# Patient Record
Sex: FEMALE | Race: WHITE | NOT HISPANIC OR LATINO | Employment: UNEMPLOYED | URBAN - NONMETROPOLITAN AREA
[De-identification: names, ages, dates, MRNs, and addresses within clinical notes are randomized per-mention and may not be internally consistent; named-entity substitution may affect disease eponyms.]

---

## 2018-05-17 ENCOUNTER — OFFICE VISIT (OUTPATIENT)
Dept: BARIATRICS/WEIGHT MGMT | Facility: CLINIC | Age: 63
End: 2018-05-17

## 2018-05-17 ENCOUNTER — OFFICE VISIT (OUTPATIENT)
Dept: BARIATRICS/WEIGHT MGMT | Facility: HOSPITAL | Age: 63
End: 2018-05-17

## 2018-05-17 ENCOUNTER — LAB (OUTPATIENT)
Dept: LAB | Facility: HOSPITAL | Age: 63
End: 2018-05-17
Attending: NURSE PRACTITIONER

## 2018-05-17 ENCOUNTER — TELEPHONE (OUTPATIENT)
Dept: BARIATRICS/WEIGHT MGMT | Facility: CLINIC | Age: 63
End: 2018-05-17

## 2018-05-17 VITALS
HEIGHT: 63 IN | DIASTOLIC BLOOD PRESSURE: 71 MMHG | SYSTOLIC BLOOD PRESSURE: 166 MMHG | OXYGEN SATURATION: 99 % | HEART RATE: 66 BPM | TEMPERATURE: 97.5 F | WEIGHT: 293 LBS | BODY MASS INDEX: 51.91 KG/M2

## 2018-05-17 DIAGNOSIS — E11.69 DIABETES MELLITUS TYPE 2 IN OBESE (HCC): ICD-10-CM

## 2018-05-17 DIAGNOSIS — I50.9 CONGESTIVE HEART FAILURE, UNSPECIFIED CONGESTIVE HEART FAILURE CHRONICITY, UNSPECIFIED CONGESTIVE HEART FAILURE TYPE: ICD-10-CM

## 2018-05-17 DIAGNOSIS — K21.9 GASTROESOPHAGEAL REFLUX DISEASE, ESOPHAGITIS PRESENCE NOT SPECIFIED: ICD-10-CM

## 2018-05-17 DIAGNOSIS — R53.83 FATIGUE, UNSPECIFIED TYPE: ICD-10-CM

## 2018-05-17 DIAGNOSIS — E78.5 HYPERLIPIDEMIA, UNSPECIFIED HYPERLIPIDEMIA TYPE: ICD-10-CM

## 2018-05-17 DIAGNOSIS — R29.818 SUSPECTED SLEEP APNEA: ICD-10-CM

## 2018-05-17 DIAGNOSIS — K76.0 FATTY LIVER: ICD-10-CM

## 2018-05-17 DIAGNOSIS — E66.9 DIABETES MELLITUS TYPE 2 IN OBESE (HCC): ICD-10-CM

## 2018-05-17 DIAGNOSIS — G47.19 EXCESSIVE DAYTIME SLEEPINESS: ICD-10-CM

## 2018-05-17 DIAGNOSIS — I10 ESSENTIAL HYPERTENSION: ICD-10-CM

## 2018-05-17 DIAGNOSIS — E66.01 MORBID OBESITY WITH BMI OF 50.0-59.9, ADULT (HCC): Primary | ICD-10-CM

## 2018-05-17 DIAGNOSIS — E66.01 MORBID OBESITY WITH BMI OF 50.0-59.9, ADULT (HCC): ICD-10-CM

## 2018-05-17 LAB
HBA1C MFR BLD: 8.2 %
TSH SERPL DL<=0.05 MIU/L-ACNC: 3.23 MIU/ML (ref 0.47–4.68)

## 2018-05-17 PROCEDURE — 84443 ASSAY THYROID STIM HORMONE: CPT | Performed by: NURSE PRACTITIONER

## 2018-05-17 PROCEDURE — 83036 HEMOGLOBIN GLYCOSYLATED A1C: CPT | Performed by: NURSE PRACTITIONER

## 2018-05-17 PROCEDURE — 97802 MEDICAL NUTRITION INDIV IN: CPT

## 2018-05-17 PROCEDURE — 36415 COLL VENOUS BLD VENIPUNCTURE: CPT

## 2018-05-17 PROCEDURE — 99204 OFFICE O/P NEW MOD 45 MIN: CPT | Performed by: NURSE PRACTITIONER

## 2018-05-17 RX ORDER — FUROSEMIDE 20 MG/1
20 TABLET ORAL DAILY
COMMUNITY

## 2018-05-17 RX ORDER — FLUOXETINE HYDROCHLORIDE 20 MG/1
40 CAPSULE ORAL DAILY
COMMUNITY
Start: 2018-03-01

## 2018-05-17 RX ORDER — MONTELUKAST SODIUM 4 MG/1
1 TABLET, CHEWABLE ORAL 2 TIMES DAILY
COMMUNITY
Start: 2018-05-12 | End: 2021-03-25

## 2018-05-17 RX ORDER — AMLODIPINE BESYLATE 10 MG/1
10 TABLET ORAL DAILY
COMMUNITY
Start: 2018-04-30

## 2018-05-17 RX ORDER — GENTAMICIN SULFATE 3 MG/ML
SOLUTION/ DROPS OPHTHALMIC
COMMUNITY
Start: 2018-02-26 | End: 2018-05-17

## 2018-05-17 RX ORDER — AZITHROMYCIN 250 MG/1
TABLET, FILM COATED ORAL
COMMUNITY
Start: 2018-02-26 | End: 2018-05-17

## 2018-05-17 RX ORDER — ATORVASTATIN CALCIUM 40 MG/1
40 TABLET, FILM COATED ORAL DAILY
COMMUNITY
Start: 2018-04-30

## 2018-05-17 RX ORDER — PANTOPRAZOLE SODIUM 40 MG/1
40 TABLET, DELAYED RELEASE ORAL DAILY
COMMUNITY
Start: 2018-04-30

## 2018-05-17 RX ORDER — RIVAROXABAN 20 MG/1
20 TABLET, FILM COATED ORAL
COMMUNITY
Start: 2018-05-07

## 2018-05-17 RX ORDER — METHYLPREDNISOLONE 4 MG/1
TABLET ORAL
COMMUNITY
Start: 2018-02-12 | End: 2018-05-17

## 2018-05-17 RX ORDER — CEFDINIR 300 MG/1
CAPSULE ORAL
COMMUNITY
Start: 2018-02-12 | End: 2018-07-25

## 2018-05-17 RX ORDER — CARVEDILOL 25 MG/1
25 TABLET ORAL 2 TIMES DAILY WITH MEALS
COMMUNITY
Start: 2018-05-15

## 2018-05-17 RX ORDER — GABAPENTIN 300 MG/1
900 CAPSULE ORAL 2 TIMES DAILY
COMMUNITY
Start: 2018-03-15

## 2018-05-17 NOTE — PROGRESS NOTES
"Subjective   Mariia Parker is a 63 y.o. female.     History of Present Illness   Mariia Parker is a 63 y.o. year-old who has morbid obesity and is interested in having medically supervised weight loss. Patient has been overweight for the past 40 years. The most weight ever lost was 50 pounds from walking and eating better. Unsupervised diet attempts include: calorie counting. Supervised diet attempts include:none.  Patient has tried the following OTC or prescription medications for weight loss: none. Patient states she tends to eat due to boredom. Patient is limited in activities due to: chronic pain \"all over\".   Vitals:    05/17/18 0903   BP: 166/71   BP Location: Right arm   Patient Position: Sitting   Cuff Size: Adult   Pulse: 66   Temp: 97.5 °F (36.4 °C)   SpO2: 99%   Weight: (!) 140 kg (308 lb 9.6 oz)   Height: 158.8 cm (62.5\")     She  has a past medical history of Anemia; Anxiety; Carpal tunnel syndrome; Congestive heart failure; Epistaxis; Fatty liver; Heart murmur; Heartburn; High cholesterol; Hypertension; Kidney disease; Migraines; Pain; Pulmonary embolus; Sciatica; Seasonal allergies (.); and Urinary incontinence.  She  does not have a problem list on file.  She  has a past surgical history that includes Cholecystectomy; Colonoscopy; Esophagogastroduodenoscopy; Hernia repair; Carpal tunnel release (Right); Hysterectomy; Umbilical hernia repair; and Foreign Body Removal (Right).  Her family history includes Breast cancer in her mother; Cancer in her mother; Heart disease in her mother; Hypertension in her brother and mother; No Known Problems in her father.  She  reports that she has never smoked. She has never used smokeless tobacco. She reports that she does not drink alcohol or use drugs.  Current Outpatient Prescriptions   Medication Sig Dispense Refill   • amLODIPine (NORVASC) 10 MG tablet      • atorvastatin (LIPITOR) 40 MG tablet      • carvedilol (COREG) 25 MG tablet      • colestipol " "(COLESTID) 1 g tablet      • FLUoxetine (PROzac) 20 MG capsule      • furosemide (LASIX) 20 MG tablet Take 20 mg by mouth 2 (Two) Times a Day.     • gabapentin (NEURONTIN) 300 MG capsule      • Multiple Vitamins-Minerals (MULTIVITAMIN ADULT PO) Take  by mouth.     • pantoprazole (PROTONIX) 40 MG EC tablet      • VITAMIN D, ERGOCALCIFEROL, PO Take  by mouth.     • Wheat Dextrin (BENEFIBER PO) Take  by mouth.     • XARELTO 20 MG tablet      • cefdinir (OMNICEF) 300 MG capsule        No current facility-administered medications for this visit.      She has No Known Allergies.      The following portions of the patient's history were reviewed and updated as appropriate: allergies, current medications, past family history, past medical history, past social history, past surgical history and problem list.    Review of Systems   Constitutional: Positive for fatigue and unexpected weight change. Negative for activity change and appetite change.   HENT: Positive for hearing loss and postnasal drip.    Eyes: Negative.         Wears glasses    Respiratory: Positive for shortness of breath. Negative for apnea, cough and chest tightness.         Snoring; scored \"18\" on [place]    Cardiovascular: Negative.  Negative for chest pain, palpitations and leg swelling.        CHF, HTN, heart murmur   Gastrointestinal: Negative.  Negative for abdominal pain, anal bleeding, constipation, nausea and vomiting.        IBS and umbilical hernia   Endocrine: Negative.         Hair loss   Genitourinary: Negative.         Kidney stones and kidney disease; sees nephrologist; stress incontinence   Musculoskeletal: Positive for arthralgias, back pain and myalgias.   Skin: Positive for rash.        Under skin folds   Allergic/Immunologic: Negative.    Neurological: Positive for weakness, numbness and headaches. Negative for seizures and syncope.   Hematological: Bruises/bleeds easily.        Hx of PE and on xarelto   Psychiatric/Behavioral: Positive " for sleep disturbance. Negative for dysphoric mood, self-injury and suicidal ideas. The patient is nervous/anxious.        Objective   Physical Exam   Constitutional: She is oriented to person, place, and time. Vital signs are normal. She appears well-developed and well-nourished. She is cooperative. No distress.   HENT:   Head: Normocephalic and atraumatic.   Nose: Nose normal.   Mouth/Throat: Oropharynx is clear and moist. No oropharyngeal exudate or tonsillar abscesses.   Eyes: Conjunctivae, EOM and lids are normal. Pupils are equal, round, and reactive to light. Right eye exhibits no discharge. Left eye exhibits no discharge.   Glasses noted   Neck: Trachea normal. Neck supple. No JVD present. Carotid bruit is not present. No neck rigidity. No tracheal deviation present. No thyromegaly present.   Cardiovascular: Normal rate, regular rhythm, S1 normal and S2 normal.    Murmur heard.   Systolic murmur is present with a grade of 2/6   Pulmonary/Chest: Effort normal and breath sounds normal. No stridor. No respiratory distress. She has no wheezes. She has no rales.   Abdominal: Soft. Bowel sounds are normal. She exhibits no distension. There is tenderness. A hernia is present.   Obese; large ventral hernia noted (about the size of a large cantaloupe)    Musculoskeletal: She exhibits edema.        Right shoulder: She exhibits normal strength.   2+ edema to lower legs   Lymphadenopathy:     She has no cervical adenopathy.   Neurological: She is alert and oriented to person, place, and time. She has normal strength. No cranial nerve deficit.   Skin: Skin is warm, dry and intact. No rash noted.   Psychiatric: She has a normal mood and affect. Her speech is normal and behavior is normal.   Alert and oriented x 3   Vitals reviewed.      Assessment/Plan   Mariia was seen today for obesity, nutrition counseling and weight loss.    Diagnoses and all orders for this visit:    Morbid obesity with BMI of 50.0-59.9, adult  -      Bariatric Nutritional Counseling; Standing  -     Polysomnography 4 or More Parameters  -     TSH; Future  -     Hemoglobin A1c; Future    Diabetes mellitus type 2 in obese  -     Bariatric Nutritional Counseling; Standing  -     Polysomnography 4 or More Parameters  -     TSH; Future  -     Hemoglobin A1c; Future    Essential hypertension  -     Bariatric Nutritional Counseling; Standing  -     Polysomnography 4 or More Parameters  -     TSH; Future  -     Hemoglobin A1c; Future    Hyperlipidemia, unspecified hyperlipidemia type  -     Bariatric Nutritional Counseling; Standing  -     TSH; Future  -     Hemoglobin A1c; Future    Fatty liver  -     Hemoglobin A1c; Future    Gastroesophageal reflux disease, esophagitis presence not specified    Congestive heart failure, unspecified congestive heart failure chronicity, unspecified congestive heart failure type    Suspected sleep apnea  -     Polysomnography 4 or More Parameters    Excessive daytime sleepiness  -     Polysomnography 4 or More Parameters    Fatigue, unspecified type  -     Polysomnography 4 or More Parameters  -     TSH; Future          Mariia Parker is a 63 y.o. who has a history of morbid obesity and desires medically supervised weight loss. Patient's personal weight loss goal is to lose at least 100 pounds total.   Baseline labs will be ordered today. Patient has been ordered a sleep study due to suspected sleep apnea. Office will arrange. Patient will see the dietician today for make specific goals for diet, exercise, and lifestyle. Patient has received intensive behavioral therapy for obesity today. I will have them follow up in one month for a weight recheck and to further discuss options.

## 2018-05-17 NOTE — PROGRESS NOTES
"Nutrition Bariatric/[facility] Note     Visit   Initial Assessment     Anthropometrics   Height: 62.5\"  Weight: 308.5#  BMI: 55.5    Waist: 57\"  Hip: 71\"  Chest: 56\"  Thigh: 28\"  Arm: 22\"  Body Fat:     Nutrition Recall  24 Hour recall:  (B) oatmeal or dry cereal or sausage mcgriddle or western omelet, water (L) skips meal or sandwich or salad or fast food, water (D) meal or sandwich or chili hotdog, water  Eating 2-3 meals daily   Protein lacking at some breakfast  Meeting protein daily goal  Snacking: chips, candy bar  Making healthier choices  Excessive sweet intake  Large portions  Drinking with meals   Drinking less than 64 fluid ounces    Exercise   None    Habits:  None    Education    Goal Setting and Information Packet    Nutrition Goals   Eat 3-4 meals per day with protein  Eat protein first at meals  Eliminate snacks  Healthier food choices  Portion control / Use smaller plate or measuring cup   Increase fluid intake to 64 ounces per day    Exercise Goals  Add 15-30 minutes of walking, cycling, elliptical, swimming, chair, yoga or crossfit daily    Rosa Maria Gentile RD, LD  05/17/2018  9:45 AM    "

## 2018-05-17 NOTE — PATIENT INSTRUCTIONS
Mariia Parker is a 63 y.o. who has a history of morbid obesity and desires medically supervised weight loss. Patient's personal weight loss goal is to lose at least 100 pounds total.   Baseline labs will be ordered today. Patient has been ordered a sleep study due to suspected sleep apnea. Office will arrange. Patient will see the dietician today for make specific goals for diet, exercise, and lifestyle. Patient has received intensive behavioral therapy for obesity today. I will have them follow up in one month for a weight recheck and to further discuss options.

## 2018-05-17 NOTE — TELEPHONE ENCOUNTER
TSH is normal.  Hemoglobin A1c 8.2%.  Target is less than 7%.  Patient is not currently on any diabetic medication (metformin stopped due to renal fx).  Patient advised to speak with her PCP in regards to medication and the possibility of considering Victoza.  We will have nurse fax over a copy of the lab work to Dr. Gaston Resendiz for his records.  Patient voiced understanding and is agreeable.

## 2018-06-08 ENCOUNTER — RESULTS ENCOUNTER (OUTPATIENT)
Dept: BARIATRICS/WEIGHT MGMT | Facility: CLINIC | Age: 63
End: 2018-06-08

## 2018-06-08 DIAGNOSIS — E78.5 HYPERLIPIDEMIA, UNSPECIFIED HYPERLIPIDEMIA TYPE: ICD-10-CM

## 2018-06-08 DIAGNOSIS — E11.69 DIABETES MELLITUS TYPE 2 IN OBESE (HCC): ICD-10-CM

## 2018-06-08 DIAGNOSIS — E66.01 MORBID OBESITY WITH BMI OF 50.0-59.9, ADULT (HCC): ICD-10-CM

## 2018-06-08 DIAGNOSIS — E66.9 DIABETES MELLITUS TYPE 2 IN OBESE (HCC): ICD-10-CM

## 2018-06-08 DIAGNOSIS — I10 ESSENTIAL HYPERTENSION: ICD-10-CM

## 2018-06-20 ENCOUNTER — OFFICE VISIT (OUTPATIENT)
Dept: BARIATRICS/WEIGHT MGMT | Facility: CLINIC | Age: 63
End: 2018-06-20

## 2018-06-20 VITALS
WEIGHT: 293 LBS | DIASTOLIC BLOOD PRESSURE: 59 MMHG | BODY MASS INDEX: 51.91 KG/M2 | TEMPERATURE: 98 F | SYSTOLIC BLOOD PRESSURE: 147 MMHG | HEIGHT: 63 IN | OXYGEN SATURATION: 97 % | HEART RATE: 70 BPM

## 2018-06-20 DIAGNOSIS — E66.9 DIABETES MELLITUS TYPE 2 IN OBESE (HCC): ICD-10-CM

## 2018-06-20 DIAGNOSIS — E11.69 DIABETES MELLITUS TYPE 2 IN OBESE (HCC): ICD-10-CM

## 2018-06-20 DIAGNOSIS — I10 ESSENTIAL HYPERTENSION: ICD-10-CM

## 2018-06-20 DIAGNOSIS — R29.818 SUSPECTED SLEEP APNEA: Primary | ICD-10-CM

## 2018-06-20 DIAGNOSIS — E66.01 OBESITY, MORBID, BMI 50 OR HIGHER (HCC): Primary | ICD-10-CM

## 2018-06-20 PROCEDURE — 99213 OFFICE O/P EST LOW 20 MIN: CPT | Performed by: NURSE PRACTITIONER

## 2018-06-20 NOTE — PATIENT INSTRUCTIONS
Mariia Parker has done well this month with healthy changes. Patient has lost 4 pounds. Today we discussed healthy changes in lifestyle, diet, and exercise.   Handout provided on portion sizes/control/reading nutrition labels.   Intensive behavioral therapy for obesity was done today.   Goals for this month are: 3 meals per day with protein at each; eat protein first, use smaller plate; exercise as advised (chair dancing, chair yoga, launch pad videos, swimming/water exercises). Check into silver sneaker program to help pay for gym membership.  Follow up in one month for a weight recheck.

## 2018-06-20 NOTE — PROGRESS NOTES
"Subjective   Mariia Parker is a 63 y.o. female.     History of Present Illness   Mariia Parker is here with morbid obesity and is being followed for medically supervised weight loss. This is the patient's 2nd visit to the office.  Patient is being followed by her PCP for diabetes and recently had to stop metformin and change to tradjenta due to decreased renal function. Patient has not been able to exercise this past month. Patient has been making health dietary choices and eating 3 meals per day with protein at each. She has been eating chicken and turkey. Patient is drinking 48 ounces of water per day. She has sleep study scheduled for 25th of this month.   Vitals:    06/20/18 0836   BP: 147/59   BP Location: Left arm   Patient Position: Sitting   Cuff Size: Adult   Pulse: 70   Temp: 98 °F (36.7 °C)   SpO2: 97%   Weight: (!) 138 kg (304 lb 3.2 oz)   Height: 158.8 cm (62.5\")         The following portions of the patient's history were reviewed and updated as appropriate: allergies, current medications, past family history, past medical history, past social history, past surgical history and problem list.    Review of Systems   Constitutional: Positive for fatigue and unexpected weight change. Negative for activity change and appetite change.   HENT: Positive for trouble swallowing.    Eyes: Positive for visual disturbance.   Respiratory: Positive for shortness of breath. Negative for apnea, cough and chest tightness.    Cardiovascular: Positive for leg swelling. Negative for chest pain and palpitations.   Gastrointestinal: Positive for constipation. Negative for abdominal pain, anal bleeding, nausea and vomiting.   Endocrine: Positive for polyuria.        Hair loss; DM   Genitourinary: Positive for frequency.   Musculoskeletal: Positive for arthralgias, back pain, myalgias and neck pain.   Skin: Negative.    Allergic/Immunologic: Negative.    Neurological: Positive for numbness and headaches. Negative for " seizures and syncope.   Hematological: Bruises/bleeds easily.        Anemia   Psychiatric/Behavioral: Positive for sleep disturbance. Negative for dysphoric mood and self-injury. The patient is nervous/anxious.        Objective   Physical Exam   Constitutional: She is oriented to person, place, and time. Vital signs are normal. She appears well-developed and well-nourished. She is cooperative. No distress.   HENT:   Head: Normocephalic and atraumatic.   Nose: Nose normal.   Mouth/Throat: Oropharynx is clear and moist. No oropharyngeal exudate or tonsillar abscesses.   Eyes: Conjunctivae, EOM and lids are normal. Pupils are equal, round, and reactive to light. Right eye exhibits no discharge. Left eye exhibits no discharge.   Neck: Trachea normal. Neck supple. No JVD present. Carotid bruit is not present. No neck rigidity. No tracheal deviation present. No thyromegaly present.   Cardiovascular: Normal rate, regular rhythm, S1 normal and S2 normal.    Murmur heard.   Systolic murmur is present with a grade of 2/6   Pulmonary/Chest: Effort normal and breath sounds normal. No stridor. No respiratory distress. She has no wheezes. She has no rales.   Abdominal: Soft. Bowel sounds are normal. She exhibits no distension. There is no tenderness.   Obese; large ventral hernia noted (about the size of a large cantaloupe)     Musculoskeletal: She exhibits edema.        Right shoulder: She exhibits normal strength.   Trace edema to lower legs   Lymphadenopathy:     She has no cervical adenopathy.   Neurological: She is alert and oriented to person, place, and time. She has normal strength. No cranial nerve deficit.   Skin: Skin is warm, dry and intact. No rash noted.   Psychiatric: She has a normal mood and affect. Her speech is normal and behavior is normal.   Alert and oriented x 3   Vitals reviewed.      Assessment/Plan   Mariia was seen today for follow-up, obesity, nutrition counseling and weight loss.    Diagnoses and all  orders for this visit:    Obesity, morbid, BMI 50 or higher    Diabetes mellitus type 2 in obese  Comments:  keep FU appts with PCP and keep taking tradjenta per him.   Weight loss and exercise will help this as well.     Essential hypertension  Comments:  blood pressure much improved today; weight loss will continue to help this as well        criselda Parker has done well this month with healthy changes. Patient has lost 4 pounds. Today we discussed healthy changes in lifestyle, diet, and exercise.   Handout provided on portion sizes/control/reading nutrition labels.   Intensive behavioral therapy for obesity was done today.   Goals for this month are: 3 meals per day with protein at each; eat protein first, use smaller plate; exercise as advised (chair dancing, chair yoga, launch pad videos, swimming/water exercises). Check into silver sneaker program to help pay for gym membership.  Follow up in one month for a weight recheck.

## 2018-06-25 ENCOUNTER — HOSPITAL ENCOUNTER (OUTPATIENT)
Dept: SLEEP MEDICINE | Facility: HOSPITAL | Age: 63
End: 2018-06-25
Attending: NURSE PRACTITIONER

## 2018-07-05 ENCOUNTER — HOSPITAL ENCOUNTER (OUTPATIENT)
Dept: SLEEP MEDICINE | Facility: HOSPITAL | Age: 63
Discharge: HOME | End: 2018-07-05
Admitting: NURSE PRACTITIONER

## 2018-07-05 DIAGNOSIS — R29.818 SUSPECTED SLEEP APNEA: ICD-10-CM

## 2018-07-05 PROCEDURE — 95806 SLEEP STUDY UNATT&RESP EFFT: CPT

## 2018-07-05 PROCEDURE — 95806 SLEEP STUDY UNATT&RESP EFFT: CPT | Performed by: PSYCHIATRY & NEUROLOGY

## 2018-07-06 ENCOUNTER — RESULTS ENCOUNTER (OUTPATIENT)
Dept: BARIATRICS/WEIGHT MGMT | Facility: CLINIC | Age: 63
End: 2018-07-06

## 2018-07-06 DIAGNOSIS — E66.9 DIABETES MELLITUS TYPE 2 IN OBESE (HCC): ICD-10-CM

## 2018-07-06 DIAGNOSIS — E11.69 DIABETES MELLITUS TYPE 2 IN OBESE (HCC): ICD-10-CM

## 2018-07-06 DIAGNOSIS — I10 ESSENTIAL HYPERTENSION: ICD-10-CM

## 2018-07-06 DIAGNOSIS — E66.01 MORBID OBESITY WITH BMI OF 50.0-59.9, ADULT (HCC): ICD-10-CM

## 2018-07-06 DIAGNOSIS — E78.5 HYPERLIPIDEMIA, UNSPECIFIED HYPERLIPIDEMIA TYPE: ICD-10-CM

## 2018-07-25 ENCOUNTER — OFFICE VISIT (OUTPATIENT)
Dept: BARIATRICS/WEIGHT MGMT | Facility: CLINIC | Age: 63
End: 2018-07-25

## 2018-07-25 VITALS
OXYGEN SATURATION: 100 % | HEIGHT: 63 IN | WEIGHT: 293 LBS | HEART RATE: 73 BPM | BODY MASS INDEX: 51.91 KG/M2 | DIASTOLIC BLOOD PRESSURE: 63 MMHG | TEMPERATURE: 98.7 F | SYSTOLIC BLOOD PRESSURE: 146 MMHG

## 2018-07-25 DIAGNOSIS — E66.9 DIABETES MELLITUS TYPE 2 IN OBESE (HCC): ICD-10-CM

## 2018-07-25 DIAGNOSIS — K43.2 INCISIONAL HERNIA WITHOUT OBSTRUCTION OR GANGRENE: ICD-10-CM

## 2018-07-25 DIAGNOSIS — E11.69 DIABETES MELLITUS TYPE 2 IN OBESE (HCC): ICD-10-CM

## 2018-07-25 DIAGNOSIS — I10 ESSENTIAL HYPERTENSION: ICD-10-CM

## 2018-07-25 DIAGNOSIS — E66.01 MORBID OBESITY WITH BODY MASS INDEX (BMI) GREATER THAN OR EQUAL TO 50 (HCC): Primary | ICD-10-CM

## 2018-07-25 PROCEDURE — 99213 OFFICE O/P EST LOW 20 MIN: CPT | Performed by: NURSE PRACTITIONER

## 2018-07-25 NOTE — PATIENT INSTRUCTIONS
Mariia BROOKE Keith has done well this month with healthy changes. Patient has lost 10 pounds. Today we discussed healthy changes in lifestyle, diet, and exercise.   Handout provided on portion sizes/control/reading nutrition lables.   Intensive behavioral therapy for obesity was done today.   Goals for this month are: 3 meals per day with protein at each; eat protein first, use smaller plate; exercise as advised.  Follow up in one month for a weight recheck and meet with Dr. Wilcox ([facility] patient; has large abdominal hernia).

## 2018-07-25 NOTE — PROGRESS NOTES
"Subjective   Mariia Parker is a 63 y.o. female.     History of Present Illness   Mariia Parker is here with morbid obesity and is being followed for medically supervised weight loss. This is the patient's 3rd visit to the office.  Patient has been taking tradjenta per her PCP and is doing well on that.  Patient has been exercising by doing chair yoga 2-3 times per week. Patient has been making health dietary choices and eating 3 meals per day with protein at each. Patient is drinking 32 ounces of water per day.   Vitals:    07/25/18 1033   BP: 146/63   BP Location: Left arm   Patient Position: Sitting   Cuff Size: Adult   Pulse: 73   Temp: 98.7 °F (37.1 °C)   SpO2: 100%   Weight: 134 kg (294 lb 6.4 oz)   Height: 158.8 cm (62.5\")         The following portions of the patient's history were reviewed and updated as appropriate: allergies, current medications, past family history, past medical history, past social history, past surgical history and problem list.    Review of Systems   Constitutional: Positive for fatigue. Negative for activity change and appetite change.   HENT: Positive for nosebleeds.    Eyes: Positive for visual disturbance.   Respiratory: Positive for shortness of breath. Negative for apnea, cough and chest tightness.    Cardiovascular: Positive for leg swelling. Negative for chest pain and palpitations.   Gastrointestinal: Positive for constipation. Negative for abdominal pain, anal bleeding, nausea and vomiting.   Endocrine: Negative.         Hair loss, night sweats, DM   Genitourinary: Positive for frequency.   Musculoskeletal: Positive for arthralgias, back pain and neck pain.   Skin: Positive for rash.        In skin folds   Allergic/Immunologic: Negative.    Neurological: Positive for numbness and headaches. Negative for seizures and syncope.   Hematological: Bruises/bleeds easily.   Psychiatric/Behavioral: Positive for sleep disturbance. Negative for dysphoric mood and self-injury. " The patient is nervous/anxious.        Objective   Physical Exam   Constitutional: She is oriented to person, place, and time. Vital signs are normal. She appears well-developed and well-nourished. She is cooperative. No distress.   HENT:   Head: Normocephalic and atraumatic.   Nose: Nose normal.   Mouth/Throat: Oropharynx is clear and moist. No oropharyngeal exudate or tonsillar abscesses.   Eyes: Pupils are equal, round, and reactive to light. Conjunctivae, EOM and lids are normal. Right eye exhibits no discharge. Left eye exhibits no discharge.   Glasses noted   Neck: Trachea normal. Neck supple. No JVD present. Carotid bruit is not present. No neck rigidity. No tracheal deviation present. No thyromegaly present.   Cardiovascular: Normal rate, regular rhythm, S1 normal and S2 normal.    Murmur heard.   Systolic murmur is present with a grade of 2/6   Pulmonary/Chest: Effort normal and breath sounds normal. No stridor. No respiratory distress. She has no wheezes. She has no rales.   Abdominal: Soft. Bowel sounds are normal. She exhibits no distension. There is no tenderness. A hernia is present.   Obese; large ventral hernia noted (about the size of a large cantaloupe)   Musculoskeletal: She exhibits no edema.        Right shoulder: She exhibits normal strength.   Using cane for ambulation   Lymphadenopathy:     She has no cervical adenopathy.   Neurological: She is alert and oriented to person, place, and time. She has normal strength. No cranial nerve deficit.   Skin: Skin is warm, dry and intact. No rash noted.   Psychiatric: She has a normal mood and affect. Her speech is normal and behavior is normal.   Alert and oriented x 3   Vitals reviewed.      Assessment/Plan   Mariia was seen today for follow-up, obesity, nutrition counseling and weight loss.    Diagnoses and all orders for this visit:    Morbid obesity with body mass index (BMI) greater than or equal to 50 (CMS/[facility])  Comments:  medically supervised  weight loss    Diabetes mellitus type 2 in obese (CMS/[facility])  Comments:  continue to take current meds; FU with PCP    Essential hypertension  Comments:  controlled; continue current meds; FU with PCP    Incisional hernia without obstruction or gangrene  Comments:  meet with Dr. Wilcox at next visit; may need referral to general surgeon for repair          Mariia Parker has done well this month with healthy changes. Patient has lost 10 pounds. Today we discussed healthy changes in lifestyle, diet, and exercise.   Handout provided on portion sizes/control/reading nutrition lables.   Intensive behavioral therapy for obesity was done today.   Goals for this month are: 3 meals per day with protein at each; eat protein first, use smaller plate; exercise as advised.  Follow up in one month for a weight recheck and meet with Dr. Wilcox ([facility] patient; has large abdominal hernia).

## 2018-08-03 ENCOUNTER — RESULTS ENCOUNTER (OUTPATIENT)
Dept: BARIATRICS/WEIGHT MGMT | Facility: CLINIC | Age: 63
End: 2018-08-03

## 2018-08-03 DIAGNOSIS — E66.01 MORBID OBESITY WITH BMI OF 50.0-59.9, ADULT (HCC): ICD-10-CM

## 2018-08-03 DIAGNOSIS — E78.5 HYPERLIPIDEMIA, UNSPECIFIED HYPERLIPIDEMIA TYPE: ICD-10-CM

## 2018-08-03 DIAGNOSIS — E11.69 DIABETES MELLITUS TYPE 2 IN OBESE (HCC): ICD-10-CM

## 2018-08-03 DIAGNOSIS — I10 ESSENTIAL HYPERTENSION: ICD-10-CM

## 2018-08-03 DIAGNOSIS — E66.9 DIABETES MELLITUS TYPE 2 IN OBESE (HCC): ICD-10-CM

## 2018-08-31 ENCOUNTER — RESULTS ENCOUNTER (OUTPATIENT)
Dept: BARIATRICS/WEIGHT MGMT | Facility: CLINIC | Age: 63
End: 2018-08-31

## 2018-08-31 DIAGNOSIS — E66.9 DIABETES MELLITUS TYPE 2 IN OBESE (HCC): ICD-10-CM

## 2018-08-31 DIAGNOSIS — E78.5 HYPERLIPIDEMIA, UNSPECIFIED HYPERLIPIDEMIA TYPE: ICD-10-CM

## 2018-08-31 DIAGNOSIS — E11.69 DIABETES MELLITUS TYPE 2 IN OBESE (HCC): ICD-10-CM

## 2018-08-31 DIAGNOSIS — I10 ESSENTIAL HYPERTENSION: ICD-10-CM

## 2018-08-31 DIAGNOSIS — E66.01 MORBID OBESITY WITH BMI OF 50.0-59.9, ADULT (HCC): ICD-10-CM

## 2018-09-04 ENCOUNTER — OFFICE VISIT (OUTPATIENT)
Dept: BARIATRICS/WEIGHT MGMT | Facility: CLINIC | Age: 63
End: 2018-09-04

## 2018-09-04 VITALS
DIASTOLIC BLOOD PRESSURE: 63 MMHG | TEMPERATURE: 98.7 F | BODY MASS INDEX: 51.31 KG/M2 | OXYGEN SATURATION: 95 % | HEIGHT: 63 IN | SYSTOLIC BLOOD PRESSURE: 189 MMHG | HEART RATE: 77 BPM | WEIGHT: 289.6 LBS

## 2018-09-04 PROCEDURE — 99212 OFFICE O/P EST SF 10 MIN: CPT | Performed by: SURGERY

## 2018-09-04 RX ORDER — SENNOSIDES 8.6 MG
650 CAPSULE ORAL EVERY 8 HOURS PRN
COMMUNITY

## 2018-09-04 NOTE — PROGRESS NOTES
"Subjective   Mariia Parker is a 63 y.o. female.     Mariia is  for her follow-up visit for weight loss and treatment of her obesity.  She is currently on her regular diet.  She states she is not measuring her meals and estimates that her meal sizes are between a cup or 2 cups.  She states that she eats 3 meals a day and exercises on her chair.    Review Of Systems:  General ROS: positive for  - fatigue, night sweats and sleep disturbance  Gastrointestinal ROS: no abdominal pain, change in bowel habits, or black or bloody stools  positive for - constipation    The following portions of the patient's history were reviewed and updated as appropriate: allergies, current medications, past medical history, past surgical history and problem list.    Objective   BP (!) 189/63 (BP Location: Left arm, Patient Position: Sitting, Cuff Size: Adult)   Pulse 77   Temp 98.7 °F (37.1 °C)   Ht 158.8 cm (62.5\")   Wt 131 kg (289 lb 9.6 oz)   SpO2 95%   BMI 52.12 kg/m²     General Appearance:  awake, alert, oriented, in no acute distress    Assessment/Plan     Encounter Diagnoses   Name Primary?   • Body mass index (BMI) of 50-59.9 in adult (CMS/[facility]) Yes       Mariia Parker and I discussed the importance of changing behavior for consistent and successful weight loss.  We first reviewed again the definition of a meal which is defined as portion sizes less than2 cups and those portions incorporating a protein.  Specifically the protein should fill half of that volume.  I also explained that she should attempt to consume 4 meals as defined above daily and to avoid snacking or grazing.  She should also be mindful of adequate hydration by consuming at least 64 oz of water daily and incorporation of a regular exercise regimen.   I discussed the importance of taking her multivitamins as directed.      09/04/18  11:50 AM  Patient Care Team:  Gsaton Resendiz MD as PCP - General (General Practice)  Mehul Wilcox MD FACS    "

## 2018-09-28 ENCOUNTER — RESULTS ENCOUNTER (OUTPATIENT)
Dept: BARIATRICS/WEIGHT MGMT | Facility: CLINIC | Age: 63
End: 2018-09-28

## 2018-09-28 DIAGNOSIS — I10 ESSENTIAL HYPERTENSION: ICD-10-CM

## 2018-09-28 DIAGNOSIS — E66.9 DIABETES MELLITUS TYPE 2 IN OBESE (HCC): ICD-10-CM

## 2018-09-28 DIAGNOSIS — E11.69 DIABETES MELLITUS TYPE 2 IN OBESE (HCC): ICD-10-CM

## 2018-09-28 DIAGNOSIS — E66.01 MORBID OBESITY WITH BMI OF 50.0-59.9, ADULT (HCC): ICD-10-CM

## 2018-09-28 DIAGNOSIS — E78.5 HYPERLIPIDEMIA, UNSPECIFIED HYPERLIPIDEMIA TYPE: ICD-10-CM

## 2018-10-03 ENCOUNTER — OFFICE VISIT (OUTPATIENT)
Dept: BARIATRICS/WEIGHT MGMT | Facility: CLINIC | Age: 63
End: 2018-10-03

## 2018-10-03 VITALS
TEMPERATURE: 98.6 F | HEIGHT: 63 IN | BODY MASS INDEX: 51.56 KG/M2 | OXYGEN SATURATION: 98 % | DIASTOLIC BLOOD PRESSURE: 57 MMHG | HEART RATE: 65 BPM | SYSTOLIC BLOOD PRESSURE: 166 MMHG | WEIGHT: 291 LBS

## 2018-10-03 DIAGNOSIS — E66.01 OBESITY, MORBID, BMI 50 OR HIGHER (HCC): Primary | ICD-10-CM

## 2018-10-03 DIAGNOSIS — R04.0 EPISTAXIS: ICD-10-CM

## 2018-10-03 PROCEDURE — 99212 OFFICE O/P EST SF 10 MIN: CPT | Performed by: NURSE PRACTITIONER

## 2018-10-03 NOTE — PROGRESS NOTES
"Subjective   Mariia Parker is a 63 y.o. female.     History of Present Illness   Mariia Parker is here with morbid obesity and is being followed for medically supervised weight loss. This is the patient's 5th visit to the office.  Patient has been seen by Dr. Wilcox and she will continue with medical weight loss. Patient has been exercising by doing chair yoga for 15 minutes when she can. Patient has been making health dietary choices and eating 3 meals per day with protein at each.  Patient is drinking 50 ounces of water per day. She is not drinking any soda.   Vitals:    10/03/18 1019   BP: 166/57   BP Location: Right arm   Patient Position: Sitting   Cuff Size: Adult   Pulse: 65   Temp: 98.6 °F (37 °C)   SpO2: 98%   Weight: 132 kg (291 lb)   Height: 158.8 cm (62.5\")         The following portions of the patient's history were reviewed and updated as appropriate: allergies, current medications, past family history, past medical history, past social history, past surgical history and problem list.    Review of Systems   Constitutional: Positive for fatigue. Negative for activity change and appetite change.   HENT: Positive for hearing loss, nosebleeds and trouble swallowing.    Eyes: Positive for visual disturbance.   Respiratory: Positive for shortness of breath. Negative for apnea, cough and chest tightness.    Cardiovascular: Positive for chest pain and leg swelling. Negative for palpitations.   Gastrointestinal: Positive for constipation. Negative for abdominal pain, anal bleeding, blood in stool, nausea and vomiting.   Endocrine: Negative.         Hair loss   Genitourinary: Positive for frequency.   Musculoskeletal: Positive for back pain, gait problem, myalgias and neck pain. Negative for arthralgias.   Skin: Negative.    Allergic/Immunologic: Negative.    Neurological: Positive for numbness and headaches. Negative for seizures and syncope.   Hematological: Bruises/bleeds easily.        Hx of blood " clots; on Xarelto   Psychiatric/Behavioral: Positive for sleep disturbance. Negative for dysphoric mood and self-injury. The patient is nervous/anxious.        Objective   Physical Exam   Constitutional: She is oriented to person, place, and time. Vital signs are normal. She appears well-developed and well-nourished. She is cooperative. No distress.   HENT:   Head: Normocephalic and atraumatic.   Nose: Nose normal.   Mouth/Throat: Oropharynx is clear and moist. No oropharyngeal exudate or tonsillar abscesses.   Eyes: Pupils are equal, round, and reactive to light. Conjunctivae, EOM and lids are normal. Right eye exhibits no discharge. Left eye exhibits no discharge.   Glasses noted   Neck: Trachea normal. Neck supple. No JVD present. Carotid bruit is not present. No neck rigidity. No tracheal deviation present. No thyromegaly present.   Cardiovascular: Normal rate, regular rhythm, S1 normal and S2 normal.    Murmur heard.   Systolic murmur is present with a grade of 2/6   Pulmonary/Chest: Effort normal and breath sounds normal. No stridor. No respiratory distress. She has no wheezes. She has no rales.   Abdominal: Soft. Bowel sounds are normal. She exhibits no distension. There is no tenderness. A hernia is present.   Obese; large hernia noted (size of cantaloupe)   Musculoskeletal: She exhibits no edema.        Right shoulder: She exhibits normal strength.   Using cane for ambulation   Lymphadenopathy:     She has no cervical adenopathy.   Neurological: She is alert and oriented to person, place, and time. She has normal strength. No cranial nerve deficit.   Skin: Skin is warm, dry and intact. No rash noted.   Psychiatric: She has a normal mood and affect. Her speech is normal and behavior is normal.   Alert and oriented x 3   Vitals reviewed.      Assessment/Plan   Mariia was seen today for weight loss, obesity and nutrition counseling.    Diagnoses and all orders for this visit:    Obesity, morbid, BMI 50 or  higher (CMS/HCC)  Comments:  MWL    Epistaxis  Comments:  Follow up with PCP about this as you are on [facility] Keith has done okay this month with healthy changes. Patient has gained 2 pounds, despite her efforts.   Today we discussed healthy changes in lifestyle, diet, and exercise.   Handout provided on weight proofing your home.   Intensive behavioral therapy for obesity was done today.   Goals for this month are: 3 meals per day with protein at each; eat protein first, use smaller plate; exercise as advised.  Follow up in one month for a weight recheck.

## 2018-10-03 NOTE — PATIENT INSTRUCTIONS
Mariia Parker has done okay this month with healthy changes. Patient has gained 2 pounds, despite her efforts.   Today we discussed healthy changes in lifestyle, diet, and exercise.   Handout provided on weight proofing your home.   Intensive behavioral therapy for obesity was done today.   Goals for this month are: 3 meals per day with protein at each; eat protein first, use smaller plate; exercise as advised.  Follow up in one month for a weight recheck.

## 2018-10-26 ENCOUNTER — RESULTS ENCOUNTER (OUTPATIENT)
Dept: BARIATRICS/WEIGHT MGMT | Facility: CLINIC | Age: 63
End: 2018-10-26

## 2018-10-26 DIAGNOSIS — I10 ESSENTIAL HYPERTENSION: ICD-10-CM

## 2018-10-26 DIAGNOSIS — E78.5 HYPERLIPIDEMIA, UNSPECIFIED HYPERLIPIDEMIA TYPE: ICD-10-CM

## 2018-10-26 DIAGNOSIS — E66.9 DIABETES MELLITUS TYPE 2 IN OBESE (HCC): ICD-10-CM

## 2018-10-26 DIAGNOSIS — E11.69 DIABETES MELLITUS TYPE 2 IN OBESE (HCC): ICD-10-CM

## 2018-10-26 DIAGNOSIS — E66.01 MORBID OBESITY WITH BMI OF 50.0-59.9, ADULT (HCC): ICD-10-CM

## 2018-11-15 ENCOUNTER — APPOINTMENT (OUTPATIENT)
Dept: BARIATRICS/WEIGHT MGMT | Facility: HOSPITAL | Age: 63
End: 2018-11-15

## 2018-11-23 ENCOUNTER — RESULTS ENCOUNTER (OUTPATIENT)
Dept: BARIATRICS/WEIGHT MGMT | Facility: CLINIC | Age: 63
End: 2018-11-23

## 2018-11-23 DIAGNOSIS — E66.9 DIABETES MELLITUS TYPE 2 IN OBESE (HCC): ICD-10-CM

## 2018-11-23 DIAGNOSIS — I10 ESSENTIAL HYPERTENSION: ICD-10-CM

## 2018-11-23 DIAGNOSIS — E66.01 MORBID OBESITY WITH BMI OF 50.0-59.9, ADULT (HCC): ICD-10-CM

## 2018-11-23 DIAGNOSIS — E78.5 HYPERLIPIDEMIA, UNSPECIFIED HYPERLIPIDEMIA TYPE: ICD-10-CM

## 2018-11-23 DIAGNOSIS — E11.69 DIABETES MELLITUS TYPE 2 IN OBESE (HCC): ICD-10-CM

## 2018-12-21 ENCOUNTER — RESULTS ENCOUNTER (OUTPATIENT)
Dept: BARIATRICS/WEIGHT MGMT | Facility: CLINIC | Age: 63
End: 2018-12-21

## 2018-12-21 DIAGNOSIS — E66.9 DIABETES MELLITUS TYPE 2 IN OBESE (HCC): ICD-10-CM

## 2018-12-21 DIAGNOSIS — E66.01 MORBID OBESITY WITH BMI OF 50.0-59.9, ADULT (HCC): ICD-10-CM

## 2018-12-21 DIAGNOSIS — I10 ESSENTIAL HYPERTENSION: ICD-10-CM

## 2018-12-21 DIAGNOSIS — E11.69 DIABETES MELLITUS TYPE 2 IN OBESE (HCC): ICD-10-CM

## 2018-12-21 DIAGNOSIS — E78.5 HYPERLIPIDEMIA, UNSPECIFIED HYPERLIPIDEMIA TYPE: ICD-10-CM

## 2019-01-18 ENCOUNTER — RESULTS ENCOUNTER (OUTPATIENT)
Dept: BARIATRICS/WEIGHT MGMT | Facility: CLINIC | Age: 64
End: 2019-01-18

## 2019-01-18 DIAGNOSIS — E11.69 DIABETES MELLITUS TYPE 2 IN OBESE (HCC): ICD-10-CM

## 2019-01-18 DIAGNOSIS — E66.9 DIABETES MELLITUS TYPE 2 IN OBESE (HCC): ICD-10-CM

## 2019-01-18 DIAGNOSIS — I10 ESSENTIAL HYPERTENSION: ICD-10-CM

## 2019-01-18 DIAGNOSIS — E66.01 MORBID OBESITY WITH BMI OF 50.0-59.9, ADULT (HCC): ICD-10-CM

## 2019-01-18 DIAGNOSIS — E78.5 HYPERLIPIDEMIA, UNSPECIFIED HYPERLIPIDEMIA TYPE: ICD-10-CM

## 2019-02-15 ENCOUNTER — RESULTS ENCOUNTER (OUTPATIENT)
Dept: BARIATRICS/WEIGHT MGMT | Facility: CLINIC | Age: 64
End: 2019-02-15

## 2019-02-15 DIAGNOSIS — E66.01 MORBID OBESITY WITH BMI OF 50.0-59.9, ADULT (HCC): ICD-10-CM

## 2019-02-15 DIAGNOSIS — I10 ESSENTIAL HYPERTENSION: ICD-10-CM

## 2019-02-15 DIAGNOSIS — E78.5 HYPERLIPIDEMIA, UNSPECIFIED HYPERLIPIDEMIA TYPE: ICD-10-CM

## 2019-02-15 DIAGNOSIS — E11.69 DIABETES MELLITUS TYPE 2 IN OBESE (HCC): ICD-10-CM

## 2019-02-15 DIAGNOSIS — E66.9 DIABETES MELLITUS TYPE 2 IN OBESE (HCC): ICD-10-CM

## 2019-02-18 ENCOUNTER — LAB REQUISITION (OUTPATIENT)
Dept: LAB | Facility: HOSPITAL | Age: 64
End: 2019-02-18

## 2019-02-18 DIAGNOSIS — Z00.00 ENCOUNTER FOR GENERAL ADULT MEDICAL EXAMINATION WITHOUT ABNORMAL FINDINGS: ICD-10-CM

## 2019-02-18 LAB
FERRITIN SERPL-MCNC: 86.9 NG/ML (ref 11.1–264)
IRON 24H UR-MRATE: 85 MCG/DL (ref 42–180)
IRON SATN MFR SERPL: 22 % (ref 20–45)
TIBC SERPL-MCNC: 389 MCG/DL (ref 225–420)

## 2019-02-18 PROCEDURE — 82728 ASSAY OF FERRITIN: CPT | Performed by: INTERNAL MEDICINE

## 2019-02-18 PROCEDURE — 83550 IRON BINDING TEST: CPT | Performed by: INTERNAL MEDICINE

## 2019-02-18 PROCEDURE — 83540 ASSAY OF IRON: CPT | Performed by: INTERNAL MEDICINE

## 2019-03-15 ENCOUNTER — RESULTS ENCOUNTER (OUTPATIENT)
Dept: BARIATRICS/WEIGHT MGMT | Facility: CLINIC | Age: 64
End: 2019-03-15

## 2019-03-15 DIAGNOSIS — E66.9 DIABETES MELLITUS TYPE 2 IN OBESE (HCC): ICD-10-CM

## 2019-03-15 DIAGNOSIS — I10 ESSENTIAL HYPERTENSION: ICD-10-CM

## 2019-03-15 DIAGNOSIS — E11.69 DIABETES MELLITUS TYPE 2 IN OBESE (HCC): ICD-10-CM

## 2019-03-15 DIAGNOSIS — E78.5 HYPERLIPIDEMIA, UNSPECIFIED HYPERLIPIDEMIA TYPE: ICD-10-CM

## 2019-03-15 DIAGNOSIS — E66.01 MORBID OBESITY WITH BMI OF 50.0-59.9, ADULT (HCC): ICD-10-CM

## 2019-04-08 ENCOUNTER — LAB REQUISITION (OUTPATIENT)
Dept: LAB | Facility: HOSPITAL | Age: 64
End: 2019-04-08

## 2019-04-08 DIAGNOSIS — Z00.00 ENCOUNTER FOR GENERAL ADULT MEDICAL EXAMINATION WITHOUT ABNORMAL FINDINGS: ICD-10-CM

## 2019-04-08 LAB
ALBUMIN SERPL-MCNC: 3.8 G/DL (ref 3.5–5)
ALBUMIN/GLOB SERPL: 1.3 G/DL (ref 1.1–2.5)
ALP SERPL-CCNC: 116 U/L (ref 24–120)
ALT SERPL W P-5'-P-CCNC: 21 U/L (ref 0–54)
ANION GAP SERPL CALCULATED.3IONS-SCNC: 9 MMOL/L (ref 4–13)
AST SERPL-CCNC: 22 U/L (ref 7–45)
BILIRUB SERPL-MCNC: 0.7 MG/DL (ref 0.1–1)
BUN BLD-MCNC: 26 MG/DL (ref 5–21)
BUN/CREAT SERPL: 22 (ref 7–25)
CALCIUM SPEC-SCNC: 9.4 MG/DL (ref 8.4–10.4)
CHLORIDE SERPL-SCNC: 101 MMOL/L (ref 98–110)
CO2 SERPL-SCNC: 31 MMOL/L (ref 24–31)
CREAT BLD-MCNC: 1.18 MG/DL (ref 0.5–1.4)
GFR SERPL CREATININE-BSD FRML MDRD: 46 ML/MIN/1.73
GLOBULIN UR ELPH-MCNC: 3 GM/DL
GLUCOSE BLD-MCNC: 175 MG/DL (ref 70–100)
IRON 24H UR-MRATE: 70 MCG/DL (ref 42–180)
IRON SATN MFR SERPL: 20 % (ref 20–45)
POTASSIUM BLD-SCNC: 4.3 MMOL/L (ref 3.5–5.3)
PROT SERPL-MCNC: 6.8 G/DL (ref 6.3–8.7)
SODIUM BLD-SCNC: 141 MMOL/L (ref 135–145)
TIBC SERPL-MCNC: 351 MCG/DL (ref 225–420)

## 2019-04-08 PROCEDURE — 82728 ASSAY OF FERRITIN: CPT | Performed by: INTERNAL MEDICINE

## 2019-04-08 PROCEDURE — 83540 ASSAY OF IRON: CPT | Performed by: INTERNAL MEDICINE

## 2019-04-08 PROCEDURE — 80053 COMPREHEN METABOLIC PANEL: CPT | Performed by: INTERNAL MEDICINE

## 2019-04-08 PROCEDURE — 83550 IRON BINDING TEST: CPT | Performed by: INTERNAL MEDICINE

## 2019-04-09 LAB — FERRITIN SERPL-MCNC: 72.1 NG/ML (ref 11.1–264)

## 2019-04-12 ENCOUNTER — RESULTS ENCOUNTER (OUTPATIENT)
Dept: BARIATRICS/WEIGHT MGMT | Facility: CLINIC | Age: 64
End: 2019-04-12

## 2019-04-12 DIAGNOSIS — E11.69 DIABETES MELLITUS TYPE 2 IN OBESE (HCC): ICD-10-CM

## 2019-04-12 DIAGNOSIS — E66.9 DIABETES MELLITUS TYPE 2 IN OBESE (HCC): ICD-10-CM

## 2019-04-12 DIAGNOSIS — I10 ESSENTIAL HYPERTENSION: ICD-10-CM

## 2019-04-12 DIAGNOSIS — E66.01 MORBID OBESITY WITH BMI OF 50.0-59.9, ADULT (HCC): ICD-10-CM

## 2019-04-12 DIAGNOSIS — E78.5 HYPERLIPIDEMIA, UNSPECIFIED HYPERLIPIDEMIA TYPE: ICD-10-CM

## 2019-04-14 ENCOUNTER — APPOINTMENT (OUTPATIENT)
Dept: GENERAL RADIOLOGY | Facility: HOSPITAL | Age: 64
End: 2019-04-14

## 2019-04-14 ENCOUNTER — HOSPITAL ENCOUNTER (EMERGENCY)
Facility: HOSPITAL | Age: 64
Discharge: HOME | End: 2019-04-14
Admitting: EMERGENCY MEDICINE

## 2019-04-14 VITALS
WEIGHT: 293 LBS | TEMPERATURE: 97.7 F | DIASTOLIC BLOOD PRESSURE: 75 MMHG | OXYGEN SATURATION: 97 % | HEIGHT: 63 IN | SYSTOLIC BLOOD PRESSURE: 162 MMHG | BODY MASS INDEX: 51.91 KG/M2 | HEART RATE: 66 BPM | RESPIRATION RATE: 18 BRPM

## 2019-04-14 DIAGNOSIS — G89.29 CHRONIC BILATERAL LOW BACK PAIN WITH LEFT-SIDED SCIATICA: Primary | ICD-10-CM

## 2019-04-14 DIAGNOSIS — M54.42 CHRONIC BILATERAL LOW BACK PAIN WITH LEFT-SIDED SCIATICA: Primary | ICD-10-CM

## 2019-04-14 PROCEDURE — 72110 X-RAY EXAM L-2 SPINE 4/>VWS: CPT

## 2019-04-14 PROCEDURE — 25010000002 MORPHINE PER 10 MG: Performed by: EMERGENCY MEDICINE

## 2019-04-14 PROCEDURE — 99283 EMERGENCY DEPT VISIT LOW MDM: CPT

## 2019-04-14 PROCEDURE — 96372 THER/PROPH/DIAG INJ SC/IM: CPT

## 2019-04-14 RX ORDER — ONDANSETRON 4 MG/1
4 TABLET, ORALLY DISINTEGRATING ORAL ONCE
Status: COMPLETED | OUTPATIENT
Start: 2019-04-14 | End: 2019-04-14

## 2019-04-14 RX ORDER — DIAZEPAM 5 MG/1
5 TABLET ORAL ONCE
Status: COMPLETED | OUTPATIENT
Start: 2019-04-14 | End: 2019-04-14

## 2019-04-14 RX ORDER — HYDROCODONE BITARTRATE AND ACETAMINOPHEN 5; 325 MG/1; MG/1
1 TABLET ORAL EVERY 6 HOURS PRN
Qty: 12 TABLET | Refills: 0 | Status: SHIPPED | OUTPATIENT
Start: 2019-04-14 | End: 2021-09-23

## 2019-04-14 RX ADMIN — ONDANSETRON 4 MG: 4 TABLET, ORALLY DISINTEGRATING ORAL at 11:19

## 2019-04-14 RX ADMIN — MORPHINE SULFATE 4 MG: 4 INJECTION INTRAVENOUS at 11:20

## 2019-04-14 RX ADMIN — DIAZEPAM 5 MG: 5 TABLET ORAL at 11:19

## 2019-05-07 ENCOUNTER — LAB REQUISITION (OUTPATIENT)
Dept: LAB | Facility: HOSPITAL | Age: 64
End: 2019-05-07

## 2019-05-07 DIAGNOSIS — Z00.00 ENCOUNTER FOR GENERAL ADULT MEDICAL EXAMINATION WITHOUT ABNORMAL FINDINGS: ICD-10-CM

## 2019-05-07 LAB
ALBUMIN SERPL-MCNC: 4 G/DL (ref 3.5–5)
ALBUMIN/GLOB SERPL: 1.4 G/DL (ref 1.1–2.5)
ALP SERPL-CCNC: 108 U/L (ref 24–120)
ALT SERPL W P-5'-P-CCNC: 24 U/L (ref 0–54)
ANION GAP SERPL CALCULATED.3IONS-SCNC: 11 MMOL/L (ref 4–13)
AST SERPL-CCNC: 20 U/L (ref 7–45)
BILIRUB SERPL-MCNC: 0.6 MG/DL (ref 0.1–1)
BUN BLD-MCNC: 25 MG/DL (ref 5–21)
BUN/CREAT SERPL: 19.2 (ref 7–25)
CALCIUM SPEC-SCNC: 9.7 MG/DL (ref 8.4–10.4)
CHLORIDE SERPL-SCNC: 104 MMOL/L (ref 98–110)
CO2 SERPL-SCNC: 29 MMOL/L (ref 24–31)
CREAT BLD-MCNC: 1.3 MG/DL (ref 0.5–1.4)
FERRITIN SERPL-MCNC: 70.5 NG/ML (ref 11.1–264)
GFR SERPL CREATININE-BSD FRML MDRD: 42 ML/MIN/1.73
GLOBULIN UR ELPH-MCNC: 2.9 GM/DL
GLUCOSE BLD-MCNC: 149 MG/DL (ref 70–100)
IRON 24H UR-MRATE: 67 MCG/DL (ref 42–180)
IRON SATN MFR SERPL: 19 % (ref 20–45)
POTASSIUM BLD-SCNC: 4.3 MMOL/L (ref 3.5–5.3)
PROT SERPL-MCNC: 6.9 G/DL (ref 6.3–8.7)
SODIUM BLD-SCNC: 144 MMOL/L (ref 135–145)
TIBC SERPL-MCNC: 358 MCG/DL (ref 225–420)

## 2019-05-07 PROCEDURE — 80053 COMPREHEN METABOLIC PANEL: CPT | Performed by: INTERNAL MEDICINE

## 2019-05-07 PROCEDURE — 83540 ASSAY OF IRON: CPT | Performed by: INTERNAL MEDICINE

## 2019-05-07 PROCEDURE — 82728 ASSAY OF FERRITIN: CPT | Performed by: INTERNAL MEDICINE

## 2019-05-07 PROCEDURE — 83550 IRON BINDING TEST: CPT | Performed by: INTERNAL MEDICINE

## 2019-06-12 ENCOUNTER — LAB REQUISITION (OUTPATIENT)
Dept: LAB | Facility: HOSPITAL | Age: 64
End: 2019-06-12

## 2019-06-12 DIAGNOSIS — Z00.00 ENCOUNTER FOR GENERAL ADULT MEDICAL EXAMINATION WITHOUT ABNORMAL FINDINGS: ICD-10-CM

## 2019-06-12 LAB
ALBUMIN SERPL-MCNC: 4.4 G/DL (ref 3.5–5)
ALBUMIN/GLOB SERPL: 1.6 G/DL (ref 1.1–2.5)
ALP SERPL-CCNC: 111 U/L (ref 24–120)
ALT SERPL W P-5'-P-CCNC: 26 U/L (ref 0–54)
ANION GAP SERPL CALCULATED.3IONS-SCNC: 9 MMOL/L (ref 4–13)
AST SERPL-CCNC: 25 U/L (ref 7–45)
BASOPHILS # BLD AUTO: 0.04 10*3/MM3 (ref 0–0.2)
BASOPHILS NFR BLD AUTO: 0.9 % (ref 0–2)
BILIRUB SERPL-MCNC: 0.8 MG/DL (ref 0.1–1)
BUN BLD-MCNC: 23 MG/DL (ref 5–21)
BUN/CREAT SERPL: 21.7 (ref 7–25)
CALCIUM SPEC-SCNC: 9.4 MG/DL (ref 8.4–10.4)
CHLORIDE SERPL-SCNC: 107 MMOL/L (ref 98–110)
CO2 SERPL-SCNC: 27 MMOL/L (ref 24–31)
CREAT BLD-MCNC: 1.06 MG/DL (ref 0.5–1.4)
DEPRECATED RDW RBC AUTO: 45.3 FL (ref 40–54)
EOSINOPHIL # BLD AUTO: 0.05 10*3/MM3 (ref 0–0.7)
EOSINOPHIL NFR BLD AUTO: 1.1 % (ref 0–4)
ERYTHROCYTE [DISTWIDTH] IN BLOOD BY AUTOMATED COUNT: 13.3 % (ref 12–15)
FERRITIN SERPL-MCNC: 69.6 NG/ML (ref 11.1–264)
GFR SERPL CREATININE-BSD FRML MDRD: 52 ML/MIN/1.73
GLOBULIN UR ELPH-MCNC: 2.8 GM/DL
GLUCOSE BLD-MCNC: 173 MG/DL (ref 70–100)
HCT VFR BLD AUTO: 37.1 % (ref 37–47)
HGB BLD-MCNC: 11.7 G/DL (ref 12–16)
IMM GRANULOCYTES # BLD AUTO: 0.04 10*3/MM3 (ref 0–0.05)
IMM GRANULOCYTES NFR BLD AUTO: 0.9 % (ref 0–5)
IRON 24H UR-MRATE: 106 MCG/DL (ref 42–180)
IRON SATN MFR SERPL: 30 % (ref 20–45)
LYMPHOCYTES # BLD AUTO: 1.06 10*3/MM3 (ref 0.72–4.86)
LYMPHOCYTES NFR BLD AUTO: 22.7 % (ref 15–45)
MCH RBC QN AUTO: 29.5 PG (ref 28–32)
MCHC RBC AUTO-ENTMCNC: 31.5 G/DL (ref 33–36)
MCV RBC AUTO: 93.7 FL (ref 82–98)
MONOCYTES # BLD AUTO: 0.45 10*3/MM3 (ref 0.19–1.3)
MONOCYTES NFR BLD AUTO: 9.7 % (ref 4–12)
NEUTROPHILS # BLD AUTO: 3.02 10*3/MM3 (ref 1.87–8.4)
NEUTROPHILS NFR BLD AUTO: 64.7 % (ref 39–78)
NRBC BLD AUTO-RTO: 0 /100 WBC (ref 0–0.2)
PLATELET # BLD AUTO: 171 10*3/MM3 (ref 130–400)
PMV BLD AUTO: 11.6 FL (ref 6–12)
POTASSIUM BLD-SCNC: 4.2 MMOL/L (ref 3.5–5.3)
PROT SERPL-MCNC: 7.2 G/DL (ref 6.3–8.7)
RBC # BLD AUTO: 3.96 10*6/MM3 (ref 4.2–5.4)
SODIUM BLD-SCNC: 143 MMOL/L (ref 135–145)
TIBC SERPL-MCNC: 348 MCG/DL (ref 225–420)
WBC NRBC COR # BLD: 4.66 10*3/MM3 (ref 4.8–10.8)

## 2019-06-12 PROCEDURE — 80053 COMPREHEN METABOLIC PANEL: CPT | Performed by: INTERNAL MEDICINE

## 2019-06-12 PROCEDURE — 83540 ASSAY OF IRON: CPT | Performed by: INTERNAL MEDICINE

## 2019-06-12 PROCEDURE — 85025 COMPLETE CBC W/AUTO DIFF WBC: CPT | Performed by: CLINICAL NURSE SPECIALIST

## 2019-06-12 PROCEDURE — 82728 ASSAY OF FERRITIN: CPT | Performed by: INTERNAL MEDICINE

## 2019-06-12 PROCEDURE — 83036 HEMOGLOBIN GLYCOSYLATED A1C: CPT | Performed by: CLINICAL NURSE SPECIALIST

## 2019-06-12 PROCEDURE — 83550 IRON BINDING TEST: CPT | Performed by: INTERNAL MEDICINE

## 2019-06-13 LAB — HBA1C MFR BLD: 7.2 %

## 2019-07-08 ENCOUNTER — LAB REQUISITION (OUTPATIENT)
Dept: LAB | Facility: HOSPITAL | Age: 64
End: 2019-07-08

## 2019-07-08 DIAGNOSIS — Z00.00 ENCOUNTER FOR GENERAL ADULT MEDICAL EXAMINATION WITHOUT ABNORMAL FINDINGS: ICD-10-CM

## 2019-07-08 PROCEDURE — 83540 ASSAY OF IRON: CPT | Performed by: INTERNAL MEDICINE

## 2019-07-08 PROCEDURE — 83550 IRON BINDING TEST: CPT | Performed by: INTERNAL MEDICINE

## 2019-07-08 PROCEDURE — 82728 ASSAY OF FERRITIN: CPT | Performed by: INTERNAL MEDICINE

## 2019-07-08 PROCEDURE — 80053 COMPREHEN METABOLIC PANEL: CPT | Performed by: INTERNAL MEDICINE

## 2019-07-09 ENCOUNTER — LAB REQUISITION (OUTPATIENT)
Dept: LAB | Facility: HOSPITAL | Age: 64
End: 2019-07-09

## 2019-07-09 DIAGNOSIS — Z00.00 ENCOUNTER FOR GENERAL ADULT MEDICAL EXAMINATION WITHOUT ABNORMAL FINDINGS: ICD-10-CM

## 2019-07-09 LAB
ALBUMIN SERPL-MCNC: 4.3 G/DL (ref 3.5–5)
ALBUMIN/GLOB SERPL: 1.6 G/DL (ref 1.1–2.5)
ALP SERPL-CCNC: 110 U/L (ref 24–120)
ALT SERPL W P-5'-P-CCNC: 29 U/L (ref 0–54)
ANION GAP SERPL CALCULATED.3IONS-SCNC: 11 MMOL/L (ref 4–13)
AST SERPL-CCNC: 24 U/L (ref 7–45)
BILIRUB SERPL-MCNC: 0.7 MG/DL (ref 0.1–1)
BUN BLD-MCNC: 20 MG/DL (ref 5–21)
BUN/CREAT SERPL: 19.8 (ref 7–25)
CALCIUM SPEC-SCNC: 9.6 MG/DL (ref 8.4–10.4)
CHLORIDE SERPL-SCNC: 103 MMOL/L (ref 98–110)
CO2 SERPL-SCNC: 28 MMOL/L (ref 24–31)
CREAT BLD-MCNC: 1.01 MG/DL (ref 0.5–1.4)
FERRITIN SERPL-MCNC: 80 NG/ML (ref 11.1–264)
GFR SERPL CREATININE-BSD FRML MDRD: 55 ML/MIN/1.73
GLOBULIN UR ELPH-MCNC: 2.7 GM/DL
GLUCOSE BLD-MCNC: 184 MG/DL (ref 70–100)
IRON 24H UR-MRATE: 84 MCG/DL (ref 42–180)
IRON SATN MFR SERPL: 25 % (ref 20–45)
POTASSIUM BLD-SCNC: 4.4 MMOL/L (ref 3.5–5.3)
PROT SERPL-MCNC: 7 G/DL (ref 6.3–8.7)
SODIUM BLD-SCNC: 142 MMOL/L (ref 135–145)
TIBC SERPL-MCNC: 336 MCG/DL (ref 225–420)

## 2019-08-07 ENCOUNTER — LAB REQUISITION (OUTPATIENT)
Dept: LAB | Facility: HOSPITAL | Age: 64
End: 2019-08-07

## 2019-08-07 DIAGNOSIS — Z00.00 ENCOUNTER FOR GENERAL ADULT MEDICAL EXAMINATION WITHOUT ABNORMAL FINDINGS: ICD-10-CM

## 2019-08-07 LAB
ALBUMIN SERPL-MCNC: 3.9 G/DL (ref 3.5–5)
ALBUMIN/GLOB SERPL: 1.4 G/DL (ref 1.1–2.5)
ALP SERPL-CCNC: 124 U/L (ref 24–120)
ALT SERPL W P-5'-P-CCNC: 29 U/L (ref 0–54)
ANION GAP SERPL CALCULATED.3IONS-SCNC: 9 MMOL/L (ref 4–13)
AST SERPL-CCNC: 29 U/L (ref 7–45)
BILIRUB SERPL-MCNC: 0.5 MG/DL (ref 0.1–1)
BUN BLD-MCNC: 15 MG/DL (ref 5–21)
BUN/CREAT SERPL: 12.3 (ref 7–25)
CALCIUM SPEC-SCNC: 9 MG/DL (ref 8.4–10.4)
CHLORIDE SERPL-SCNC: 107 MMOL/L (ref 98–110)
CO2 SERPL-SCNC: 27 MMOL/L (ref 24–31)
CREAT BLD-MCNC: 1.22 MG/DL (ref 0.5–1.4)
FERRITIN SERPL-MCNC: 96.9 NG/ML (ref 11.1–264)
GFR SERPL CREATININE-BSD FRML MDRD: 45 ML/MIN/1.73
GLOBULIN UR ELPH-MCNC: 2.8 GM/DL
GLUCOSE BLD-MCNC: 132 MG/DL (ref 70–100)
IRON 24H UR-MRATE: 51 MCG/DL (ref 42–180)
IRON SATN MFR SERPL: 16 % (ref 20–45)
POTASSIUM BLD-SCNC: 4.1 MMOL/L (ref 3.5–5.3)
PROT SERPL-MCNC: 6.7 G/DL (ref 6.3–8.7)
SODIUM BLD-SCNC: 143 MMOL/L (ref 135–145)
TIBC SERPL-MCNC: 322 MCG/DL (ref 225–420)

## 2019-08-07 PROCEDURE — 80053 COMPREHEN METABOLIC PANEL: CPT | Performed by: INTERNAL MEDICINE

## 2019-08-07 PROCEDURE — 83550 IRON BINDING TEST: CPT | Performed by: INTERNAL MEDICINE

## 2019-08-07 PROCEDURE — 83540 ASSAY OF IRON: CPT | Performed by: INTERNAL MEDICINE

## 2019-08-07 PROCEDURE — 82728 ASSAY OF FERRITIN: CPT | Performed by: INTERNAL MEDICINE

## 2019-09-06 DIAGNOSIS — D50.9 IRON DEFICIENCY ANEMIA, UNSPECIFIED IRON DEFICIENCY ANEMIA TYPE: Primary | ICD-10-CM

## 2019-09-09 PROBLEM — N18.30 STAGE 3 CHRONIC KIDNEY DISEASE (HCC): Status: ACTIVE | Noted: 2019-09-09

## 2019-09-10 ENCOUNTER — LAB (OUTPATIENT)
Dept: ONCOLOGY | Facility: CLINIC | Age: 64
End: 2019-09-10

## 2019-09-10 DIAGNOSIS — D50.9 IRON DEFICIENCY ANEMIA, UNSPECIFIED IRON DEFICIENCY ANEMIA TYPE: ICD-10-CM

## 2019-09-10 LAB
ALBUMIN SERPL-MCNC: 4.2 G/DL (ref 3.5–5.2)
ALBUMIN/GLOB SERPL: 1.4 G/DL
ALP SERPL-CCNC: 111 U/L (ref 39–117)
ALT SERPL W P-5'-P-CCNC: 19 U/L (ref 1–33)
ANION GAP SERPL CALCULATED.3IONS-SCNC: 11 MMOL/L (ref 5–15)
AST SERPL-CCNC: 20 U/L (ref 1–32)
BILIRUB SERPL-MCNC: 0.7 MG/DL (ref 0.2–1.2)
BUN BLD-MCNC: 18 MG/DL (ref 8–23)
BUN/CREAT SERPL: 15.9 (ref 7–25)
CALCIUM SPEC-SCNC: 9.6 MG/DL (ref 8.6–10.5)
CHLORIDE SERPL-SCNC: 104 MMOL/L (ref 98–107)
CO2 SERPL-SCNC: 29 MMOL/L (ref 22–29)
CREAT BLD-MCNC: 1.13 MG/DL (ref 0.57–1)
FERRITIN SERPL-MCNC: 264.1 NG/ML (ref 13–150)
GFR SERPL CREATININE-BSD FRML MDRD: 49 ML/MIN/1.73
GLOBULIN UR ELPH-MCNC: 3.1 GM/DL
GLUCOSE BLD-MCNC: 152 MG/DL (ref 65–99)
IRON 24H UR-MRATE: 68 MCG/DL (ref 37–145)
IRON SATN MFR SERPL: 18 % (ref 20–50)
POTASSIUM BLD-SCNC: 4 MMOL/L (ref 3.5–5.2)
PROT SERPL-MCNC: 7.3 G/DL (ref 6–8.5)
SODIUM BLD-SCNC: 144 MMOL/L (ref 136–145)
TIBC SERPL-MCNC: 384 MCG/DL (ref 298–536)
TRANSFERRIN SERPL-MCNC: 258 MG/DL (ref 200–360)

## 2019-09-10 PROCEDURE — 82728 ASSAY OF FERRITIN: CPT | Performed by: INTERNAL MEDICINE

## 2019-09-10 PROCEDURE — 36415 COLL VENOUS BLD VENIPUNCTURE: CPT | Performed by: INTERNAL MEDICINE

## 2019-09-10 PROCEDURE — 83540 ASSAY OF IRON: CPT | Performed by: INTERNAL MEDICINE

## 2019-09-10 PROCEDURE — 80053 COMPREHEN METABOLIC PANEL: CPT | Performed by: INTERNAL MEDICINE

## 2019-09-10 PROCEDURE — 85025 COMPLETE CBC W/AUTO DIFF WBC: CPT | Performed by: INTERNAL MEDICINE

## 2019-09-10 PROCEDURE — 84466 ASSAY OF TRANSFERRIN: CPT | Performed by: INTERNAL MEDICINE

## 2019-09-12 LAB
BASOPHILS # BLD AUTO: 0.02 10*3/MM3 (ref 0–0.2)
BASOPHILS NFR BLD AUTO: 0.4 % (ref 0–1.5)
DEPRECATED RDW RBC AUTO: 45.8 FL (ref 37–54)
EOSINOPHIL # BLD AUTO: 0.03 10*3/MM3 (ref 0–0.4)
EOSINOPHIL NFR BLD AUTO: 0.6 % (ref 0.3–6.2)
ERYTHROCYTE [DISTWIDTH] IN BLOOD BY AUTOMATED COUNT: 12.8 % (ref 12.3–15.4)
HCT VFR BLD AUTO: 36.5 % (ref 34–46.6)
HGB BLD-MCNC: 11.4 G/DL (ref 12–15.9)
IMM GRANULOCYTES # BLD AUTO: 0.02 10*3/MM3 (ref 0–0.05)
IMM GRANULOCYTES NFR BLD AUTO: 0.4 % (ref 0–0.5)
LYMPHOCYTES # BLD AUTO: 1.18 10*3/MM3 (ref 0.7–3.1)
LYMPHOCYTES NFR BLD AUTO: 23.4 % (ref 19.6–45.3)
MCH RBC QN AUTO: 30.3 PG (ref 26.6–33)
MCHC RBC AUTO-ENTMCNC: 31.2 G/DL (ref 31.5–35.7)
MCV RBC AUTO: 97.1 FL (ref 79–97)
MONOCYTES # BLD AUTO: 0.43 10*3/MM3 (ref 0.1–0.9)
MONOCYTES NFR BLD AUTO: 8.5 % (ref 5–12)
NEUTROPHILS # BLD AUTO: 3.36 10*3/MM3 (ref 1.7–7)
NEUTROPHILS NFR BLD AUTO: 66.7 % (ref 42.7–76)
PLATELET # BLD AUTO: 167 10*3/MM3 (ref 140–450)
PMV BLD AUTO: 9.8 FL (ref 6–12)
RBC # BLD AUTO: 3.76 10*6/MM3 (ref 3.77–5.28)
WBC NRBC COR # BLD: 5.04 10*3/MM3 (ref 3.4–10.8)

## 2019-09-18 DIAGNOSIS — D50.9 IRON DEFICIENCY ANEMIA, UNSPECIFIED IRON DEFICIENCY ANEMIA TYPE: Primary | ICD-10-CM

## 2019-09-19 ENCOUNTER — OFFICE VISIT (OUTPATIENT)
Dept: ONCOLOGY | Facility: CLINIC | Age: 64
End: 2019-09-19

## 2019-09-19 ENCOUNTER — LAB (OUTPATIENT)
Dept: ONCOLOGY | Facility: CLINIC | Age: 64
End: 2019-09-19

## 2019-09-19 VITALS
BODY MASS INDEX: 51.91 KG/M2 | WEIGHT: 293 LBS | DIASTOLIC BLOOD PRESSURE: 62 MMHG | SYSTOLIC BLOOD PRESSURE: 122 MMHG | RESPIRATION RATE: 16 BRPM | TEMPERATURE: 98 F | HEIGHT: 63 IN | OXYGEN SATURATION: 97 % | HEART RATE: 64 BPM

## 2019-09-19 DIAGNOSIS — D61.818 PANCYTOPENIA (HCC): ICD-10-CM

## 2019-09-19 DIAGNOSIS — E61.1 IRON DEFICIENCY: ICD-10-CM

## 2019-09-19 DIAGNOSIS — K76.0 NON-ALCOHOLIC FATTY LIVER DISEASE: ICD-10-CM

## 2019-09-19 DIAGNOSIS — I10 ESSENTIAL HYPERTENSION: ICD-10-CM

## 2019-09-19 DIAGNOSIS — N18.30 STAGE 3 CHRONIC KIDNEY DISEASE (HCC): Primary | ICD-10-CM

## 2019-09-19 DIAGNOSIS — D50.9 IRON DEFICIENCY ANEMIA, UNSPECIFIED IRON DEFICIENCY ANEMIA TYPE: ICD-10-CM

## 2019-09-19 DIAGNOSIS — D73.1 HYPERSPLENISM: ICD-10-CM

## 2019-09-19 LAB
BASOPHILS # BLD AUTO: 0.02 10*3/MM3 (ref 0–0.2)
BASOPHILS NFR BLD AUTO: 0.4 % (ref 0–1.5)
DEPRECATED RDW RBC AUTO: 45.2 FL (ref 37–54)
EOSINOPHIL # BLD AUTO: 0.05 10*3/MM3 (ref 0–0.4)
EOSINOPHIL NFR BLD AUTO: 1 % (ref 0.3–6.2)
ERYTHROCYTE [DISTWIDTH] IN BLOOD BY AUTOMATED COUNT: 12.8 % (ref 12.3–15.4)
HCT VFR BLD AUTO: 36.5 % (ref 34–46.6)
HGB BLD-MCNC: 11.3 G/DL (ref 12–15.9)
IMM GRANULOCYTES # BLD AUTO: 0.03 10*3/MM3 (ref 0–0.05)
IMM GRANULOCYTES NFR BLD AUTO: 0.6 % (ref 0–0.5)
LYMPHOCYTES # BLD AUTO: 1.07 10*3/MM3 (ref 0.7–3.1)
LYMPHOCYTES NFR BLD AUTO: 20.4 % (ref 19.6–45.3)
MCH RBC QN AUTO: 29.8 PG (ref 26.6–33)
MCHC RBC AUTO-ENTMCNC: 31 G/DL (ref 31.5–35.7)
MCV RBC AUTO: 96.3 FL (ref 79–97)
MONOCYTES # BLD AUTO: 0.43 10*3/MM3 (ref 0.1–0.9)
MONOCYTES NFR BLD AUTO: 8.2 % (ref 5–12)
NEUTROPHILS # BLD AUTO: 3.64 10*3/MM3 (ref 1.7–7)
NEUTROPHILS NFR BLD AUTO: 69.4 % (ref 42.7–76)
PLATELET # BLD AUTO: 161 10*3/MM3 (ref 140–450)
PMV BLD AUTO: 9.7 FL (ref 6–12)
RBC # BLD AUTO: 3.79 10*6/MM3 (ref 3.77–5.28)
WBC NRBC COR # BLD: 5.24 10*3/MM3 (ref 3.4–10.8)

## 2019-09-19 PROCEDURE — 85025 COMPLETE CBC W/AUTO DIFF WBC: CPT | Performed by: NURSE PRACTITIONER

## 2019-09-19 PROCEDURE — 82607 VITAMIN B-12: CPT | Performed by: NURSE PRACTITIONER

## 2019-09-19 PROCEDURE — 99214 OFFICE O/P EST MOD 30 MIN: CPT | Performed by: NURSE PRACTITIONER

## 2019-09-19 PROCEDURE — 36415 COLL VENOUS BLD VENIPUNCTURE: CPT | Performed by: NURSE PRACTITIONER

## 2019-09-19 PROCEDURE — 82746 ASSAY OF FOLIC ACID SERUM: CPT | Performed by: NURSE PRACTITIONER

## 2019-09-19 NOTE — PROGRESS NOTES
[facility]  HEMATOLOGY & ONCOLOGY    [facility] ONCOLOGY  [address] [place] [address] [address]  [facility] [address]  [phone]    Patient Name: Mariia Parker  Encounter Date: 09/19/2019  YOB: 1955  Patient Number: [id number]    Subjective     VISIT DIAGNOSIS:   Encounter Diagnoses   Name Primary?   • Stage 3 chronic kidney disease (CMS/HCC) Yes   • Pancytopenia (CMS/HCC)    • Hypersplenism    • Essential hypertension    • Non-alcoholic fatty liver disease    • Iron deficiency         REASON FOR VISIT:     Chief Complaint   Patient presents with   • JONES     Here for followup. Had last iron infusion 2 weeks ago.   • Pancytopenia   • Anorexia   • Depression Screening     Moderate Depression        Problem List Items Addressed This Visit        Cardiovascular and Mediastinum    Hypertension    Overview     Essential (primary) hypertension            Digestive    Non-alcoholic fatty liver disease       Immune and Lymphatic    Pancytopenia (CMS/HCC)    Overview     DIAGNOSTIC ABNORMALITIES:   1. Endoscopy 03/2016: Mild erosive gastritis.  2. CBC 05/12/2016: WBC 5.4, hemoglobin 11.1, hematocrit 35.3, MCV 94.1, platelet count 193,000 with ANC 3.06.  3. Labs 09/13/2016: WBC 4.4, hemoglobin 8.7, hematocrit 28.1, MCV 93, platelets 119,000, ANC 2.73. Potassium 5.2, chloride 112, BUN 55, creatinine 4.67, AST 53, ALT 72, GFR 10. Serum iron 95, TIBC 365, TSH 5.81 (elevated). CRP less than 2.9.  4. PATHOLOGY: Comprehensive report 10/03/2016. Peripheral blood: Normochromic anemia with mild anisopoikilocytosis. Mild thrombocytopenia. Flow Impression: Peripheral blood: No increase in myeloid or lymphoid blasts identified. No immunophenotypically abnormal B- or T-cell population identified.   5. Liver/spleen scan, 01/20/2017, [facility]: Hepatomegaly. Borderline enlarged spleen.     PREVIOUS INTERVENTIONS:   1. 2 units PRBCs 10/13/2016 at  [facility].  2. Procrit 40,000 units subcutaneously 10/13/2016 through present at [facility]. Details not available.  3. Ferrous sulfate 325 mg 07/20/2017 through 01/22/2018. Resume 05/21/2018 through 09/17/2018. Resumed 11/21/2018 through 12/06/2018. Resume 05/13/2019.           Relevant Orders    Vitamin B12 & Folate    CBC & Differential    CBC & Differential    Comprehensive Metabolic Panel    Iron Profile    Ferritin    Hypersplenism    Relevant Orders    CBC & Differential    Comprehensive Metabolic Panel    Iron Profile    Ferritin       Genitourinary    Stage 3 chronic kidney disease (CMS/HCC) - Primary    Relevant Orders    CBC & Differential    CBC & Differential    Comprehensive Metabolic Panel    Iron Profile    Ferritin      Other Visit Diagnoses     Iron deficiency         Relevant Orders    Iron Profile    Ferritin          INTERVAL HISTORY  Patient ID: Mariia Parker is a 64 y.o. year old female here today in followup for pancytopenia from hypersplenism from nonalcoholic fatty liver disease. She is also seen for anemia from iron deficiency and chronic kidney disease. She is off oral iron due to GI upset and has been changed to IV Nuclecit in August when her hgb was down to 10.8, iron saturation of 16% and ferritin of 96. She just completed 3 weekly doses 2 weeks ago. She tolerated it well.   She is seen with her daughter in law today. History is obtained from the patient. History is considered to be reliable.    Her hgb responded well with her labs on 9/10/2019 show white count 5.04, hemoglobin 11.4, hematocrit 36.5.  CMP shows her GFR to be 49 otherwise stable.  Iron saturation is slighty low at 18% while ferritin is 264.1.    She presents today feeling some better since receiving iron replacement.  She states she still gets tired easily but states she knows that is related a lot to her weight and other medical problems.  She does have occasional  headaches.  Denies any chest pain.  She has a short breath with exertion.  She has no GI upset of nausea vomiting or diarrhea.    Past Medical History:   Past Medical History:   Diagnosis Date   • Abdominal hernia    • Acute renal failure syndrome (CMS/HCC)    • Anemia    • Anxiety    • Arthritis    • Carpal tunnel syndrome    • Chronic kidney disease     Chronic kidney disease, stage 4 (severe)   • Congestive heart failure (CMS/HCC)     does not currently have cardiologist   • Depression     Major depressive disorder, single episode, unspecified   • Diverticulitis    • Epistaxis    • Fatty liver    • Gastroesophageal reflux     Gastro-esophageal reflux disease without esophagitis   • Heart murmur    • Heartburn    • High cholesterol    • History of kidney stones    • Hypersplenism    • Hypertension     Essential (primary) hypertension   • Iron deficiency    • Irritable bowel syndrome     Irritable bowel syndrome without diarrhea   • Kidney disease     nephrologist follows her   • Migraines    • Non-alcoholic fatty liver disease    • Nonspecific elevation of levels of transaminase and lactic acid dehydrogenase (LDH)    • Normocytic anemia    • Obesity    • Pain     shoulder, hip, foot, muscle, joint, neck, wrist, knee, hell back and ankle   • Pancytopenia (CMS/HCC)    • Pulmonary embolism (CMS/HCC)    • Sciatica    • Seasonal allergies .   • Urinary incontinence      Past Surgical History:   Past Surgical History:   Procedure Laterality Date   • CARPAL TUNNEL RELEASE Right    • ENDOSCOPY AND COLONOSCOPY  03/2016   • FOREIGN BODY REMOVAL Right     leg   • HYSTERECTOMY      open; total   • LAPAROSCOPIC CHOLECYSTECTOMY     • SMALL INTESTINE SURGERY  06/2016   • UMBILICAL HERNIA REPAIR      not sure if has mesh in there or not (x4 surgeries)      Social History:   Social History     Socioeconomic History   • Marital status:      Spouse name: Not on file   • Number of children: Not on file   • Years of education:  Not on file   • Highest education level: Not on file   Tobacco Use   • Smoking status: Never Smoker   • Smokeless tobacco: Never Used   Substance and Sexual Activity   • Alcohol use: No   • Drug use: No   • Sexual activity: Defer     Birth control/protection: Surgical     Family History:   Family History   Problem Relation Age of Onset   • Cancer Mother    • Hypertension Mother    • Heart disease Mother    • Breast cancer Mother    • Dementia Mother    • Parkinsonism Mother    • No Known Problems Father    • Hypertension Brother    • Other Maternal Grandmother         brain tumor   • No Known Problems Maternal Grandfather    • No Known Problems Paternal Grandmother    • No Known Problems Paternal Grandfather    • No Known Problems Brother        Review of Systems   Constitutional: Positive for fatigue. Negative for activity change, appetite change, chills, diaphoresis, fever and unexpected weight change.   HENT: Negative for congestion, facial swelling, mouth sores, nosebleeds, sore throat, trouble swallowing and voice change.    Eyes: Negative for discharge and redness.   Respiratory: Positive for shortness of breath (exertional). Negative for cough, chest tightness and wheezing.    Cardiovascular: Negative for chest pain, palpitations and leg swelling.   Gastrointestinal: Negative for abdominal distention, abdominal pain, blood in stool, constipation, diarrhea, nausea and vomiting.   Endocrine: Negative.    Genitourinary: Negative for difficulty urinating, dysuria, frequency, hematuria and urgency.   Musculoskeletal: Negative for arthralgias, gait problem and myalgias.   Skin: Negative for color change and rash.   Neurological: Positive for weakness and headaches. Negative for dizziness, light-headedness and numbness.   Hematological: Negative for adenopathy. Does not bruise/bleed easily.   Psychiatric/Behavioral: Negative for confusion and sleep disturbance. The patient is not nervous/anxious.      "    Medications:    Current Outpatient Medications   Medication Sig Dispense Refill   • acetaminophen (TYLENOL 8 HOUR ARTHRITIS PAIN) 650 MG 8 hr tablet Take 650 mg by mouth Every 8 (Eight) Hours As Needed for Mild Pain .     • amLODIPine (NORVASC) 10 MG tablet Take 10 mg by mouth Daily.     • atorvastatin (LIPITOR) 40 MG tablet Take 40 mg by mouth Daily.     • carvedilol (COREG) 25 MG tablet Take 25 mg by mouth 2 (Two) Times a Day With Meals.     • colestipol (COLESTID) 1 g tablet Take 1 g by mouth 2 (Two) Times a Day.     • FLUoxetine (PROzac) 20 MG capsule Take 40 mg by mouth Daily.     • furosemide (LASIX) 20 MG tablet Take 20 mg by mouth Daily. 1 or 2 tablets daily     • gabapentin (NEURONTIN) 300 MG capsule Take 900 mg by mouth 2 (Two) Times a Day.     • linagliptin (TRADJENTA) 5 MG tablet tablet Take 5 mg by mouth Daily.     • Multiple Vitamins-Minerals (MULTIVITAMIN ADULT PO) Take  by mouth.     • VITAMIN D, ERGOCALCIFEROL, PO Take  by mouth Daily.     • Wheat Dextrin (BENEFIBER PO) Take  by mouth As Needed.     • XARELTO 20 MG tablet 20 mg Daily With Dinner.     • FERROUS SULFATE PO Take  by mouth.     • HYDROcodone-acetaminophen (NORCO) 5-325 MG per tablet Take 1 tablet by mouth Every 6 (Six) Hours As Needed for Moderate Pain . 12 tablet 0   • pantoprazole (PROTONIX) 40 MG EC tablet Take 40 mg by mouth Daily.       No current facility-administered medications for this visit.        ALLERGIES:  No Known Allergies    Objective      Vitals:    09/19/19 0917   BP: 122/62   Pulse: 64   Resp: 16   Temp: 98 °F (36.7 °C)   TempSrc: Temporal   SpO2: 97%   Weight: (!) 147 kg (323 lb)   Height: 160 cm (63\")   PainSc: 0-No pain     /62   Pulse 64   Temp 98 °F (36.7 °C) (Temporal)   Resp 16   Ht 160 cm (63\")   Wt (!) 147 kg (323 lb)   SpO2 97%   Breastfeeding? No   BMI 57.22 kg/m²     Current Status 9/19/2019   ECOG score 2       PHYSICAL EXAM:  General Appearance:    Alert, cooperative, well nourished in " no distress   Head:    Normocephalic, without obvious abnormality, atraumatic   Eyes:    PERRL, conjunctiva pink, sclera clear, EOM's intact   Ears:    Not examined   Nose:   Nares normal, septum midline, mucosa normal, no drainage    Throat:   Lips, mucosa, and tongue normal;mucous membranes moist   Neck:   Supple, Trachea midline       Lungs:     Clear to auscultation bilaterally, respirations unlabored   Chest Wall:    No tenderness or deformity    Heart:    Regular rate and rhythm, no murmur, rub or gallop   Abdomen:     Obese, Soft, non-tender, bowel sounds active all four quadrants, large hernia noted.    Extremities:   Extremities without cyanosis or edema       Skin:   Skin color, texture, turgor normal, no rashes or lesions   Lymph nodes:   Cervical, supraclavicular, and axillary nodes normal   Neurologic:   Grossly nonfocal, gait coordinated and smooth walks with   cane, cognition is   preserved.     LABS    Lab Results - Last 18 Months   Lab Units 09/19/19  0906 09/10/19  1006 06/12/19  1700   HEMOGLOBIN g/dL 11.3* 11.4* 11.7*   HEMATOCRIT % 36.5 36.5 37.1   MCV fL 96.3 97.1* 93.7   WBC 10*3/mm3 5.24 5.04 4.66*   RDW % 12.8 12.8 13.3   MPV fL 9.7 9.8 11.6   PLATELETS 10*3/mm3 161 167 171   IMM GRAN % % 0.6* 0.4 0.9   NEUTROS ABS 10*3/mm3 3.64 3.36 3.02   LYMPHS ABS 10*3/mm3 1.07 1.18 1.06   MONOS ABS 10*3/mm3 0.43 0.43 0.45   EOS ABS 10*3/mm3 0.05 0.03 0.05   BASOS ABS 10*3/mm3 0.02 0.02 0.04   IMMATURE GRANS (ABS) 10*3/mm3 0.03 0.02 0.04   NRBC /100 WBC  --   --  0.0       Lab Results - Last 18 Months   Lab Units 09/10/19  1006 08/07/19  1700 07/08/19  1630 06/12/19  1700 05/07/19  1700 04/08/19  1700   GLUCOSE mg/dL 152* 132* 184* 173* 149* 175*   SODIUM mmol/L 144 143 142 143 144 141   POTASSIUM mmol/L 4.0 4.1 4.4 4.2 4.3 4.3   CO2 mmol/L 29.0 27.0 28.0 27.0 29.0 31.0   CHLORIDE mmol/L 104 107 103 107 104 101   ANION GAP mmol/L 11.0 9.0 11.0 9.0 11.0 9.0   CREATININE mg/dL 1.13* 1.22 1.01 1.06 1.30  1.18   BUN mg/dL 18 15 20 23* 25* 26*   BUN / CREAT RATIO  15.9 12.3 19.8 21.7 19.2 22.0   CALCIUM mg/dL 9.6 9.0 9.6 9.4 9.7 9.4   EGFR IF NONAFRICN AM mL/min/1.73 49* 45* 55* 52* 42* 46*   ALK PHOS U/L 111 124* 110 111 108 116   TOTAL PROTEIN g/dL 7.3 6.7 7.0 7.2 6.9 6.8   ALT (SGPT) U/L 19 29 29 26 24 21   AST (SGOT) U/L 20 29 24 25 20 22   BILIRUBIN mg/dL 0.7 0.5 0.7 0.8 0.6 0.7   ALBUMIN g/dL 4.20 3.90 4.30 4.40 4.00 3.80   GLOBULIN gm/dL 3.1 2.8 2.7 2.8 2.9 3.0       Lab Results - Last 18 Months   Lab Units 09/10/19  1006 08/07/19  1700 07/08/19  1630 06/12/19  1700 05/07/19  1700 04/08/19  1700  05/17/18  1025   IRON mcg/dL 68 51 84 106 67 70   < >  --    TIBC mcg/dL 384 322 336 348 358 351   < >  --    IRON SATURATION % 18* 16* 25 30 19* 20   < >  --    FERRITIN ng/mL 264.10* 96.90 80.00 69.60 70.50 72.10   < >  --    TSH mIU/mL  --   --   --   --   --   --   --  3.230    < > = values in this interval not displayed.         Assessment/Plan     Patient Active Problem List   Diagnosis   • Body mass index (BMI) of 50-59.9 in adult (CMS/HCC)   • Stage 3 chronic kidney disease (CMS/HCC)   • Pancytopenia (CMS/HCC)   • Hypersplenism   • Hypertension   • Non-alcoholic fatty liver disease        1. Stage 3 chronic kidney disease (CMS/HCC)  Chronic kidney disease stage III with GFR remaining stable at 49ml/min.  This is stable for the patient.  She was encouraged to continue following with nephrology for management, Dr. Delarosa.  She has anemia which is secondary to this chronic kidney disease stage III.  She is a candidate for Procrit therapy when she meets guidelines.    2. Pancytopenia (CMS/HCC)  Pancytopenia secondary to hypertension.  Her counts remain stable.  Her anemia is multifactorial as she also has an element of chronic kidney disease and iron deficiency.  Her white blood cell count and platelet count are remaining stable within normal range.    3. Hypersplenism  Hypersplenism secondary to underlying liver  disease.  The hypersplenism is causing the pancytopenia.This is remaining stable.     4. Essential hypertension  Blood pressure is stable at 122/62.  She was encouraged to continue to follow with PCP for management.    5. Non-alcoholic fatty liver disease  Non-alcoholic fatty liver disease that is remaining stable. His LFTs are remaining within normal range.     6. Iron deficiency  Iron deficiency requiring IV iron replenishment. She does not tolerate the oral iron therapy due to GI upset. On 8/7/2019, her hemoglobin was at 10.8 and iron saturation was at 16%.  Her ferritin was at 96.  She underwent 3 doses of IV Nuclecit. She just completed this 2 weeks ago. Her hgb responded well and now up 11.3. Her iron saturation is up to 18 with a ferritin of 264. She tolerated it well.      PLAN  1. Obtain CBC every 4 weeks, if Hgb <10 and Hct <30 initiate Procrit therapy at 40,000 units Subcutaneously. Once intiiated will have a target hemoglobin of 11 and hematocrit of 33%.  Move CBC to weekly if she starts Procrit.  2. Check B12 and folate level today  3. Continue to follow with primary care provider and other specialists.  4. Return to office in 3 mo for followup and preoffice labs of CBC, CMP, Iron studies  5. Advised to call with any problems.     Orders Placed This Encounter   Procedures   • Vitamin B12 & Folate   • Comprehensive Metabolic Panel     Standing Status:   Future   • Iron Profile     Standing Status:   Future     Standing Expiration Date:   9/19/2020   • Ferritin     Standing Status:   Future     Standing Expiration Date:   9/19/2020   • CBC & Differential     Standing Status:   Future     Standing Expiration Date:   9/18/2020     Order Specific Question:   Manual Differential     Answer:   No   • CBC & Differential     Standing Status:   Future       I have spent a total of  __30__  minutes in face-to-face encounter with the patient, out of which more than 50% was counseling the patient and family  regarding coordination of care.  Counseling included but not limited to time spent reviewing labs, treatment plan as well as answering questions.     All activities occurring within this visit are in accordance with the plan of care as set forth by Dr. Dennis Lewis.    Bre Belle, APRN    9/19/2019    10:39 AM

## 2019-09-20 LAB
FOLATE SERPL-MCNC: 19.5 NG/ML (ref 4.78–24.2)
VIT B12 BLD-MCNC: 373 PG/ML (ref 211–946)

## 2019-10-10 DIAGNOSIS — D50.9 IRON DEFICIENCY ANEMIA, UNSPECIFIED IRON DEFICIENCY ANEMIA TYPE: Primary | ICD-10-CM

## 2019-10-24 ENCOUNTER — CLINICAL SUPPORT (OUTPATIENT)
Dept: ONCOLOGY | Facility: CLINIC | Age: 64
End: 2019-10-24

## 2019-10-24 ENCOUNTER — LAB (OUTPATIENT)
Dept: ONCOLOGY | Facility: CLINIC | Age: 64
End: 2019-10-24

## 2019-10-24 VITALS
DIASTOLIC BLOOD PRESSURE: 70 MMHG | SYSTOLIC BLOOD PRESSURE: 132 MMHG | RESPIRATION RATE: 18 BRPM | TEMPERATURE: 97.6 F | OXYGEN SATURATION: 98 % | HEART RATE: 56 BPM

## 2019-10-24 DIAGNOSIS — D50.9 IRON DEFICIENCY ANEMIA, UNSPECIFIED IRON DEFICIENCY ANEMIA TYPE: ICD-10-CM

## 2019-10-24 DIAGNOSIS — N18.30 STAGE 3 CHRONIC KIDNEY DISEASE (HCC): ICD-10-CM

## 2019-10-24 DIAGNOSIS — N18.30 ANEMIA IN STAGE 3 CHRONIC KIDNEY DISEASE (HCC): ICD-10-CM

## 2019-10-24 DIAGNOSIS — D63.1 ANEMIA IN STAGE 3 CHRONIC KIDNEY DISEASE (HCC): ICD-10-CM

## 2019-10-24 LAB
BASOPHILS # BLD AUTO: 0.03 10*3/MM3 (ref 0–0.2)
BASOPHILS NFR BLD AUTO: 0.7 % (ref 0–1.5)
DEPRECATED RDW RBC AUTO: 48 FL (ref 37–54)
EOSINOPHIL # BLD AUTO: 0.02 10*3/MM3 (ref 0–0.4)
EOSINOPHIL NFR BLD AUTO: 0.5 % (ref 0.3–6.2)
ERYTHROCYTE [DISTWIDTH] IN BLOOD BY AUTOMATED COUNT: 13.3 % (ref 12.3–15.4)
HCT VFR BLD AUTO: 36.4 % (ref 34–46.6)
HGB BLD-MCNC: 11.2 G/DL (ref 12–15.9)
IMM GRANULOCYTES # BLD AUTO: 0.02 10*3/MM3 (ref 0–0.05)
IMM GRANULOCYTES NFR BLD AUTO: 0.5 % (ref 0–0.5)
LYMPHOCYTES # BLD AUTO: 1.03 10*3/MM3 (ref 0.7–3.1)
LYMPHOCYTES NFR BLD AUTO: 24.2 % (ref 19.6–45.3)
MCH RBC QN AUTO: 29.9 PG (ref 26.6–33)
MCHC RBC AUTO-ENTMCNC: 30.8 G/DL (ref 31.5–35.7)
MCV RBC AUTO: 97.3 FL (ref 79–97)
MONOCYTES # BLD AUTO: 0.48 10*3/MM3 (ref 0.1–0.9)
MONOCYTES NFR BLD AUTO: 11.3 % (ref 5–12)
NEUTROPHILS # BLD AUTO: 2.67 10*3/MM3 (ref 1.7–7)
NEUTROPHILS NFR BLD AUTO: 62.8 % (ref 42.7–76)
PLATELET # BLD AUTO: 188 10*3/MM3 (ref 140–450)
PMV BLD AUTO: 9.7 FL (ref 6–12)
RBC # BLD AUTO: 3.74 10*6/MM3 (ref 3.77–5.28)
WBC NRBC COR # BLD: 4.25 10*3/MM3 (ref 3.4–10.8)

## 2019-10-24 PROCEDURE — 99211 OFF/OP EST MAY X REQ PHY/QHP: CPT | Performed by: NURSE PRACTITIONER

## 2019-10-24 PROCEDURE — 36415 COLL VENOUS BLD VENIPUNCTURE: CPT | Performed by: INTERNAL MEDICINE

## 2019-10-24 PROCEDURE — 85025 COMPLETE CBC W/AUTO DIFF WBC: CPT | Performed by: INTERNAL MEDICINE

## 2019-10-24 NOTE — PROGRESS NOTES
Patient at clinic for lab evaluation and possible Procrit.  Patient has anemia due to stage III Chronic kidney disease.  Patient received Procrit 40,000 units SQ in 09/2017 but has not required a shot since then.  Will initiate Procrit when Hgb <10 and Hct <30.  Patient states that she is feeling well, just gets tired with activity.  Skin w/d to touch.  Color wnl.  Eating and drinking well.  Reviewed labs with patient.  Hgb 11.2, Hct 36.4-patient does not require a Procrit shot today.  Will return in 4 weeks for same.  Instructed to call with any problems.  Patient v/u.

## 2019-11-14 ENCOUNTER — LAB (OUTPATIENT)
Dept: ONCOLOGY | Facility: CLINIC | Age: 64
End: 2019-11-14

## 2019-11-14 ENCOUNTER — CLINICAL SUPPORT (OUTPATIENT)
Dept: ONCOLOGY | Facility: CLINIC | Age: 64
End: 2019-11-14

## 2019-11-14 VITALS
HEART RATE: 72 BPM | OXYGEN SATURATION: 97 % | TEMPERATURE: 98.2 F | RESPIRATION RATE: 16 BRPM | DIASTOLIC BLOOD PRESSURE: 58 MMHG | SYSTOLIC BLOOD PRESSURE: 152 MMHG

## 2019-11-14 DIAGNOSIS — D63.1 ANEMIA IN STAGE 3 CHRONIC KIDNEY DISEASE (HCC): ICD-10-CM

## 2019-11-14 DIAGNOSIS — N18.30 ANEMIA IN STAGE 3 CHRONIC KIDNEY DISEASE (HCC): ICD-10-CM

## 2019-11-14 DIAGNOSIS — N18.30 STAGE 3 CHRONIC KIDNEY DISEASE (HCC): ICD-10-CM

## 2019-11-14 LAB
BASOPHILS # BLD AUTO: 0.02 10*3/MM3 (ref 0–0.2)
BASOPHILS NFR BLD AUTO: 0.4 % (ref 0–1.5)
DEPRECATED RDW RBC AUTO: 47.2 FL (ref 37–54)
EOSINOPHIL # BLD AUTO: 0.1 10*3/MM3 (ref 0–0.4)
EOSINOPHIL NFR BLD AUTO: 2 % (ref 0.3–6.2)
ERYTHROCYTE [DISTWIDTH] IN BLOOD BY AUTOMATED COUNT: 12.8 % (ref 12.3–15.4)
HCT VFR BLD AUTO: 38.9 % (ref 34–46.6)
HGB BLD-MCNC: 11.8 G/DL (ref 12–15.9)
IMM GRANULOCYTES # BLD AUTO: 0.04 10*3/MM3 (ref 0–0.05)
IMM GRANULOCYTES NFR BLD AUTO: 0.8 % (ref 0–0.5)
LYMPHOCYTES # BLD AUTO: 1.52 10*3/MM3 (ref 0.7–3.1)
LYMPHOCYTES NFR BLD AUTO: 29.9 % (ref 19.6–45.3)
MCH RBC QN AUTO: 29.9 PG (ref 26.6–33)
MCHC RBC AUTO-ENTMCNC: 30.3 G/DL (ref 31.5–35.7)
MCV RBC AUTO: 98.5 FL (ref 79–97)
MONOCYTES # BLD AUTO: 0.45 10*3/MM3 (ref 0.1–0.9)
MONOCYTES NFR BLD AUTO: 8.9 % (ref 5–12)
NEUTROPHILS # BLD AUTO: 2.95 10*3/MM3 (ref 1.7–7)
NEUTROPHILS NFR BLD AUTO: 58 % (ref 42.7–76)
PLATELET # BLD AUTO: 185 10*3/MM3 (ref 140–450)
PMV BLD AUTO: 9.6 FL (ref 6–12)
RBC # BLD AUTO: 3.95 10*6/MM3 (ref 3.77–5.28)
WBC NRBC COR # BLD: 5.08 10*3/MM3 (ref 3.4–10.8)

## 2019-11-14 PROCEDURE — 99211 OFF/OP EST MAY X REQ PHY/QHP: CPT | Performed by: NURSE PRACTITIONER

## 2019-11-14 PROCEDURE — 85025 COMPLETE CBC W/AUTO DIFF WBC: CPT | Performed by: NURSE PRACTITIONER

## 2019-11-14 PROCEDURE — 36415 COLL VENOUS BLD VENIPUNCTURE: CPT | Performed by: NURSE PRACTITIONER

## 2019-11-14 NOTE — PROGRESS NOTES
Patient here for lab evaluation and possible Procrit injection for stage III chronic kidney disease. Patient states that she is feeling well.  Gets a little tired with exertion.  Skin w/d to touch.  Color wnl.  Eating and drinking well.  Patient has received one Procrit injection in the past-was given on 09/2017.  Patient will have to meet initial parameters of Hgb <10 and Hct <30 to qualify for a Procrit injection.  Reviewed labs with patient.  Hbg 11.8 and Hct 38.9.  Patient does not qualify for an injection today.  Will return in one month to see JOAQUIN Vigil for follow-up.  Instructed to call with any problems.  Patient v/u.

## 2019-12-11 NOTE — PROGRESS NOTES
[facility]  HEMATOLOGY & ONCOLOGY    [facility] ONC [facility] ONCOLOGY  [address] [place] [address] [address]  [facility] [address]  [phone]    Patient Name: Mariia Parker  Encounter Date: 12/12/2019  YOB: 1955  Patient Number: [id number]    Chief Complaint   Patient presents with   • Chronic Kidney Disease     here for 3 mo follow up        REASON FOR VISIT: Ms. 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 She is also seen for anemia from iron deficiency. She was intolerant to oral iron therapy and was switched to Nulecit in August 2019. She is seen alone. History is obtained from the patient. History is considered to be reliable.     She presents today feeling well other than some stress at home due to her 17yo granddaughter passing away in October.  She has a large abdominal hernia that bothers her intermittently but no significant pain or other problems.  She has had no GI upset, fever or shortness of breath.    Labs today show a stable hgb of 11.2, MCV97.5, plts of 154,000 and wbc of 4.60.      DIAGNOSTIC ABNORMALITIES:   1. Endoscopy 03/2016: Mild erosive gastritis.  2. CBC 05/12/2016: WBC 5.4, hemoglobin 11.1, hematocrit 35.3, MCV 94.1, platelet count 193,000 with ANC 3.06.  3. Labs 09/13/2016: WBC 4.4, hemoglobin 8.7, hematocrit 28.1, MCV 93, platelets 119,000, ANC 2.73. Potassium 5.2, chloride 112, BUN 55, creatinine 4.67, AST 53, ALT 72, GFR 10. Serum iron 95, TIBC 365, TSH 5.81 (elevated). CRP less than 2.9.  4. PATHOLOGY: Comprehensive report 10/03/2016. Peripheral blood: Normochromic anemia with mild anisopoikilocytosis. Mild thrombocytopenia. Flow Impression: Peripheral blood: No increase in myeloid or lymphoid blasts identified. No immunophenotypically abnormal B- or T-cell population identified.   5. Liver/spleen scan,  01/20/2017, [facility]: Hepatomegaly. Borderline enlarged spleen.     PREVIOUS INTERVENTIONS:   1. 2 units PRBCs 10/13/2016 at [facility].  2. Procrit 40,000 units subcutaneously 10/13/2016 through present at [facility]. Details not available.  3. Ferrous sulfate 325 mg 07/20/2017 through 01/22/2018. Resume 05/21/2018 through 09/17/2018. Resumed 11/21/2018 through 12/06/2018. Resume 05/13/2019.  4. Nulecit 125mg weekly x3 8/13/19, 8/20/19, 8/27/19      PAST MEDICAL HISTORY:  ALLERGIES:  No Known Allergies    CURRENT MEDICATIONS:  Outpatient Encounter Medications as of 12/12/2019   Medication Sig Dispense Refill   • acetaminophen (TYLENOL 8 HOUR ARTHRITIS PAIN) 650 MG 8 hr tablet Take 650 mg by mouth Every 8 (Eight) Hours As Needed for Mild Pain .     • amLODIPine (NORVASC) 10 MG tablet Take 10 mg by mouth Daily.     • atorvastatin (LIPITOR) 40 MG tablet Take 40 mg by mouth Daily.     • carvedilol (COREG) 25 MG tablet Take 25 mg by mouth 2 (Two) Times a Day With Meals.     • colestipol (COLESTID) 1 g tablet Take 1 g by mouth 2 (Two) Times a Day.     • FLUoxetine (PROzac) 20 MG capsule Take 40 mg by mouth Daily.     • furosemide (LASIX) 20 MG tablet Take 20 mg by mouth Daily. 1 or 2 tablets daily     • gabapentin (NEURONTIN) 300 MG capsule Take 900 mg by mouth 2 (Two) Times a Day.     • linagliptin (TRADJENTA) 5 MG tablet tablet Take 5 mg by mouth Daily.     • Multiple Vitamins-Minerals (MULTIVITAMIN ADULT PO) Take  by mouth.     • pantoprazole (PROTONIX) 40 MG EC tablet Take 40 mg by mouth Daily.     • VITAMIN D, ERGOCALCIFEROL, PO Take  by mouth Daily.     • Wheat Dextrin (BENEFIBER PO) Take  by mouth As Needed.     • XARELTO 20 MG tablet 20 mg Daily With Dinner.     • HYDROcodone-acetaminophen (NORCO) 5-325 MG per tablet Take 1 tablet by mouth Every 6 (Six) Hours As Needed for Moderate Pain . 12 tablet 0   • [DISCONTINUED] FERROUS SULFATE PO Take  by  mouth.       No facility-administered encounter medications on file as of 12/12/2019.      ADULT ILLNESSES:  Patient Active Problem List   Diagnosis Code   • Body mass index (BMI) of 50-59.9 in adult (CMS/[facility]) Z68.43   • Stage 3 chronic kidney disease (CMS/HCC) N18.3   • Pancytopenia (CMS/[facility]) D61.818   • Hypersplenism D73.1   • Hypertension I10   • Non-alcoholic fatty liver disease K76.0   • Anemia in stage 3 chronic kidney disease (CMS/HCC) N18.3, D63.1     SURGERIES:  Past Surgical History:   Procedure Laterality Date   • CARPAL TUNNEL RELEASE Right    • ENDOSCOPY AND COLONOSCOPY  03/2016   • FOREIGN BODY REMOVAL Right     leg   • HYSTERECTOMY      open; total   • LAPAROSCOPIC CHOLECYSTECTOMY     • SMALL INTESTINE SURGERY  06/2016   • UMBILICAL HERNIA REPAIR      not sure if has mesh in there or not (x4 surgeries)      HEALTH MAINTENANCE ITEMS:  Health Maintenance Due   Topic Date Due   • URINE MICROALBUMIN  1955   • TDAP/TD VACCINES (1 - Tdap) 05/16/1967   • PNEUMOCOCCAL VACCINE (19-64 HIGHEST RISK) (1 of 3 - PCV13) 05/16/1975   • ZOSTER VACCINE (1 of 2) 05/16/2006   • HEPATITIS C SCREENING  05/17/2018   • MEDICARE ANNUAL WELLNESS  05/17/2018   • DIABETIC FOOT EXAM  05/17/2018   • PAP SMEAR  05/17/2018   • LIPID PANEL  05/17/2018   • DIABETIC EYE EXAM  05/17/2018   • COLONOSCOPY  05/17/2018   • MAMMOGRAM  10/26/2018   • INFLUENZA VACCINE  08/01/2019   • HEMOGLOBIN A1C  12/12/2019       <no information>  Last Completed Colonoscopy       Status Date      COLONOSCOPY No completions recorded          There is no immunization history on file for this patient.  Last Completed Mammogram       Status Date      MAMMOGRAM Done 10/26/2016 SCANNED - MAMMO     negative        FAMILY HISTORY:  Family History   Problem Relation Age of Onset   • Cancer Mother    • Hypertension Mother    • Heart disease Mother    • Breast cancer Mother    • Dementia Mother    • Parkinsonism Mother    • No Known Problems Father    •  "Hypertension Brother    • Other Maternal Grandmother         brain tumor   • No Known Problems Maternal Grandfather    • No Known Problems Paternal Grandmother    • No Known Problems Paternal Grandfather    • No Known Problems Brother      SOCIAL HISTORY:  Social History     Socioeconomic History   • Marital status:      Spouse name: Not on file   • Number of children: Not on file   • Years of education: Not on file   • Highest education level: Not on file   Tobacco Use   • Smoking status: Never Smoker   • Smokeless tobacco: Never Used   Substance and Sexual Activity   • Alcohol use: No   • Drug use: No   • Sexual activity: Defer     Birth control/protection: Surgical       REVIEW OF SYSTEMS:  Constitutional:   The patient's appetite is good. She has gained 28 pounds since her last visit. \"Too many.\" Her energy is fair. She has no fevers, chills, or drenching night sweats. Her sleep habits are good.  Ear/Nose/Throat/Mouth:   She reports no ear discharge, sore throat, sinus congestion, nosebleeds, or gum bleeding. She reports no headaches. She has no hoarseness, change in voice quality, or hemoptysis.   Ocular:   She reports no eye pain, change in visual acuity, double vision, or blurry vision.  Respiratory:   She reports shortness of breath with mild exertion. She has no cough, phlegm production, or chest wall pain.  Cardiovascular:   She reports no exertional chest pain, chest pressure, or chest heaviness. She has no claudication. She reports no palpitations or orthostasis.  Gastrointestinal:   She reports no dysphagia, nausea, vomiting, abdominal pain, change in bowel habits, or discoloration of the stool. She reports no rectal bleeding. She reports no constipation or diarrhea.   Genitourinary:   She reports no urinary burning, hesitancy, frequency, or discoloration. She reports no difficulty controlling her bladder. She reports no need to urinate frequently through the night.  Musculoskeletal:   She " "reports pain from her back to her legs. \"I have sciatica and arthritis. My back is deteriorating more.\" She reports no unexplained arthralgias, myalgias, or leg cramping.  Extremities:   She reports trouble with fluid retention and lower extremity swelling. \"I take water pills.\"  Endocrine:   She reports no problems with excess thirst, excessive urination, vasomotor instability, or fatigue.  Heme/Lymphatic:   She reports no unexplained bleeding, bruising, petechial rashes, or swollen glands.  Skin:   She reports no itching, rashes, or sore.  Neuro:   She reports no loss of consciousness, seizures, lightheadedness, or fainting. She reports no weakness of her face, arms, or legs. She has no difficulty with speech. She has no tremors.  Psych:   She is satisfied with life. She has no depression. She reports no mood swings. She reports no anxiety.    /85   Pulse 63   Temp 98.1 °F (36.7 °C)   Resp 16   Ht 160 cm (63\")   Wt (!) 141 kg (311 lb)   SpO2 98%   Breastfeeding No   BMI 55.09 kg/m²  Body surface area is 2.33 meters squared.  Pain Score    12/12/19 0859   PainSc: 0-No pain       Physical Exam:  General:   She is a well-developed, well-nourished, and well-kept female who is comfortable. She arrived in the exam room ambulatory with a cane. She appears to be her stated age. Her skin color is pale.  Head/Neck:   The patient is anicteric and atraumatic. The neck is supple without evidence of jugular venous distention or cervical adenopathy.   Eyes:   The pupils are equal and round. There is no scleral jaundice or erythema.   Chest:   The respiratory efforts are normal and unhindered. The chest is clear to auscultation and percussion. There are no wheezes, rales, or asymmetry of breath sounds.  Cardiovascular:   The patient has a regular cardiac rate and rhythm without murmur.   Abdomen:   The belly is soft and globose. Not useful for exam. There is no rebound or guarding. There is no tenderness. Bowel " sounds are normoactive.  Extremities:   There is no evidence of cyanosis or clubbing. Chronic bilateral ankle edema.  Rheumatologic:   There is no overt evidence of rheumatoid deformities of the hands. Her gait is normal with her cane.  Cutaneous:   There are no overt rashes, purpura, petechiae, or ecchymosis.  Lymphatics:   There is no evidence of adenopathy in the cervical, supraclavicular, and axillary areas.   Neurologic:   The patient is alert, oriented, cooperative, and pleasant. She is appropriately conversant. She ambulated into the exam room without assistance and transferred from chair to exam table without assistance. There is no overt dysfunction of the motor, sensory, cerebellar systems.  Psych:   Mood and affect are appropriate for circumstance. Eye contact is appropriate. Normal thought content and judgement.    Mariia COX Parker reports a pain score of 0.  Patient's Body mass index is 55.09 kg/m². BMI is above normal parameters. Recommendations include: nutrition counseling.    LABS    Lab Results - Last 18 Months   Lab Units 12/12/19  0844 11/14/19  1037 10/24/19  0929 09/19/19  0906 09/10/19  1006 06/12/19  1700   HEMOGLOBIN g/dL 11.2* 11.8* 11.2* 11.3* 11.4* 11.7*   HEMATOCRIT % 35.7 38.9 36.4 36.5 36.5 37.1   MCV fL 97.5* 98.5* 97.3* 96.3 97.1* 93.7   WBC 10*3/mm3 4.60 5.08 4.25 5.24 5.04 4.66*   RDW % 12.6 12.8 13.3 12.8 12.8 13.3   MPV fL 9.2 9.6 9.7 9.7 9.8 11.6   PLATELETS 10*3/mm3 154 185 188 161 167 171   IMM GRAN % % 0.4 0.8* 0.5 0.6* 0.4 0.9   NEUTROS ABS 10*3/mm3 2.88 2.95 2.67 3.64 3.36 3.02   LYMPHS ABS 10*3/mm3 1.16 1.52 1.03 1.07 1.18 1.06   MONOS ABS 10*3/mm3 0.43 0.45 0.48 0.43 0.43 0.45   EOS ABS 10*3/mm3 0.09 0.10 0.02 0.05 0.03 0.05   BASOS ABS 10*3/mm3 0.02 0.02 0.03 0.02 0.02 0.04   IMMATURE GRANS (ABS) 10*3/mm3 0.02 0.04 0.02 0.03 0.02 0.04   NRBC /100 WBC  --   --   --   --   --  0.0       Lab Results - Last 18 Months   Lab Units 09/10/19  1006 08/07/19  1700 07/08/19  1630  06/12/19  1700 05/07/19  1700 04/08/19  1700   GLUCOSE mg/dL 152* 132* 184* 173* 149* 175*   SODIUM mmol/L 144 143 142 143 144 141   POTASSIUM mmol/L 4.0 4.1 4.4 4.2 4.3 4.3   CO2 mmol/L 29.0 27.0 28.0 27.0 29.0 31.0   CHLORIDE mmol/L 104 107 103 107 104 101   ANION GAP mmol/L 11.0 9.0 11.0 9.0 11.0 9.0   CREATININE mg/dL 1.13* 1.22 1.01 1.06 1.30 1.18   BUN mg/dL 18 15 20 23* 25* 26*   BUN / CREAT RATIO  15.9 12.3 19.8 21.7 19.2 22.0   CALCIUM mg/dL 9.6 9.0 9.6 9.4 9.7 9.4   EGFR IF NONAFRICN AM mL/min/1.73 49* 45* 55* 52* 42* 46*   ALK PHOS U/L 111 124* 110 111 108 116   TOTAL PROTEIN g/dL 7.3 6.7 7.0 7.2 6.9 6.8   ALT (SGPT) U/L 19 29 29 26 24 21   AST (SGOT) U/L 20 29 24 25 20 22   BILIRUBIN mg/dL 0.7 0.5 0.7 0.8 0.6 0.7   ALBUMIN g/dL 4.20 3.90 4.30 4.40 4.00 3.80   GLOBULIN gm/dL 3.1 2.8 2.7 2.8 2.9 3.0     Lab Results - Last 18 Months   Lab Units 09/19/19  1009 09/10/19  1006 08/07/19  1700 07/08/19  1630 06/12/19  1700 05/07/19  1700 04/08/19  1700   IRON mcg/dL  --  68 51 84 106 67 70   TIBC mcg/dL  --  384 322 336 348 358 351   IRON SATURATION %  --  18* 16* 25 30 19* 20   FERRITIN ng/mL  --  264.10* 96.90 80.00 69.60 70.50 72.10   FOLATE ng/mL 19.50  --   --   --   --   --   --      ASSESSMENT:   1. Pancytopenia, consider hypersplenism from non-alcoholic fatty liver disease.   2. Normocytic anemia component iron deficiency and chronic kidney disease Stage III, GFR 49 mL/minute on 09/10/2019.   3. Iron deficiency s/p Nulecit in August 2019. Ferritin improved in Sept to 264, saturation slightly low at 18%. Stable hgb.   4. Obesity, BMI 55.09.   5. Chronic kidney disease stage, GFR 49 mL/minute on 09/10/2019.   6. Acute renal failure, 09/13/2016. GFR 10 mL/minute 09/13/2016.   7. Non-alcoholic fatty liver disease.   8. Transaminitis from non-alcoholic fatty liver disease - improved and LFTs normal in Septmeber.    PLAN:   1.  Re: Interval hemoglobin 11.2 gm on 12/12/2019 and 11.8 gm on 11/14/2019.    2.  Re: Heme status. Hemoglobin 11.2 gm.   3.  Re: Pre-office CMP. GFR 49 ml/minute.   4.  Re: Pre-office ferritin, TIBC, % saturation and iron. 18% saturation and ferritin 264 ng/ml in September. Todays pending  5.  Re: Iron replacement if needed   6. Procrit 40,000 units subcutaneously weekly if hemoglobin less than 10 and hematocrit less than 30 to target a hemoglobin of 11 and hematocrit of 33% at [facility]. Move CBC to weekly if she starts Procrit.   7. Transfuse 2 units PRBCs if hemoglobin below 8 gm. Premed Tylenol 500 mg p.o. and Benadryl 25 mg IV. Lasix 20 mg IVP after each unit of PRBC. Move CBC every week if she starts Procrit.   8. CBC with differential every 8 weeks.   9. Ferritin, TIBC, % saturation, iron, and CMP every 8 weeks.   10. Continue current medicines.   11. Continue ongoing management per primary care physician and other specialists.   12. Plan of care discussed with patient. Understanding expressed. Patient agreeable to proceed.   13. Return to [place] office in 4 months.    TIME SPENT: Face to face time on this encounter,as defined by the American Medical Association in the 2019 Current Procedural Terminology codebook; assessment, record review, lab review, planning and education is 30 minutes.     JOAQUIN Gordon   12/12/2019  9:57 AM    cc: JOAQUIN Cosby MD Elias Pitaras, MD Dale Jones, MD

## 2019-12-12 ENCOUNTER — LAB (OUTPATIENT)
Dept: ONCOLOGY | Facility: CLINIC | Age: 64
End: 2019-12-12

## 2019-12-12 ENCOUNTER — OFFICE VISIT (OUTPATIENT)
Dept: ONCOLOGY | Facility: CLINIC | Age: 64
End: 2019-12-12

## 2019-12-12 VITALS
RESPIRATION RATE: 16 BRPM | BODY MASS INDEX: 51.91 KG/M2 | HEIGHT: 63 IN | TEMPERATURE: 98.1 F | OXYGEN SATURATION: 98 % | DIASTOLIC BLOOD PRESSURE: 85 MMHG | SYSTOLIC BLOOD PRESSURE: 152 MMHG | HEART RATE: 63 BPM | WEIGHT: 293 LBS

## 2019-12-12 DIAGNOSIS — D61.818 PANCYTOPENIA (HCC): ICD-10-CM

## 2019-12-12 DIAGNOSIS — N18.30 ANEMIA IN STAGE 3 CHRONIC KIDNEY DISEASE (HCC): Primary | ICD-10-CM

## 2019-12-12 DIAGNOSIS — D73.1 HYPERSPLENISM: ICD-10-CM

## 2019-12-12 DIAGNOSIS — D63.1 ANEMIA IN STAGE 3 CHRONIC KIDNEY DISEASE (HCC): Primary | ICD-10-CM

## 2019-12-12 DIAGNOSIS — E61.1 IRON DEFICIENCY: ICD-10-CM

## 2019-12-12 DIAGNOSIS — N18.30 STAGE 3 CHRONIC KIDNEY DISEASE (HCC): ICD-10-CM

## 2019-12-12 LAB
ALBUMIN SERPL-MCNC: 4.3 G/DL (ref 3.5–5.2)
ALBUMIN/GLOB SERPL: 1.4 G/DL
ALP SERPL-CCNC: 134 U/L (ref 39–117)
ALT SERPL W P-5'-P-CCNC: 29 U/L (ref 1–33)
ANION GAP SERPL CALCULATED.3IONS-SCNC: 12 MMOL/L (ref 5–15)
AST SERPL-CCNC: 23 U/L (ref 1–32)
BASOPHILS # BLD AUTO: 0.02 10*3/MM3 (ref 0–0.2)
BASOPHILS NFR BLD AUTO: 0.4 % (ref 0–1.5)
BILIRUB SERPL-MCNC: 0.6 MG/DL (ref 0.2–1.2)
BUN BLD-MCNC: 23 MG/DL (ref 8–23)
BUN/CREAT SERPL: 20.5 (ref 7–25)
CALCIUM SPEC-SCNC: 9.7 MG/DL (ref 8.6–10.5)
CHLORIDE SERPL-SCNC: 100 MMOL/L (ref 98–107)
CO2 SERPL-SCNC: 31 MMOL/L (ref 22–29)
CREAT BLD-MCNC: 1.12 MG/DL (ref 0.57–1)
DEPRECATED RDW RBC AUTO: 44.8 FL (ref 37–54)
EOSINOPHIL # BLD AUTO: 0.09 10*3/MM3 (ref 0–0.4)
EOSINOPHIL NFR BLD AUTO: 2 % (ref 0.3–6.2)
ERYTHROCYTE [DISTWIDTH] IN BLOOD BY AUTOMATED COUNT: 12.6 % (ref 12.3–15.4)
FERRITIN SERPL-MCNC: 168.3 NG/ML (ref 13–150)
GFR SERPL CREATININE-BSD FRML MDRD: 49 ML/MIN/1.73
GLOBULIN UR ELPH-MCNC: 3.1 GM/DL
GLUCOSE BLD-MCNC: 183 MG/DL (ref 65–99)
HCT VFR BLD AUTO: 35.7 % (ref 34–46.6)
HGB BLD-MCNC: 11.2 G/DL (ref 12–15.9)
IMM GRANULOCYTES # BLD AUTO: 0.02 10*3/MM3 (ref 0–0.05)
IMM GRANULOCYTES NFR BLD AUTO: 0.4 % (ref 0–0.5)
IRON 24H UR-MRATE: 67 MCG/DL (ref 37–145)
IRON SATN MFR SERPL: 17 % (ref 20–50)
LYMPHOCYTES # BLD AUTO: 1.16 10*3/MM3 (ref 0.7–3.1)
LYMPHOCYTES NFR BLD AUTO: 25.2 % (ref 19.6–45.3)
MCH RBC QN AUTO: 30.6 PG (ref 26.6–33)
MCHC RBC AUTO-ENTMCNC: 31.4 G/DL (ref 31.5–35.7)
MCV RBC AUTO: 97.5 FL (ref 79–97)
MONOCYTES # BLD AUTO: 0.43 10*3/MM3 (ref 0.1–0.9)
MONOCYTES NFR BLD AUTO: 9.3 % (ref 5–12)
NEUTROPHILS # BLD AUTO: 2.88 10*3/MM3 (ref 1.7–7)
NEUTROPHILS NFR BLD AUTO: 62.7 % (ref 42.7–76)
PLATELET # BLD AUTO: 154 10*3/MM3 (ref 140–450)
PMV BLD AUTO: 9.2 FL (ref 6–12)
POTASSIUM BLD-SCNC: 4.1 MMOL/L (ref 3.5–5.2)
PROT SERPL-MCNC: 7.4 G/DL (ref 6–8.5)
RBC # BLD AUTO: 3.66 10*6/MM3 (ref 3.77–5.28)
SODIUM BLD-SCNC: 143 MMOL/L (ref 136–145)
TIBC SERPL-MCNC: 383 MCG/DL (ref 298–536)
TRANSFERRIN SERPL-MCNC: 257 MG/DL (ref 200–360)
WBC NRBC COR # BLD: 4.6 10*3/MM3 (ref 3.4–10.8)

## 2019-12-12 PROCEDURE — 84466 ASSAY OF TRANSFERRIN: CPT | Performed by: NURSE PRACTITIONER

## 2019-12-12 PROCEDURE — 99214 OFFICE O/P EST MOD 30 MIN: CPT | Performed by: NURSE PRACTITIONER

## 2019-12-12 PROCEDURE — 83540 ASSAY OF IRON: CPT | Performed by: NURSE PRACTITIONER

## 2019-12-12 PROCEDURE — 82728 ASSAY OF FERRITIN: CPT | Performed by: NURSE PRACTITIONER

## 2019-12-12 PROCEDURE — 85025 COMPLETE CBC W/AUTO DIFF WBC: CPT | Performed by: NURSE PRACTITIONER

## 2019-12-12 PROCEDURE — 80053 COMPREHEN METABOLIC PANEL: CPT | Performed by: NURSE PRACTITIONER

## 2019-12-12 PROCEDURE — 36415 COLL VENOUS BLD VENIPUNCTURE: CPT | Performed by: NURSE PRACTITIONER

## 2020-02-10 ENCOUNTER — CLINICAL SUPPORT (OUTPATIENT)
Dept: ONCOLOGY | Facility: CLINIC | Age: 65
End: 2020-02-10

## 2020-02-10 ENCOUNTER — LAB (OUTPATIENT)
Dept: ONCOLOGY | Facility: CLINIC | Age: 65
End: 2020-02-10

## 2020-02-10 VITALS
OXYGEN SATURATION: 97 % | HEART RATE: 68 BPM | RESPIRATION RATE: 16 BRPM | SYSTOLIC BLOOD PRESSURE: 152 MMHG | TEMPERATURE: 97.9 F | DIASTOLIC BLOOD PRESSURE: 60 MMHG

## 2020-02-10 DIAGNOSIS — N18.30 STAGE 3 CHRONIC KIDNEY DISEASE (HCC): ICD-10-CM

## 2020-02-10 DIAGNOSIS — D63.1 ANEMIA IN STAGE 3 CHRONIC KIDNEY DISEASE (HCC): ICD-10-CM

## 2020-02-10 DIAGNOSIS — N18.30 ANEMIA IN STAGE 3 CHRONIC KIDNEY DISEASE (HCC): ICD-10-CM

## 2020-02-10 LAB
ALBUMIN SERPL-MCNC: 4.6 G/DL (ref 3.5–5.2)
ALBUMIN/GLOB SERPL: 1.3 G/DL
ALP SERPL-CCNC: 130 U/L (ref 39–117)
ALT SERPL W P-5'-P-CCNC: 20 U/L (ref 1–33)
ANION GAP SERPL CALCULATED.3IONS-SCNC: 13 MMOL/L (ref 5–15)
AST SERPL-CCNC: 20 U/L (ref 1–32)
BASOPHILS # BLD AUTO: 0.02 10*3/MM3 (ref 0–0.2)
BASOPHILS NFR BLD AUTO: 0.4 % (ref 0–1.5)
BILIRUB SERPL-MCNC: 0.7 MG/DL (ref 0.2–1.2)
BUN BLD-MCNC: 27 MG/DL (ref 8–23)
BUN/CREAT SERPL: 22 (ref 7–25)
CALCIUM SPEC-SCNC: 9.9 MG/DL (ref 8.6–10.5)
CHLORIDE SERPL-SCNC: 100 MMOL/L (ref 98–107)
CO2 SERPL-SCNC: 28 MMOL/L (ref 22–29)
CREAT BLD-MCNC: 1.23 MG/DL (ref 0.57–1)
DEPRECATED RDW RBC AUTO: 44.2 FL (ref 37–54)
EOSINOPHIL # BLD AUTO: 0.04 10*3/MM3 (ref 0–0.4)
EOSINOPHIL NFR BLD AUTO: 0.7 % (ref 0.3–6.2)
ERYTHROCYTE [DISTWIDTH] IN BLOOD BY AUTOMATED COUNT: 12.3 % (ref 12.3–15.4)
FERRITIN SERPL-MCNC: 214.4 NG/ML (ref 13–150)
GFR SERPL CREATININE-BSD FRML MDRD: 44 ML/MIN/1.73
GLOBULIN UR ELPH-MCNC: 3.6 GM/DL
GLUCOSE BLD-MCNC: 178 MG/DL (ref 65–99)
HCT VFR BLD AUTO: 39.6 % (ref 34–46.6)
HGB BLD-MCNC: 12.4 G/DL (ref 12–15.9)
IMM GRANULOCYTES # BLD AUTO: 0.03 10*3/MM3 (ref 0–0.05)
IMM GRANULOCYTES NFR BLD AUTO: 0.6 % (ref 0–0.5)
IRON 24H UR-MRATE: 69 MCG/DL (ref 37–145)
IRON SATN MFR SERPL: 17 % (ref 20–50)
LYMPHOCYTES # BLD AUTO: 1.24 10*3/MM3 (ref 0.7–3.1)
LYMPHOCYTES NFR BLD AUTO: 23.2 % (ref 19.6–45.3)
MCH RBC QN AUTO: 30.2 PG (ref 26.6–33)
MCHC RBC AUTO-ENTMCNC: 31.3 G/DL (ref 31.5–35.7)
MCV RBC AUTO: 96.6 FL (ref 79–97)
MONOCYTES # BLD AUTO: 0.36 10*3/MM3 (ref 0.1–0.9)
MONOCYTES NFR BLD AUTO: 6.7 % (ref 5–12)
NEUTROPHILS # BLD AUTO: 3.66 10*3/MM3 (ref 1.7–7)
NEUTROPHILS NFR BLD AUTO: 68.4 % (ref 42.7–76)
PLATELET # BLD AUTO: 179 10*3/MM3 (ref 140–450)
PMV BLD AUTO: 9.8 FL (ref 6–12)
POTASSIUM BLD-SCNC: 4.2 MMOL/L (ref 3.5–5.2)
PROT SERPL-MCNC: 8.2 G/DL (ref 6–8.5)
RBC # BLD AUTO: 4.1 10*6/MM3 (ref 3.77–5.28)
SODIUM BLD-SCNC: 141 MMOL/L (ref 136–145)
TIBC SERPL-MCNC: 413 MCG/DL (ref 298–536)
TRANSFERRIN SERPL-MCNC: 277 MG/DL (ref 200–360)
WBC NRBC COR # BLD: 5.35 10*3/MM3 (ref 3.4–10.8)

## 2020-02-10 PROCEDURE — 84466 ASSAY OF TRANSFERRIN: CPT | Performed by: NURSE PRACTITIONER

## 2020-02-10 PROCEDURE — 80053 COMPREHEN METABOLIC PANEL: CPT | Performed by: NURSE PRACTITIONER

## 2020-02-10 PROCEDURE — 82728 ASSAY OF FERRITIN: CPT | Performed by: NURSE PRACTITIONER

## 2020-02-10 PROCEDURE — 85025 COMPLETE CBC W/AUTO DIFF WBC: CPT | Performed by: NURSE PRACTITIONER

## 2020-02-10 PROCEDURE — 83540 ASSAY OF IRON: CPT | Performed by: NURSE PRACTITIONER

## 2020-02-10 PROCEDURE — 99211 OFF/OP EST MAY X REQ PHY/QHP: CPT | Performed by: INTERNAL MEDICINE

## 2020-02-10 PROCEDURE — 82607 VITAMIN B-12: CPT | Performed by: NURSE PRACTITIONER

## 2020-02-10 PROCEDURE — 82746 ASSAY OF FOLIC ACID SERUM: CPT | Performed by: NURSE PRACTITIONER

## 2020-02-10 PROCEDURE — 36415 COLL VENOUS BLD VENIPUNCTURE: CPT | Performed by: NURSE PRACTITIONER

## 2020-02-10 NOTE — PROGRESS NOTES
Patient here for lab evaluation to possibly initiate Retacrit due to stage III chronic kidney disease.  Patient states that she is feeling good.  No c/o voiced.  Skin w/d to touch.  Color wnl.  Eating and drinking well. States that she is having some trouble with her blood sugars-states that she is seeing her PCP for this.  Parameters set to initiate Retacrit 40,000 units for Hgb <10 and Hct <30.  Reviewed labs with patient, Hgb 12.4 and Hct 39.6-patient does not meet guidelines to initiate Retacrit at this time.  Will return in 6 weeks for same.  Instructed to call with any problems.  Patient v/u.

## 2020-02-11 LAB
FOLATE SERPL-MCNC: 13.7 NG/ML (ref 4.78–24.2)
VIT B12 BLD-MCNC: 401 PG/ML (ref 211–946)

## 2020-07-16 ENCOUNTER — OFFICE VISIT (OUTPATIENT)
Dept: ONCOLOGY | Facility: CLINIC | Age: 65
End: 2020-07-16

## 2020-07-16 ENCOUNTER — LAB (OUTPATIENT)
Dept: ONCOLOGY | Facility: CLINIC | Age: 65
End: 2020-07-16

## 2020-07-16 VITALS
OXYGEN SATURATION: 97 % | HEIGHT: 63 IN | TEMPERATURE: 97.3 F | HEART RATE: 72 BPM | WEIGHT: 293 LBS | DIASTOLIC BLOOD PRESSURE: 86 MMHG | SYSTOLIC BLOOD PRESSURE: 142 MMHG | BODY MASS INDEX: 51.91 KG/M2 | RESPIRATION RATE: 16 BRPM

## 2020-07-16 DIAGNOSIS — N18.30 ANEMIA IN STAGE 3 CHRONIC KIDNEY DISEASE (HCC): ICD-10-CM

## 2020-07-16 DIAGNOSIS — D63.1 ANEMIA IN STAGE 3 CHRONIC KIDNEY DISEASE (HCC): ICD-10-CM

## 2020-07-16 DIAGNOSIS — D73.1 HYPERSPLENISM: ICD-10-CM

## 2020-07-16 DIAGNOSIS — N18.30 ANEMIA IN STAGE 3 CHRONIC KIDNEY DISEASE (HCC): Primary | ICD-10-CM

## 2020-07-16 DIAGNOSIS — D63.1 ANEMIA IN STAGE 3 CHRONIC KIDNEY DISEASE (HCC): Primary | ICD-10-CM

## 2020-07-16 LAB
ALBUMIN SERPL-MCNC: 4.4 G/DL (ref 3.5–5.2)
ALBUMIN/GLOB SERPL: 1.5 G/DL
ALP SERPL-CCNC: 117 U/L (ref 39–117)
ALT SERPL W P-5'-P-CCNC: 19 U/L (ref 1–33)
ANION GAP SERPL CALCULATED.3IONS-SCNC: 13 MMOL/L (ref 5–15)
AST SERPL-CCNC: 20 U/L (ref 1–32)
BASOPHILS # BLD AUTO: 0.01 10*3/MM3 (ref 0–0.2)
BASOPHILS NFR BLD AUTO: 0.2 % (ref 0–1.5)
BILIRUB SERPL-MCNC: 0.7 MG/DL (ref 0–1.2)
BUN SERPL-MCNC: 21 MG/DL (ref 8–23)
BUN/CREAT SERPL: 20 (ref 7–25)
CALCIUM SPEC-SCNC: 9.7 MG/DL (ref 8.6–10.5)
CHLORIDE SERPL-SCNC: 102 MMOL/L (ref 98–107)
CO2 SERPL-SCNC: 28 MMOL/L (ref 22–29)
CREAT SERPL-MCNC: 1.05 MG/DL (ref 0.57–1)
DEPRECATED RDW RBC AUTO: 44.3 FL (ref 37–54)
EOSINOPHIL # BLD AUTO: 0.04 10*3/MM3 (ref 0–0.4)
EOSINOPHIL NFR BLD AUTO: 0.8 % (ref 0.3–6.2)
ERYTHROCYTE [DISTWIDTH] IN BLOOD BY AUTOMATED COUNT: 12.3 % (ref 12.3–15.4)
FERRITIN SERPL-MCNC: 178.2 NG/ML (ref 13–150)
GFR SERPL CREATININE-BSD FRML MDRD: 53 ML/MIN/1.73
GLOBULIN UR ELPH-MCNC: 2.9 GM/DL
GLUCOSE SERPL-MCNC: 143 MG/DL (ref 65–99)
HCT VFR BLD AUTO: 36 % (ref 34–46.6)
HGB BLD-MCNC: 11.2 G/DL (ref 12–15.9)
IMM GRANULOCYTES # BLD AUTO: 0.01 10*3/MM3 (ref 0–0.05)
IMM GRANULOCYTES NFR BLD AUTO: 0.2 % (ref 0–0.5)
IRON 24H UR-MRATE: 72 MCG/DL (ref 37–145)
IRON SATN MFR SERPL: 18 % (ref 20–50)
LYMPHOCYTES # BLD AUTO: 1.07 10*3/MM3 (ref 0.7–3.1)
LYMPHOCYTES NFR BLD AUTO: 22.4 % (ref 19.6–45.3)
MCH RBC QN AUTO: 30.6 PG (ref 26.6–33)
MCHC RBC AUTO-ENTMCNC: 31.1 G/DL (ref 31.5–35.7)
MCV RBC AUTO: 98.4 FL (ref 79–97)
MONOCYTES # BLD AUTO: 0.4 10*3/MM3 (ref 0.1–0.9)
MONOCYTES NFR BLD AUTO: 8.4 % (ref 5–12)
NEUTROPHILS NFR BLD AUTO: 3.24 10*3/MM3 (ref 1.7–7)
NEUTROPHILS NFR BLD AUTO: 68 % (ref 42.7–76)
PLATELET # BLD AUTO: 149 10*3/MM3 (ref 140–450)
PMV BLD AUTO: 9.4 FL (ref 6–12)
POTASSIUM SERPL-SCNC: 4.2 MMOL/L (ref 3.5–5.2)
PROT SERPL-MCNC: 7.3 G/DL (ref 6–8.5)
RBC # BLD AUTO: 3.66 10*6/MM3 (ref 3.77–5.28)
SODIUM SERPL-SCNC: 143 MMOL/L (ref 136–145)
TIBC SERPL-MCNC: 408 MCG/DL (ref 298–536)
TRANSFERRIN SERPL-MCNC: 274 MG/DL (ref 200–360)
WBC # BLD AUTO: 4.77 10*3/MM3 (ref 3.4–10.8)

## 2020-07-16 PROCEDURE — 83540 ASSAY OF IRON: CPT | Performed by: NURSE PRACTITIONER

## 2020-07-16 PROCEDURE — 80053 COMPREHEN METABOLIC PANEL: CPT | Performed by: NURSE PRACTITIONER

## 2020-07-16 PROCEDURE — 84466 ASSAY OF TRANSFERRIN: CPT | Performed by: NURSE PRACTITIONER

## 2020-07-16 PROCEDURE — 82728 ASSAY OF FERRITIN: CPT | Performed by: NURSE PRACTITIONER

## 2020-07-16 PROCEDURE — 82746 ASSAY OF FOLIC ACID SERUM: CPT | Performed by: NURSE PRACTITIONER

## 2020-07-16 PROCEDURE — 99213 OFFICE O/P EST LOW 20 MIN: CPT | Performed by: NURSE PRACTITIONER

## 2020-07-16 PROCEDURE — 85025 COMPLETE CBC W/AUTO DIFF WBC: CPT | Performed by: NURSE PRACTITIONER

## 2020-07-16 PROCEDURE — 82607 VITAMIN B-12: CPT | Performed by: NURSE PRACTITIONER

## 2020-07-16 NOTE — PROGRESS NOTES
[facility]  HEMATOLOGY & ONCOLOGY    [facility] ONC [facility] ONCOLOGY  [address] [place] [address] [address]  [facility] [id number]  [phone]    Patient Name: Mariia Parker  Encounter Date: 12/12/2019  YOB: 1955  Patient Number: [id number]    Chief Complaint   Patient presents with   • Anemia     here for f/u       REASON FOR VISIT: Ms. 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 She is also seen for anemia from iron deficiency. She was intolerant to oral iron therapy and was switched to Nulecit in August 2019. She is seen alone. History is obtained from the patient. History is considered to be reliable.     She presents today feeling well. She has no complaints. She has a large abdominal hernia that bothers her intermittently but no significant pain or other problems.  She has had no GI upset, fever or shortness of breath.      Labs today show a stable hgb of 11.2, MCV 98.4, plts of 149,000 and wbc of 4.77.  Ferritin is at 178.20, iron saturation of 18%. CMP shows an improved GFR of 53ml/min and a slightly elevated glucose of 143.     DIAGNOSTIC ABNORMALITIES:   1. Endoscopy 03/2016: Mild erosive gastritis.  2. CBC 05/12/2016: WBC 5.4, hemoglobin 11.1, hematocrit 35.3, MCV 94.1, platelet count 193,000 with ANC 3.06.  3. Labs 09/13/2016: WBC 4.4, hemoglobin 8.7, hematocrit 28.1, MCV 93, platelets 119,000, ANC 2.73. Potassium 5.2, chloride 112, BUN 55, creatinine 4.67, AST 53, ALT 72, GFR 10. Serum iron 95, TIBC 365, TSH 5.81 (elevated). CRP less than 2.9.  4. PATHOLOGY: Comprehensive report 10/03/2016. Peripheral blood: Normochromic anemia with mild anisopoikilocytosis. Mild thrombocytopenia. Flow Impression: Peripheral blood: No increase in myeloid or lymphoid blasts identified. No immunophenotypically abnormal B- or T-cell population  identified.   5. Liver/spleen scan, 01/20/2017, [facility]: Hepatomegaly. Borderline enlarged spleen.     PREVIOUS INTERVENTIONS:   1. 2 units PRBCs 10/13/2016 at [facility].  2. Procrit 40,000 units subcutaneously 10/13/2016 through present at [facility]. Details not available.  3. Ferrous sulfate 325 mg 07/20/2017 through 01/22/2018. Resume 05/21/2018 through 09/17/2018. Resumed 11/21/2018 through 12/06/2018. Resume 05/13/2019.  4. Nulecit 125mg weekly x3 8/13/19, 8/20/19, 8/27/19      PAST MEDICAL HISTORY:  ALLERGIES:  No Known Allergies    CURRENT MEDICATIONS:  Outpatient Encounter Medications as of 7/16/2020   Medication Sig Dispense Refill   • acetaminophen (TYLENOL 8 HOUR ARTHRITIS PAIN) 650 MG 8 hr tablet Take 650 mg by mouth Every 8 (Eight) Hours As Needed for Mild Pain .     • amLODIPine (NORVASC) 10 MG tablet Take 10 mg by mouth Daily.     • atorvastatin (LIPITOR) 40 MG tablet Take 40 mg by mouth Daily.     • carvedilol (COREG) 25 MG tablet Take 25 mg by mouth 2 (Two) Times a Day With Meals.     • colestipol (COLESTID) 1 g tablet Take 1 g by mouth 2 (Two) Times a Day.     • FLUoxetine (PROzac) 20 MG capsule Take 40 mg by mouth Daily.     • furosemide (LASIX) 20 MG tablet Take 20 mg by mouth Daily. 1 or 2 tablets daily     • gabapentin (NEURONTIN) 300 MG capsule Take 900 mg by mouth 2 (Two) Times a Day.     • HYDROcodone-acetaminophen (NORCO) 5-325 MG per tablet Take 1 tablet by mouth Every 6 (Six) Hours As Needed for Moderate Pain . 12 tablet 0   • linagliptin (TRADJENTA) 5 MG tablet tablet Take 5 mg by mouth Daily.     • Multiple Vitamins-Minerals (MULTIVITAMIN ADULT PO) Take  by mouth.     • pantoprazole (PROTONIX) 40 MG EC tablet Take 40 mg by mouth Daily.     • VITAMIN D, ERGOCALCIFEROL, PO Take  by mouth Daily.     • Wheat Dextrin (BENEFIBER PO) Take  by mouth As Needed.     • XARELTO 20 MG tablet 20 mg Daily With Dinner.       No  facility-administered encounter medications on file as of 7/16/2020.      ADULT ILLNESSES:  Patient Active Problem List   Diagnosis Code   • Body mass index (BMI) of 50-59.9 in adult (CMS/[facility]) Z68.43   • Stage 3 chronic kidney disease (CMS/HCC) N18.3   • Pancytopenia (CMS/HCC) D61.818   • Hypersplenism D73.1   • Hypertension I10   • Non-alcoholic fatty liver disease K76.0   • Anemia in stage 3 chronic kidney disease (CMS/HCC) N18.3, D63.1     SURGERIES:  Past Surgical History:   Procedure Laterality Date   • CARPAL TUNNEL RELEASE Right    • ENDOSCOPY AND COLONOSCOPY  03/2016   • FOREIGN BODY REMOVAL Right     leg   • HYSTERECTOMY      open; total   • LAPAROSCOPIC CHOLECYSTECTOMY     • SMALL INTESTINE SURGERY  06/2016   • UMBILICAL HERNIA REPAIR      not sure if has mesh in there or not (x4 surgeries)      HEALTH MAINTENANCE ITEMS:  Health Maintenance Due   Topic Date Due   • URINE MICROALBUMIN  1955   • TDAP/TD VACCINES (1 - Tdap) 05/16/1967   • PNEUMOCOCCAL VACCINE (19-64 MEDIUM RISK) (1 of 1 - PPSV23) 05/16/1975   • ZOSTER VACCINE (1 of 2) 05/16/2006   • HEPATITIS C SCREENING  05/17/2018   • MEDICARE ANNUAL WELLNESS  05/17/2018   • DIABETIC FOOT EXAM  05/17/2018   • PAP SMEAR  05/17/2018   • LIPID PANEL  05/17/2018   • DIABETIC EYE EXAM  05/17/2018   • COLONOSCOPY  05/17/2018   • MAMMOGRAM  10/26/2018   • HEMOGLOBIN A1C  12/12/2019       <no information>  Last Completed Colonoscopy       Status Date      COLONOSCOPY No completions recorded          There is no immunization history on file for this patient.  Last Completed Mammogram       Status Date      MAMMOGRAM Done 10/26/2016 SCANNED - MAMMO     negative        FAMILY HISTORY:  Family History   Problem Relation Age of Onset   • Cancer Mother    • Hypertension Mother    • Heart disease Mother    • Breast cancer Mother    • Dementia Mother    • Parkinsonism Mother    • No Known Problems Father    • Hypertension Brother    • Other Maternal Grandmother        "  brain tumor   • No Known Problems Maternal Grandfather    • No Known Problems Paternal Grandmother    • No Known Problems Paternal Grandfather    • No Known Problems Brother      SOCIAL HISTORY:  Social History     Socioeconomic History   • Marital status:      Spouse name: Not on file   • Number of children: Not on file   • Years of education: Not on file   • Highest education level: Not on file   Tobacco Use   • Smoking status: Never Smoker   • Smokeless tobacco: Never Used   Substance and Sexual Activity   • Alcohol use: No   • Drug use: No   • Sexual activity: Defer     Birth control/protection: Surgical       REVIEW OF SYSTEMS:  Constitutional:   The patient's appetite is good. She has gained 28 pounds since her last visit. \"Too many.\" Her energy is fair. She has no fevers, chills, or drenching night sweats. Her sleep habits are good.  Ear/Nose/Throat/Mouth:   She reports no ear discharge, sore throat, sinus congestion, nosebleeds, or gum bleeding. She reports no headaches. She has no hoarseness, change in voice quality, or hemoptysis.   Ocular:   She reports no eye pain, change in visual acuity, double vision, or blurry vision.  Respiratory:   She reports shortness of breath with mild exertion. She has no cough, phlegm production, or chest wall pain.  Cardiovascular:   She reports no exertional chest pain, chest pressure, or chest heaviness. She has no claudication. She reports no palpitations or orthostasis.  Gastrointestinal:   She reports no dysphagia, nausea, vomiting, abdominal pain, change in bowel habits, or discoloration of the stool. She reports no rectal bleeding. She reports no constipation or diarrhea.   Genitourinary:   She reports no urinary burning, hesitancy, frequency, or discoloration. She reports no difficulty controlling her bladder. She reports no need to urinate frequently through the night.  Musculoskeletal:   She reports pain from her back to her legs. \"I have sciatica and " "arthritis. My back is deteriorating more.\" She reports no unexplained arthralgias, myalgias, or leg cramping.  Extremities:   She reports trouble with fluid retention and lower extremity swelling. \"I take water pills.\"  Endocrine:   She reports no problems with excess thirst, excessive urination, vasomotor instability, or fatigue.  Heme/Lymphatic:   She reports no unexplained bleeding, bruising, petechial rashes, or swollen glands.  Skin:   She reports no itching, rashes, or sore.  Neuro:   She reports no loss of consciousness, seizures, lightheadedness, or fainting. She reports no weakness of her face, arms, or legs. She has no difficulty with speech. She has no tremors.  Psych:   She is satisfied with life. She has no depression. She reports no mood swings. She reports no anxiety.    /86   Pulse 72   Temp 97.3 °F (36.3 °C) (Temporal)   Resp 16   Ht 160 cm (63\")   Wt 136 kg (300 lb)   SpO2 97%   Breastfeeding No   BMI 53.14 kg/m²  Body surface area is 2.3 meters squared.  Pain Score    07/16/20 1004   PainSc: 0-No pain       Physical Exam:  General:   She is a well-developed, well-nourished, and well-kept female who is comfortable. She arrived in the exam room ambulatory with a cane. She appears to be her stated age. Her skin color is pale.  Head/Neck:   The patient is anicteric and atraumatic. The neck is supple without evidence of jugular venous distention or cervical adenopathy.   Eyes:   The pupils are equal and round. There is no scleral jaundice or erythema.   Chest:   The respiratory efforts are normal and unhindered. The chest is clear to auscultation and percussion. There are no wheezes, rales, or asymmetry of breath sounds.  Cardiovascular:   The patient has a regular cardiac rate and rhythm without murmur.   Abdomen:   The belly is soft and globose. Not useful for exam. There is no rebound or guarding. There is no tenderness. Bowel sounds are normoactive.  Extremities:   There is no " evidence of cyanosis or clubbing. Chronic bilateral ankle edema.  Rheumatologic:   There is no overt evidence of rheumatoid deformities of the hands. Her gait is normal with her cane.  Cutaneous:   There are no overt rashes, purpura, petechiae, or ecchymosis.  Lymphatics:   There is no evidence of adenopathy in the cervical, supraclavicular, and axillary areas.   Neurologic:   The patient is alert, oriented, cooperative, and pleasant. She is appropriately conversant. She ambulated into the exam room without assistance and transferred from chair to exam table without assistance. There is no overt dysfunction of the motor, sensory, cerebellar systems.  Psych:   Mood and affect are appropriate for circumstance. Eye contact is appropriate. Normal thought content and judgement.    Mariia COX Keith reports a pain score of 0.  Patient's Body mass index is 53.14 kg/m². BMI is above normal parameters. Recommendations include: nutrition counseling.    LABS    Lab Results - Last 18 Months   Lab Units 07/16/20  0934 02/10/20  1023 12/12/19  0844 11/14/19  1037 10/24/19  0929 09/19/19  0906  06/12/19  1700   HEMOGLOBIN g/dL 11.2* 12.4 11.2* 11.8* 11.2* 11.3*   < > 11.7*   HEMATOCRIT % 36.0 39.6 35.7 38.9 36.4 36.5   < > 37.1   MCV fL 98.4* 96.6 97.5* 98.5* 97.3* 96.3   < > 93.7   WBC 10*3/mm3 4.77 5.35 4.60 5.08 4.25 5.24   < > 4.66*   RDW % 12.3 12.3 12.6 12.8 13.3 12.8   < > 13.3   MPV fL 9.4 9.8 9.2 9.6 9.7 9.7   < > 11.6   PLATELETS 10*3/mm3 149 179 154 185 188 161   < > 171   IMM GRAN % % 0.2 0.6* 0.4 0.8* 0.5 0.6*   < > 0.9   NEUTROS ABS 10*3/mm3 3.24 3.66 2.88 2.95 2.67 3.64   < > 3.02   LYMPHS ABS 10*3/mm3 1.07 1.24 1.16 1.52 1.03 1.07   < > 1.06   MONOS ABS 10*3/mm3 0.40 0.36 0.43 0.45 0.48 0.43   < > 0.45   EOS ABS 10*3/mm3 0.04 0.04 0.09 0.10 0.02 0.05   < > 0.05   BASOS ABS 10*3/mm3 0.01 0.02 0.02 0.02 0.03 0.02   < > 0.04   IMMATURE GRANS (ABS) 10*3/mm3 0.01 0.03 0.02 0.04 0.02 0.03   < > 0.04   NRBC /100 WBC  --    --   --   --   --   --   --  0.0    < > = values in this interval not displayed.       Lab Results - Last 18 Months   Lab Units 07/16/20  0934 02/10/20  1023 12/12/19  0844 09/10/19  1006 08/07/19 1700 07/08/19  1630   GLUCOSE mg/dL 143* 178* 183* 152* 132* 184*   SODIUM mmol/L 143 141 143 144 143 142   POTASSIUM mmol/L 4.2 4.2 4.1 4.0 4.1 4.4   CO2 mmol/L 28.0 28.0 31.0* 29.0 27.0 28.0   CHLORIDE mmol/L 102 100 100 104 107 103   ANION GAP mmol/L 13.0 13.0 12.0 11.0 9.0 11.0   CREATININE mg/dL 1.05* 1.23* 1.12* 1.13* 1.22 1.01   BUN mg/dL 21 27* 23 18 15 20   BUN / CREAT RATIO  20.0 22.0 20.5 15.9 12.3 19.8   CALCIUM mg/dL 9.7 9.9 9.7 9.6 9.0 9.6   EGFR IF NONAFRICN AM mL/min/1.73 53* 44* 49* 49* 45* 55*   ALK PHOS U/L 117 130* 134* 111 124* 110   TOTAL PROTEIN g/dL 7.3 8.2 7.4 7.3 6.7 7.0   ALT (SGPT) U/L 19 20 29 19 29 29   AST (SGOT) U/L 20 20 23 20 29 24   BILIRUBIN mg/dL 0.7 0.7 0.6 0.7 0.5 0.7   ALBUMIN g/dL 4.40 4.60 4.30 4.20 3.90 4.30   GLOBULIN gm/dL 2.9 3.6 3.1 3.1 2.8 2.7     Lab Results - Last 18 Months   Lab Units 07/16/20 0934 02/10/20  1023 12/12/19 0844 09/19/19  1009 09/10/19  1006 08/07/19  1700 07/08/19  1630   IRON mcg/dL 72 69 67  --  68 51 84   TIBC mcg/dL 408 413 383  --  384 322 336   IRON SATURATION % 18* 17* 17*  --  18* 16* 25   FERRITIN ng/mL 178.20* 214.40* 168.30*  --  264.10* 96.90 80.00   FOLATE ng/mL >20.00 13.70  --  19.50  --   --   --      ASSESSMENT:   1. Pancytopenia, likely hypersplenism from non-alcoholic fatty liver disease.   2. Normocytic anemia with components of intermittent iron deficiency and chronic kidney disease Stage III, GFR 53 mL/minute on 7/16/2020  3. Iron deficiency s/p Nulecit in August 2019. Ferritin normal at 178 and saturation slightly low at 18%. Stable hgb, therefore no need for iron at this time.    4. Obesity  5. Chronic kidney disease stage, GFR 53 mL/minute on 7/16/2020  6. Acute renal failure, 09/13/2016. GFR 10 mL/minute 09/13/2016- acute on  chronic resolved at present.   7. Non-alcoholic fatty liver disease.   8. Transaminitis from non-alcoholic fatty liver disease - improved and LFTs normal 7/16/2020    PLAN:   1.  Re: Interval hemoglobin 11.2 gm on 7/16/2020 - stable  2.  Re: Heme status. WBC and plts are remaining normal.   3.  Re: Pre-office CMP. GFR 53 ml/minute- improved.   4.  Re: Pre-office ferritin, TIBC, % saturation and iron. 18% saturation and ferritin 178. -stable.   5.  Re: Iron replacement not needed at present  6. Procrit 40,000 units subcutaneously weekly if hemoglobin less than 10 and hematocrit less than 30 to target a hemoglobin of 11 and hematocrit of 33% at [facility]. Move CBC to weekly if she starts Procrit.   7. Transfuse 2 units PRBCs if hemoglobin below 8 gm. Premed Tylenol 500 mg p.o. and Benadryl 25 mg IV. Lasix 20 mg IVP after each unit of PRBC. Move CBC every week if she starts Procrit.   8. CBC with differential every 8 weeks.   9. Ferritin, TIBC, % saturation, iron, and CMP every 8 weeks.   10. Continue current medicines.   11. Continue ongoing management per primary care physician and other specialists.   12. Plan of care discussed with patient. Understanding expressed. Patient agreeable to proceed.   13. Return to [place] office in 4 months.    All activities occurring within this visit are in accordance with the plan of care as set forth by Dr. Dennis Lewis.    I spent 22 total minutes, face-to-face, caring for Mariia today.  Greater than 50% of this time involved counseling and/or coordination of care as documented within this note regarding the patient's illness(es), pros and cons of various treatment options, instructions and/or risk reduction.      JOAQUIN Gordon   07/16/2020  2:22 PM    cc: JOAQUIN Cosby MD Elias Pitaras, MD Dale Jones, MD

## 2020-07-17 LAB
FOLATE SERPL-MCNC: >20 NG/ML (ref 4.78–24.2)
VIT B12 BLD-MCNC: 352 PG/ML (ref 211–946)

## 2020-11-19 ENCOUNTER — OFFICE VISIT (OUTPATIENT)
Dept: ONCOLOGY | Facility: CLINIC | Age: 65
End: 2020-11-19

## 2020-11-19 ENCOUNTER — LAB (OUTPATIENT)
Dept: ONCOLOGY | Facility: CLINIC | Age: 65
End: 2020-11-19

## 2020-11-19 VITALS
BODY MASS INDEX: 67.81 KG/M2 | DIASTOLIC BLOOD PRESSURE: 86 MMHG | HEART RATE: 73 BPM | TEMPERATURE: 97.5 F | HEIGHT: 55 IN | OXYGEN SATURATION: 93 % | WEIGHT: 293 LBS | RESPIRATION RATE: 20 BRPM | SYSTOLIC BLOOD PRESSURE: 128 MMHG

## 2020-11-19 DIAGNOSIS — D73.1 HYPERSPLENISM: ICD-10-CM

## 2020-11-19 DIAGNOSIS — N18.31 STAGE 3A CHRONIC KIDNEY DISEASE (HCC): ICD-10-CM

## 2020-11-19 DIAGNOSIS — D50.9 IRON DEFICIENCY ANEMIA, UNSPECIFIED IRON DEFICIENCY ANEMIA TYPE: ICD-10-CM

## 2020-11-19 DIAGNOSIS — N18.31 ANEMIA IN STAGE 3A CHRONIC KIDNEY DISEASE (HCC): Primary | ICD-10-CM

## 2020-11-19 DIAGNOSIS — D63.1 ANEMIA IN STAGE 3A CHRONIC KIDNEY DISEASE (HCC): Primary | ICD-10-CM

## 2020-11-19 DIAGNOSIS — N18.31 ANEMIA IN STAGE 3A CHRONIC KIDNEY DISEASE (HCC): ICD-10-CM

## 2020-11-19 DIAGNOSIS — H91.93 HEARING PROBLEM, BILATERAL: ICD-10-CM

## 2020-11-19 DIAGNOSIS — D63.1 ANEMIA IN STAGE 3A CHRONIC KIDNEY DISEASE (HCC): ICD-10-CM

## 2020-11-19 DIAGNOSIS — K76.0 NON-ALCOHOLIC FATTY LIVER DISEASE: ICD-10-CM

## 2020-11-19 LAB
ALBUMIN SERPL-MCNC: 4.3 G/DL (ref 3.5–5.2)
ALBUMIN/GLOB SERPL: 1.4 G/DL
ALP SERPL-CCNC: 145 U/L (ref 39–117)
ALT SERPL W P-5'-P-CCNC: 20 U/L (ref 1–33)
ANION GAP SERPL CALCULATED.3IONS-SCNC: 9 MMOL/L (ref 5–15)
AST SERPL-CCNC: 20 U/L (ref 1–32)
BASOPHILS # BLD AUTO: 0.02 10*3/MM3 (ref 0–0.2)
BASOPHILS NFR BLD AUTO: 0.4 % (ref 0–1.5)
BILIRUB SERPL-MCNC: 0.6 MG/DL (ref 0–1.2)
BUN SERPL-MCNC: 33 MG/DL (ref 8–23)
BUN/CREAT SERPL: 30.6 (ref 7–25)
CALCIUM SPEC-SCNC: 9.5 MG/DL (ref 8.6–10.5)
CHLORIDE SERPL-SCNC: 106 MMOL/L (ref 98–107)
CO2 SERPL-SCNC: 26 MMOL/L (ref 22–29)
CREAT SERPL-MCNC: 1.08 MG/DL (ref 0.57–1)
DEPRECATED RDW RBC AUTO: 43 FL (ref 37–54)
EOSINOPHIL # BLD AUTO: 0.18 10*3/MM3 (ref 0–0.4)
EOSINOPHIL NFR BLD AUTO: 3.6 % (ref 0.3–6.2)
ERYTHROCYTE [DISTWIDTH] IN BLOOD BY AUTOMATED COUNT: 12 % (ref 12.3–15.4)
FERRITIN SERPL-MCNC: 212.9 NG/ML (ref 13–150)
GFR SERPL CREATININE-BSD FRML MDRD: 51 ML/MIN/1.73
GLOBULIN UR ELPH-MCNC: 3.1 GM/DL
GLUCOSE SERPL-MCNC: 158 MG/DL (ref 65–99)
HCT VFR BLD AUTO: 36.7 % (ref 34–46.6)
HGB BLD-MCNC: 11.7 G/DL (ref 12–15.9)
IMM GRANULOCYTES # BLD AUTO: 0.01 10*3/MM3 (ref 0–0.05)
IMM GRANULOCYTES NFR BLD AUTO: 0.2 % (ref 0–0.5)
IRON 24H UR-MRATE: 60 MCG/DL (ref 37–145)
IRON SATN MFR SERPL: 14 % (ref 20–50)
LYMPHOCYTES # BLD AUTO: 1.18 10*3/MM3 (ref 0.7–3.1)
LYMPHOCYTES NFR BLD AUTO: 23.6 % (ref 19.6–45.3)
MCH RBC QN AUTO: 30.5 PG (ref 26.6–33)
MCHC RBC AUTO-ENTMCNC: 31.9 G/DL (ref 31.5–35.7)
MCV RBC AUTO: 95.8 FL (ref 79–97)
MONOCYTES # BLD AUTO: 0.5 10*3/MM3 (ref 0.1–0.9)
MONOCYTES NFR BLD AUTO: 10 % (ref 5–12)
NEUTROPHILS NFR BLD AUTO: 3.1 10*3/MM3 (ref 1.7–7)
NEUTROPHILS NFR BLD AUTO: 62.2 % (ref 42.7–76)
PLATELET # BLD AUTO: 162 10*3/MM3 (ref 140–450)
PMV BLD AUTO: 9.2 FL (ref 6–12)
POTASSIUM SERPL-SCNC: 4.4 MMOL/L (ref 3.5–5.2)
PROT SERPL-MCNC: 7.4 G/DL (ref 6–8.5)
RBC # BLD AUTO: 3.83 10*6/MM3 (ref 3.77–5.28)
SODIUM SERPL-SCNC: 141 MMOL/L (ref 136–145)
TIBC SERPL-MCNC: 438 MCG/DL (ref 298–536)
TRANSFERRIN SERPL-MCNC: 294 MG/DL (ref 200–360)
WBC # BLD AUTO: 4.99 10*3/MM3 (ref 3.4–10.8)

## 2020-11-19 PROCEDURE — 85025 COMPLETE CBC W/AUTO DIFF WBC: CPT | Performed by: NURSE PRACTITIONER

## 2020-11-19 PROCEDURE — 99213 OFFICE O/P EST LOW 20 MIN: CPT | Performed by: NURSE PRACTITIONER

## 2020-11-19 PROCEDURE — 82607 VITAMIN B-12: CPT | Performed by: NURSE PRACTITIONER

## 2020-11-19 PROCEDURE — 83540 ASSAY OF IRON: CPT | Performed by: NURSE PRACTITIONER

## 2020-11-19 PROCEDURE — 82746 ASSAY OF FOLIC ACID SERUM: CPT | Performed by: NURSE PRACTITIONER

## 2020-11-19 PROCEDURE — 84466 ASSAY OF TRANSFERRIN: CPT | Performed by: NURSE PRACTITIONER

## 2020-11-19 PROCEDURE — 82728 ASSAY OF FERRITIN: CPT | Performed by: NURSE PRACTITIONER

## 2020-11-19 PROCEDURE — 80053 COMPREHEN METABOLIC PANEL: CPT | Performed by: NURSE PRACTITIONER

## 2020-11-19 NOTE — PROGRESS NOTES
[facility]  HEMATOLOGY & ONCOLOGY    [facility] ONCOLOGY  [address] [place] [address] [address]  [facility] [id number]  [phone]    Patient Name: Mariia Parker  Encounter Date: 11/19/2020  YOB: 1955  Patient Number: [id number]    Chief Complaint   Patient presents with   • ANEMIA STAGE 3 CKD       REASON FOR VISIT: Ms. 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 She is also seen for anemia from iron deficiency. She was intolerant to oral iron therapy and was switched to Nulecit in August 2019. She is seen alone. History is obtained from the patient. History is considered to be reliable.     She presents today feeling well. She has no complaints. She has a large abdominal hernia that bothers her intermittently but no significant pain or other problems.  She has had no GI upset, fever or shortness of breath.      Labs today show a stable hgb of 11.7, MCV 95.8, plts of 162,000 and wbc of 4.99.  Ferritin was at 178.20, iron saturation of 18% in July 2020. CMP showed an improved GFR of 53ml/min at last appointment in July 2020.      DIAGNOSTIC ABNORMALITIES:   1. Endoscopy 03/2016: Mild erosive gastritis.  2. CBC 05/12/2016: WBC 5.4, hemoglobin 11.1, hematocrit 35.3, MCV 94.1, platelet count 193,000 with ANC 3.06.  3. Labs 09/13/2016: WBC 4.4, hemoglobin 8.7, hematocrit 28.1, MCV 93, platelets 119,000, ANC 2.73. Potassium 5.2, chloride 112, BUN 55, creatinine 4.67, AST 53, ALT 72, GFR 10. Serum iron 95, TIBC 365, TSH 5.81 (elevated). CRP less than 2.9.  4. PATHOLOGY: Comprehensive report 10/03/2016. Peripheral blood: Normochromic anemia with mild anisopoikilocytosis. Mild thrombocytopenia. Flow Impression: Peripheral blood: No increase in myeloid or lymphoid blasts identified. No immunophenotypically abnormal B- or T-cell population  identified.   5. Liver/spleen scan, 01/20/2017, [facility]: Hepatomegaly. Borderline enlarged spleen.     PREVIOUS INTERVENTIONS:   1. 2 units PRBCs 10/13/2016 at [facility].  2. Procrit 40,000 units subcutaneously 10/13/2016 through present at [facility]. Details not available.  3. Ferrous sulfate 325 mg 07/20/2017 through 01/22/2018. Resume 05/21/2018 through 09/17/2018. Resumed 11/21/2018 through 12/06/2018. Resume 05/13/2019.  4. Nulecit 125mg weekly x3 8/13/19, 8/20/19, 8/27/19      PAST MEDICAL HISTORY:  ALLERGIES:  No Known Allergies    CURRENT MEDICATIONS:  Outpatient Encounter Medications as of 11/19/2020   Medication Sig Dispense Refill   • acetaminophen (TYLENOL 8 HOUR ARTHRITIS PAIN) 650 MG 8 hr tablet Take 650 mg by mouth Every 8 (Eight) Hours As Needed for Mild Pain .     • amLODIPine (NORVASC) 10 MG tablet Take 10 mg by mouth Daily.     • atorvastatin (LIPITOR) 40 MG tablet Take 40 mg by mouth Daily.     • carvedilol (COREG) 25 MG tablet Take 25 mg by mouth 2 (Two) Times a Day With Meals.     • colestipol (COLESTID) 1 g tablet Take 1 g by mouth 2 (Two) Times a Day.     • FLUoxetine (PROzac) 20 MG capsule Take 40 mg by mouth Daily.     • furosemide (LASIX) 20 MG tablet Take 20 mg by mouth Daily. 1 or 2 tablets daily     • gabapentin (NEURONTIN) 300 MG capsule Take 900 mg by mouth 2 (Two) Times a Day.     • HYDROcodone-acetaminophen (NORCO) 5-325 MG per tablet Take 1 tablet by mouth Every 6 (Six) Hours As Needed for Moderate Pain . 12 tablet 0   • linagliptin (TRADJENTA) 5 MG tablet tablet Take 5 mg by mouth Daily.     • Multiple Vitamins-Minerals (MULTIVITAMIN ADULT PO) Take  by mouth.     • pantoprazole (PROTONIX) 40 MG EC tablet Take 40 mg by mouth Daily.     • VITAMIN D, ERGOCALCIFEROL, PO Take  by mouth Daily.     • Wheat Dextrin (BENEFIBER PO) Take  by mouth As Needed.     • XARELTO 20 MG tablet 20 mg Daily With Dinner.       No  facility-administered encounter medications on file as of 11/19/2020.      ADULT ILLNESSES:  Patient Active Problem List   Diagnosis Code   • Body mass index (BMI) of 50-59.9 in adult (CMS/[facility]) Z68.43   • Stage 3 chronic kidney disease N18.30   • Pancytopenia (CMS/[facility]) D61.818   • Hypersplenism D73.1   • Hypertension I10   • Non-alcoholic fatty liver disease K76.0   • Anemia in stage 3 chronic kidney disease N18.30, D63.1     SURGERIES:  Past Surgical History:   Procedure Laterality Date   • CARPAL TUNNEL RELEASE Right    • ENDOSCOPY AND COLONOSCOPY  03/2016   • FOREIGN BODY REMOVAL Right     leg   • HYSTERECTOMY      open; total   • LAPAROSCOPIC CHOLECYSTECTOMY     • SMALL INTESTINE SURGERY  06/2016   • UMBILICAL HERNIA REPAIR      not sure if has mesh in there or not (x4 surgeries)      HEALTH MAINTENANCE ITEMS:    Last Completed Mammogram       Status Date      MAMMOGRAM Done 10/26/2016 SCANNED - MAMMO     negative        FAMILY HISTORY:  Family History   Problem Relation Age of Onset   • Cancer Mother    • Hypertension Mother    • Heart disease Mother    • Breast cancer Mother    • Dementia Mother    • Parkinsonism Mother    • No Known Problems Father    • Hypertension Brother    • Other Maternal Grandmother         brain tumor   • No Known Problems Maternal Grandfather    • No Known Problems Paternal Grandmother    • No Known Problems Paternal Grandfather    • No Known Problems Brother      SOCIAL HISTORY:  Social History     Socioeconomic History   • Marital status:      Spouse name: Not on file   • Number of children: Not on file   • Years of education: Not on file   • Highest education level: Not on file   Tobacco Use   • Smoking status: Never Smoker   • Smokeless tobacco: Never Used   Substance and Sexual Activity   • Alcohol use: No   • Drug use: No   • Sexual activity: Defer     Birth control/protection: Surgical       REVIEW OF SYSTEMS:  Review of Systems   Constitutional: Positive for fatigue.  "Negative for activity change, appetite change, chills, diaphoresis, fever and unexpected weight loss.   HENT: Positive for hearing loss (Both ears with some loss, humming in ears at times). Negative for ear pain, nosebleeds, sinus pressure, sore throat and voice change.    Eyes: Negative for blurred vision, double vision, pain and visual disturbance.   Respiratory: Negative for cough and shortness of breath.    Cardiovascular: Negative for chest pain, palpitations and leg swelling.   Gastrointestinal: Negative for abdominal pain, anal bleeding, blood in stool, constipation, diarrhea, nausea and vomiting.        Large umbilical hernia \"bothersome due to the size\"   Endocrine: Negative for heat intolerance, polydipsia and polyuria.   Genitourinary: Negative for dysuria, frequency, hematuria, urgency and urinary incontinence.   Musculoskeletal: Negative for arthralgias and myalgias.   Skin: Negative for rash and skin lesions.   Neurological: Negative for dizziness, tremors, seizures, syncope, speech difficulty, weakness and headache.   Hematological: Negative for adenopathy. Does not bruise/bleed easily.   Psychiatric/Behavioral: Negative for dysphoric mood, sleep disturbance, suicidal ideas and depressed mood.       Body surface area is 2.02 meters squared.  Pain Score    11/19/20 0907   PainSc: 0-No pain       Physical Exam:  General Appearance:    Alert, cooperative, well nourished in no distress   Head:    Normocephalic, without obvious abnormality, atraumatic   Eyes:    PERRL, conjunctiva pink, sclera clear, EOM's intact   Ears:    Not examined   Nose:   Nares normal, septum midline, mucosa normal, no drainage    Throat:   Lips, mucosa, and tongue normal;mucous membranes moist   Neck:   Supple, Trachea midline   Lungs:     Clear to auscultation bilaterally, respirations unlabored   Chest Wall:    No tenderness or deformity    Heart:    Regular rate and rhythm, no murmur, rub or gallop   Abdomen:     Soft, bowel " sounds active all four quadrants,     Large umbilical hernia noted. Nontender, soft   Extremities:   Extremities without cyanosis or edema   Skin:   Skin color, texture, turgor normal, no rashes or lesions   Lymph nodes:   Cervical, supraclavicular, and axillary nodes normal   Neurologic:   Grossly nonfocal, gait coordinated and smooth, walks with    walker, cognition is preserved.              Mariia Parker reports a pain score of   Pain Score    11/19/20 0907   PainSc: 0-No pain     .  Patient's Body mass index is 80.08 kg/m². BMI is above normal parameters. Recommendations include: nutrition counseling.    LABS    Lab Results - Last 18 Months   Lab Units 11/19/20  0853 07/16/20  0934 02/10/20  1023 12/12/19  0844 11/14/19  1037 10/24/19  0929  06/12/19  1700   HEMOGLOBIN g/dL 11.7* 11.2* 12.4 11.2* 11.8* 11.2*   < > 11.7*   HEMATOCRIT % 36.7 36.0 39.6 35.7 38.9 36.4   < > 37.1   MCV fL 95.8 98.4* 96.6 97.5* 98.5* 97.3*   < > 93.7   WBC 10*3/mm3 4.99 4.77 5.35 4.60 5.08 4.25   < > 4.66*   RDW % 12.0* 12.3 12.3 12.6 12.8 13.3   < > 13.3   MPV fL 9.2 9.4 9.8 9.2 9.6 9.7   < > 11.6   PLATELETS 10*3/mm3 162 149 179 154 185 188   < > 171   IMM GRAN % % 0.2 0.2 0.6* 0.4 0.8* 0.5   < > 0.9   NEUTROS ABS 10*3/mm3 3.10 3.24 3.66 2.88 2.95 2.67   < > 3.02   LYMPHS ABS 10*3/mm3 1.18 1.07 1.24 1.16 1.52 1.03   < > 1.06   MONOS ABS 10*3/mm3 0.50 0.40 0.36 0.43 0.45 0.48   < > 0.45   EOS ABS 10*3/mm3 0.18 0.04 0.04 0.09 0.10 0.02   < > 0.05   BASOS ABS 10*3/mm3 0.02 0.01 0.02 0.02 0.02 0.03   < > 0.04   IMMATURE GRANS (ABS) 10*3/mm3 0.01 0.01 0.03 0.02 0.04 0.02   < > 0.04   NRBC /100 WBC  --   --   --   --   --   --   --  0.0    < > = values in this interval not displayed.       Lab Results - Last 18 Months   Lab Units 07/16/20  0934 02/10/20  1023 12/12/19  0844 09/10/19  1006 08/07/19  1700 07/08/19  1630   GLUCOSE mg/dL 143* 178* 183* 152* 132* 184*   SODIUM mmol/L 143 141 143 144 143 142   POTASSIUM mmol/L 4.2 4.2 4.1  "4.0 4.1 4.4   CO2 mmol/L 28.0 28.0 31.0* 29.0 27.0 28.0   CHLORIDE mmol/L 102 100 100 104 107 103   ANION GAP mmol/L 13.0 13.0 12.0 11.0 9.0 11.0   CREATININE mg/dL 1.05* 1.23* 1.12* 1.13* 1.22 1.01   BUN mg/dL 21 27* 23 18 15 20   BUN / CREAT RATIO  20.0 22.0 20.5 15.9 12.3 19.8   CALCIUM mg/dL 9.7 9.9 9.7 9.6 9.0 9.6   EGFR IF NONAFRICN AM mL/min/1.73 53* 44* 49* 49* 45* 55*   ALK PHOS U/L 117 130* 134* 111 124* 110   TOTAL PROTEIN g/dL 7.3 8.2 7.4 7.3 6.7 7.0   ALT (SGPT) U/L 19 20 29 19 29 29   AST (SGOT) U/L 20 20 23 20 29 24   BILIRUBIN mg/dL 0.7 0.7 0.6 0.7 0.5 0.7   ALBUMIN g/dL 4.40 4.60 4.30 4.20 3.90 4.30   GLOBULIN gm/dL 2.9 3.6 3.1 3.1 2.8 2.7     Lab Results - Last 18 Months   Lab Units 07/16/20  0934 02/10/20  1023 12/12/19  0844 09/19/19  1009 09/10/19  1006 08/07/19  1700 07/08/19  1630   IRON mcg/dL 72 69 67  --  68 51 84   TIBC mcg/dL 408 413 383  --  384 322 336   IRON SATURATION % 18* 17* 17*  --  18* 16* 25   FERRITIN ng/mL 178.20* 214.40* 168.30*  --  264.10* 96.90 80.00   FOLATE ng/mL >20.00 13.70  --  19.50  --   --   --      ASSESSMENT:   1. Pancytopenia, likely hypersplenism from non-alcoholic fatty liver disease. Counts overall stable today and normal.  The WBC and platelet have been normal for some time now and fluctuate.   2. Normocytic anemia with components of intermittent iron deficiency and chronic kidney disease Stage III, GFR 53 mL/minute in August 2020.  Hgb improved to 11.7 today.   3. Iron deficiency s/p Nulecit in August 2019. Ferritin normal at 178 and saturation slightly low at 18% in August. Stable hgb, therefore no need for iron at this time.    4. Obesity  5. Chronic kidney disease stage, GFR 53 mL/minute on 8/20/2020, improved. Todays, pending.  6. Non-alcoholic fatty liver disease  8. Transaminitis from non-alcoholic fatty liver disease - normalized as of 8/20/2020        9. Large umbilical hernia, chronic. Nontender and soft      10. Hearing loss in both ears, \"I'm " "having trouble hearing.\" Having some \"humming\" in her ears lenard the right ear    PLAN:   1.  Re: Stable heme status with hemoglobin 11.27 gm today 11/19/2020, which is imrpoved. WBC and plts are remaining normal.   3.  Re: Prior CMP. GFR 53 ml/minute- improved in August 2020, todays pending  4.  Re: Pre-office ferritin, TIBC, % saturation and iron. 18% saturation and ferritin 178. -stable. Todays pending.  5.  Re: Iron replacement not needed at present  6. Will plan Procrit 40,000 units subcutaneously weekly if hemoglobin less than 10 and hematocrit less than 30 to target a hemoglobin of 11 and hematocrit of 33% at [facility]. Move CBC to weekly if she starts Procrit.   7.   Continue ongoing management per primary care physician and other specialists.   8. Plan of care discussed with patient. Understanding expressed. Patient agreeable to proceed.   9. Return to [place] office in 4 months for follow up and preoffice cbc, cmp, iron profile and ferritin.    All activities occurring within this visit are in accordance with the plan of care as set forth by Dr. Dennis Lewis.    I spent 28 total minutes, face-to-face, caring for Mariia today.  Greater than 50% of this time involved counseling and/or coordination of care as documented within this note regarding the patient's illness(es), pros and cons of various treatment options, instructions and/or risk reduction.      JOAQUIN Gordon   11/19/2020      cc: JOAQUIN Cosby MD Elias Pitaras, MD Dale Jones, MD  "

## 2020-11-20 LAB
FOLATE SERPL-MCNC: 7.96 NG/ML (ref 4.78–24.2)
VIT B12 BLD-MCNC: 294 PG/ML (ref 211–946)

## 2020-11-23 ENCOUNTER — TELEPHONE (OUTPATIENT)
Dept: ONCOLOGY | Facility: CLINIC | Age: 65
End: 2020-11-23

## 2020-11-23 NOTE — TELEPHONE ENCOUNTER
notified pt she is going to try the tablets.    ----- Message from JOAQUIN Bailey sent at 11/22/2020  9:54 PM CST -----  Please let Mrs Cuevas know that her B12 was a little low.  She can take the oral B12 if she wants at 1000mcg  Twice daily for 2 weeks then one a day or she can do the injections.  If she wants the injections it will be 1000mcg weekly x4 weeks then monthly.

## 2021-03-24 NOTE — PROGRESS NOTES
[facility]  HEMATOLOGY & ONCOLOGY    [facility] ONC [facility] ONCOLOGY  [address] [place] [address] [address]  [facility] [address]  [phone]    Patient Name: Mariia Parker  Encounter Date: 3/24/2021  YOB: 1955  Patient Number: [id number]    Chief Complaint   Patient presents with   • Anemia       REASON FOR VISIT: Ms. 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 She is also seen for anemia from iron deficiency. She was intolerant to oral iron therapy and was switched to Nulecit in August 2019 and has not required iron replacement since. She is seen alone. History is obtained from the patient. History is considered to be reliable.     She presents today feeling well. She has no complaints. She has had no GI upset, fever or shortness of breath.      Labs today show her hgb to continue to improve and now at 11.9, MCV 96.4, plts of 192,000 and wbc of 5.33.  Ferritin was at 212.90, iron saturation of 14% in November 2020. CMP showed an improved GFR of 51ml/min at last appointment in November 2020.      DIAGNOSTIC ABNORMALITIES:   1. Endoscopy 03/2016: Mild erosive gastritis.  2. CBC 05/12/2016: WBC 5.4, hemoglobin 11.1, hematocrit 35.3, MCV 94.1, platelet count 193,000 with ANC 3.06.  3. Labs 09/13/2016: WBC 4.4, hemoglobin 8.7, hematocrit 28.1, MCV 93, platelets 119,000, ANC 2.73. Potassium 5.2, chloride 112, BUN 55, creatinine 4.67, AST 53, ALT 72, GFR 10. Serum iron 95, TIBC 365, TSH 5.81 (elevated). CRP less than 2.9.  4. PATHOLOGY: Comprehensive report 10/03/2016. Peripheral blood: Normochromic anemia with mild anisopoikilocytosis. Mild thrombocytopenia. Flow Impression: Peripheral blood: No increase in myeloid or lymphoid blasts identified. No immunophenotypically abnormal B- or T-cell population identified.   5. Liver/spleen scan, 01/20/2017,  [facility]: Hepatomegaly. Borderline enlarged spleen.     PREVIOUS INTERVENTIONS:   1. 2 units PRBCs 10/13/2016 at [facility].  2. Procrit 40,000 units subcutaneously 10/13/2016 through present at [facility]. Details not available.  3. Ferrous sulfate 325 mg 07/20/2017 through 01/22/2018. Resume 05/21/2018 through 09/17/2018. Resumed 11/21/2018 through 12/06/2018. Resume 05/13/2019.  4. Nulecit 125mg weekly x3 8/13/19, 8/20/19, 8/27/19      PAST MEDICAL HISTORY:  ALLERGIES:  No Known Allergies    CURRENT MEDICATIONS:  Outpatient Encounter Medications as of 3/25/2021   Medication Sig Dispense Refill   • acetaminophen (TYLENOL 8 HOUR ARTHRITIS PAIN) 650 MG 8 hr tablet Take 650 mg by mouth Every 8 (Eight) Hours As Needed for Mild Pain .     • amLODIPine (NORVASC) 10 MG tablet Take 10 mg by mouth Daily.     • atorvastatin (LIPITOR) 40 MG tablet Take 40 mg by mouth Daily.     • carvedilol (COREG) 25 MG tablet Take 25 mg by mouth 2 (Two) Times a Day With Meals.     • colestipol (COLESTID) 1 g tablet Take 1 g by mouth 2 (Two) Times a Day.     • FLUoxetine (PROzac) 20 MG capsule Take 40 mg by mouth Daily.     • furosemide (LASIX) 20 MG tablet Take 20 mg by mouth Daily. 1 or 2 tablets daily     • gabapentin (NEURONTIN) 300 MG capsule Take 900 mg by mouth 2 (Two) Times a Day.     • HYDROcodone-acetaminophen (NORCO) 5-325 MG per tablet Take 1 tablet by mouth Every 6 (Six) Hours As Needed for Moderate Pain . 12 tablet 0   • linagliptin (TRADJENTA) 5 MG tablet tablet Take 5 mg by mouth Daily.     • lisinopril (PRINIVIL,ZESTRIL) 20 MG tablet Take 20 mg by mouth Daily.     • Multiple Vitamins-Minerals (MULTIVITAMIN ADULT PO) Take  by mouth.     • pantoprazole (PROTONIX) 40 MG EC tablet Take 40 mg by mouth Daily.     • VITAMIN D, ERGOCALCIFEROL, PO Take  by mouth Daily.     • Wheat Dextrin (BENEFIBER PO) Take  by mouth As Needed.     • XARELTO 20 MG tablet 20 mg Daily  With Dinner.     • [DISCONTINUED] colestipol (COLESTID) 1 g tablet Take 1 g by mouth 2 (Two) Times a Day.       No facility-administered encounter medications on file as of 3/25/2021.     ADULT ILLNESSES:  Patient Active Problem List   Diagnosis Code   • Body mass index (BMI) of 50-59.9 in adult (CMS/[facility]) Z68.43   • Stage 3 chronic kidney disease (CMS/[facility]) N18.30   • Pancytopenia (CMS/[facility]) D61.818   • Hypersplenism D73.1   • Hypertension I10   • Non-alcoholic fatty liver disease K76.0   • Anemia in stage 3 chronic kidney disease (CMS/[facility]) N18.30, D63.1     SURGERIES:  Past Surgical History:   Procedure Laterality Date   • CARPAL TUNNEL RELEASE Right    • ENDOSCOPY AND COLONOSCOPY  03/2016   • FOREIGN BODY REMOVAL Right     leg   • HYSTERECTOMY      open; total   • LAPAROSCOPIC CHOLECYSTECTOMY     • SMALL INTESTINE SURGERY  06/2016   • UMBILICAL HERNIA REPAIR      not sure if has mesh in there or not (x4 surgeries)      HEALTH MAINTENANCE ITEMS:    Last Completed Mammogram       Status Date      MAMMOGRAM Done 10/26/2016 SCANNED - MAMMO     negative        FAMILY HISTORY:  Family History   Problem Relation Age of Onset   • Cancer Mother    • Hypertension Mother    • Heart disease Mother    • Breast cancer Mother    • Dementia Mother    • Parkinsonism Mother    • No Known Problems Father    • Hypertension Brother    • Other Maternal Grandmother         brain tumor   • No Known Problems Maternal Grandfather    • No Known Problems Paternal Grandmother    • No Known Problems Paternal Grandfather    • No Known Problems Brother      SOCIAL HISTORY:  Social History     Socioeconomic History   • Marital status:      Spouse name: Not on file   • Number of children: Not on file   • Years of education: Not on file   • Highest education level: Not on file   Tobacco Use   • Smoking status: Never Smoker   • Smokeless tobacco: Never Used   Substance and Sexual Activity   • Alcohol use: No   • Drug use: No   • Sexual  "activity: Defer     Birth control/protection: Surgical       REVIEW OF SYSTEMS:  Review of Systems   Constitutional: Positive for fatigue. Negative for activity change, appetite change, chills, diaphoresis, fever and unexpected weight loss.   HENT: Positive for hearing loss (Both ears with some loss, humming in ears at times, has seen ENT and audiologist since last appt and getting hearing aides.). Negative for ear pain, nosebleeds, sinus pressure, sore throat and voice change.    Eyes: Negative for blurred vision, double vision, pain and visual disturbance.   Respiratory: Negative for cough and shortness of breath.    Cardiovascular: Negative for chest pain, palpitations and leg swelling.   Gastrointestinal: Negative for abdominal pain, anal bleeding, blood in stool, constipation, diarrhea, nausea and vomiting.        Large umbilical hernia \"bothersome due to the size\"   Endocrine: Negative for heat intolerance, polydipsia and polyuria.   Genitourinary: Negative for dysuria, frequency, hematuria, urgency and urinary incontinence.   Musculoskeletal: Negative for arthralgias and myalgias.   Skin: Negative for rash and skin lesions.   Neurological: Negative for dizziness, tremors, seizures, syncope, speech difficulty, weakness and headache.   Hematological: Negative for adenopathy. Does not bruise/bleed easily.   Psychiatric/Behavioral: Negative for dysphoric mood, sleep disturbance, suicidal ideas and depressed mood.       Body surface area is 2.33 meters squared.  Pain Score    03/25/21 0947   PainSc: 0-No pain       Physical Exam:  Physical Exam  Constitutional:       Appearance: Normal appearance. She is well-developed.   HENT:      Head: Atraumatic.   Eyes:      General: No scleral icterus.     Pupils: Pupils are equal, round, and reactive to light.   Neck:      Trachea: Trachea normal.   Cardiovascular:      Rate and Rhythm: Normal rate and regular rhythm.      Heart sounds: No murmur heard.     Pulmonary:      " Effort: Pulmonary effort is normal.      Breath sounds: Normal breath sounds. No wheezing, rhonchi or rales.   Abdominal:      Palpations: Abdomen is soft.      Tenderness: There is no abdominal tenderness. There is no guarding or rebound.   Musculoskeletal:      Cervical back: Neck supple.   Neurological:      Mental Status: She is alert and oriented to person, place, and time.      Sensory: No sensory deficit.   Psychiatric:         Judgment: Judgment normal.         Mariia Parker reports a pain score of   Pain Score    03/25/21 0947   PainSc: 0-No pain     Patient's Body mass index is 55.09 kg/m². BMI is above normal parameters. Recommendations include: nutrition counseling.    LABS    Lab Results - Last 18 Months   Lab Units 03/25/21  0903 11/19/20  0853 07/16/20  0934 02/10/20  1023 12/12/19  0844 11/14/19  1037   HEMOGLOBIN g/dL 11.9* 11.7* 11.2* 12.4 11.2* 11.8*   HEMATOCRIT % 37.5 36.7 36.0 39.6 35.7 38.9   MCV fL 96.4 95.8 98.4* 96.6 97.5* 98.5*   WBC 10*3/mm3 5.33 4.99 4.77 5.35 4.60 5.08   RDW % 12.3 12.0* 12.3 12.3 12.6 12.8   MPV fL 9.6 9.2 9.4 9.8 9.2 9.6   PLATELETS 10*3/mm3 192 162 149 179 154 185   IMM GRAN % % 0.4 0.2 0.2 0.6* 0.4 0.8*   NEUTROS ABS 10*3/mm3 3.39 3.10 3.24 3.66 2.88 2.95   LYMPHS ABS 10*3/mm3 1.32 1.18 1.07 1.24 1.16 1.52   MONOS ABS 10*3/mm3 0.47 0.50 0.40 0.36 0.43 0.45   EOS ABS 10*3/mm3 0.09 0.18 0.04 0.04 0.09 0.10   BASOS ABS 10*3/mm3 0.04 0.02 0.01 0.02 0.02 0.02   IMMATURE GRANS (ABS) 10*3/mm3 0.02 0.01 0.01 0.03 0.02 0.04       Lab Results - Last 18 Months   Lab Units 11/19/20  0853 07/16/20  0934 02/10/20  1023 12/12/19  0844   GLUCOSE mg/dL 158* 143* 178* 183*   SODIUM mmol/L 141 143 141 143   POTASSIUM mmol/L 4.4 4.2 4.2 4.1   CO2 mmol/L 26.0 28.0 28.0 31.0*   CHLORIDE mmol/L 106 102 100 100   ANION GAP mmol/L 9.0 13.0 13.0 12.0   CREATININE mg/dL 1.08* 1.05* 1.23* 1.12*   BUN mg/dL 33* 21 27* 23   BUN / CREAT RATIO  30.6* 20.0 22.0 20.5   CALCIUM mg/dL 9.5 9.7 9.9  "9.7   EGFR IF NONAFRICN AM mL/min/1.73 51* 53* 44* 49*   ALK PHOS U/L 145* 117 130* 134*   TOTAL PROTEIN g/dL 7.4 7.3 8.2 7.4   ALT (SGPT) U/L 20 19 20 29   AST (SGOT) U/L 20 20 20 23   BILIRUBIN mg/dL 0.6 0.7 0.7 0.6   ALBUMIN g/dL 4.30 4.40 4.60 4.30   GLOBULIN gm/dL 3.1 2.9 3.6 3.1     Lab Results - Last 18 Months   Lab Units 11/19/20  0853 07/16/20  0934 02/10/20  1023 12/12/19  0844   IRON mcg/dL 60 72 69 67   TIBC mcg/dL 438 408 413 383   IRON SATURATION % 14* 18* 17* 17*   FERRITIN ng/mL 212.90* 178.20* 214.40* 168.30*   FOLATE ng/mL 7.96 >20.00 13.70  --      ASSESSMENT:   1. Pancytopenia, likely hypersplenism from non-alcoholic fatty liver disease. Counts have overall improved over the last year.  Her WBC and Plates have remained normal and her hgb has continued to slowly climb to 11.9 today.   2. Normocytic anemia with components of intermittent iron deficiency (currently resolved)  and chronic kidney disease Stage III, GFR 51 mL/minute in November 2020.  Hgb improved to 11.9 today.   3. Iron deficiency s/p Nulecit in August 2019. Has not required it since. Ferritin 212.90 and irons saturation 14% on November 2020. No need for iron replacement   4. Obesity  5. Chronic kidney disease stage, GFR 51 ml/min as of 11/19/2020, improved. Todays, pending.  6. Non-alcoholic fatty liver disease  8. Transaminitis from non-alcoholic fatty liver disease - normal on 11/192020.  Alkaline phosphatase does fluctuate and was at 145 on 11/19/2020.        9. Large umbilical hernia, chronic. Nontender and soft      10. Hearing loss in both ears as well as \"humming\".  Was seen by audiologist and ENT.  Patient plans to have hearing aids      for both ears by April 8.       11.  Slightly low B12 on 11/19/2020 at 294 stable folate at 7.96.  Was started on B12 supplementation with B12 tablets and      continues on these.    PLAN:   1.  Reviewed available lab work results with the patient today and will contact the patient with the " pending studies as available.    2.  No need for iron supplementation at this time.  3.  Continue B12 1000 mcg tablet daily        4.  Will plan Procrit 40,000 units subcutaneously weekly if hemoglobin becomes less than 10 and hematocrit less than  30 to target a hemoglobin of 11 and hematocrit of 33% at [facility]. Move CBC to weekly if  she starts Procrit.   5.   Continue ongoing management per primary care physician and other specialists.   6. Plan of care discussed with patient. Understanding expressed. Patient agreeable to proceed.   7. Return to [place] office in 6 months for follow up and preoffice cbc, cmp, iron profile and ferritin, B12 and folate    All activities occurring within this visit are in accordance with the plan of care as set forth by Dr. Dennis Lewis.    I spent 28 minutes caring for Mariia on this date of service. This time includes time spent by me in the following activities: preparing for the visit, reviewing tests, performing a medically appropriate examination and/or evaluation, counseling and educating the patient/family/caregiver, ordering medications, tests, or procedures and documenting information in the medical record.     JOAQUIN Gordon   03/25/2021  12:03 CDT    cc: JOAQUIN Cosby MD Elias Pitaras, MD Dale Jones, MD

## 2021-03-25 ENCOUNTER — OFFICE VISIT (OUTPATIENT)
Dept: ONCOLOGY | Facility: CLINIC | Age: 66
End: 2021-03-25

## 2021-03-25 ENCOUNTER — LAB (OUTPATIENT)
Dept: ONCOLOGY | Facility: CLINIC | Age: 66
End: 2021-03-25

## 2021-03-25 VITALS
SYSTOLIC BLOOD PRESSURE: 126 MMHG | RESPIRATION RATE: 20 BRPM | DIASTOLIC BLOOD PRESSURE: 70 MMHG | HEIGHT: 63 IN | WEIGHT: 293 LBS | HEART RATE: 78 BPM | TEMPERATURE: 97.3 F | BODY MASS INDEX: 51.91 KG/M2 | OXYGEN SATURATION: 93 %

## 2021-03-25 DIAGNOSIS — D63.1 ANEMIA IN STAGE 3 CHRONIC KIDNEY DISEASE (HCC): ICD-10-CM

## 2021-03-25 DIAGNOSIS — N18.30 ANEMIA IN STAGE 3 CHRONIC KIDNEY DISEASE (HCC): ICD-10-CM

## 2021-03-25 DIAGNOSIS — D61.818 PANCYTOPENIA (HCC): ICD-10-CM

## 2021-03-25 DIAGNOSIS — I10 ESSENTIAL HYPERTENSION: ICD-10-CM

## 2021-03-25 DIAGNOSIS — K76.0 NON-ALCOHOLIC FATTY LIVER DISEASE: ICD-10-CM

## 2021-03-25 DIAGNOSIS — N18.31 ANEMIA IN STAGE 3A CHRONIC KIDNEY DISEASE (HCC): Primary | ICD-10-CM

## 2021-03-25 DIAGNOSIS — D63.1 ANEMIA IN STAGE 3A CHRONIC KIDNEY DISEASE (HCC): Primary | ICD-10-CM

## 2021-03-25 DIAGNOSIS — D73.1 HYPERSPLENISM: ICD-10-CM

## 2021-03-25 LAB
ALBUMIN SERPL-MCNC: 4.2 G/DL (ref 3.5–5.2)
ALBUMIN/GLOB SERPL: 1.3 G/DL
ALP SERPL-CCNC: 149 U/L (ref 39–117)
ALT SERPL W P-5'-P-CCNC: 24 U/L (ref 1–33)
ANION GAP SERPL CALCULATED.3IONS-SCNC: 11 MMOL/L (ref 5–15)
AST SERPL-CCNC: 24 U/L (ref 1–32)
BASOPHILS # BLD AUTO: 0.04 10*3/MM3 (ref 0–0.2)
BASOPHILS NFR BLD AUTO: 0.8 % (ref 0–1.5)
BILIRUB SERPL-MCNC: 0.6 MG/DL (ref 0–1.2)
BUN SERPL-MCNC: 23 MG/DL (ref 8–23)
BUN/CREAT SERPL: 23 (ref 7–25)
CALCIUM SPEC-SCNC: 9.5 MG/DL (ref 8.6–10.5)
CHLORIDE SERPL-SCNC: 104 MMOL/L (ref 98–107)
CO2 SERPL-SCNC: 27 MMOL/L (ref 22–29)
CREAT SERPL-MCNC: 1 MG/DL (ref 0.57–1)
DEPRECATED RDW RBC AUTO: 43.7 FL (ref 37–54)
EOSINOPHIL # BLD AUTO: 0.09 10*3/MM3 (ref 0–0.4)
EOSINOPHIL NFR BLD AUTO: 1.7 % (ref 0.3–6.2)
ERYTHROCYTE [DISTWIDTH] IN BLOOD BY AUTOMATED COUNT: 12.3 % (ref 12.3–15.4)
FERRITIN SERPL-MCNC: 196.7 NG/ML (ref 13–150)
GFR SERPL CREATININE-BSD FRML MDRD: 56 ML/MIN/1.73
GLOBULIN UR ELPH-MCNC: 3.3 GM/DL
GLUCOSE SERPL-MCNC: 163 MG/DL (ref 65–99)
HCT VFR BLD AUTO: 37.5 % (ref 34–46.6)
HGB BLD-MCNC: 11.9 G/DL (ref 12–15.9)
IMM GRANULOCYTES # BLD AUTO: 0.02 10*3/MM3 (ref 0–0.05)
IMM GRANULOCYTES NFR BLD AUTO: 0.4 % (ref 0–0.5)
IRON 24H UR-MRATE: 69 MCG/DL (ref 37–145)
IRON SATN MFR SERPL: 15 % (ref 20–50)
LYMPHOCYTES # BLD AUTO: 1.32 10*3/MM3 (ref 0.7–3.1)
LYMPHOCYTES NFR BLD AUTO: 24.8 % (ref 19.6–45.3)
MCH RBC QN AUTO: 30.6 PG (ref 26.6–33)
MCHC RBC AUTO-ENTMCNC: 31.7 G/DL (ref 31.5–35.7)
MCV RBC AUTO: 96.4 FL (ref 79–97)
MONOCYTES # BLD AUTO: 0.47 10*3/MM3 (ref 0.1–0.9)
MONOCYTES NFR BLD AUTO: 8.8 % (ref 5–12)
NEUTROPHILS NFR BLD AUTO: 3.39 10*3/MM3 (ref 1.7–7)
NEUTROPHILS NFR BLD AUTO: 63.5 % (ref 42.7–76)
PLATELET # BLD AUTO: 192 10*3/MM3 (ref 140–450)
PMV BLD AUTO: 9.6 FL (ref 6–12)
POTASSIUM SERPL-SCNC: 4.3 MMOL/L (ref 3.5–5.2)
PROT SERPL-MCNC: 7.5 G/DL (ref 6–8.5)
RBC # BLD AUTO: 3.89 10*6/MM3 (ref 3.77–5.28)
SODIUM SERPL-SCNC: 142 MMOL/L (ref 136–145)
TIBC SERPL-MCNC: 462 MCG/DL (ref 298–536)
TRANSFERRIN SERPL-MCNC: 310 MG/DL (ref 200–360)
WBC # BLD AUTO: 5.33 10*3/MM3 (ref 3.4–10.8)

## 2021-03-25 PROCEDURE — 82746 ASSAY OF FOLIC ACID SERUM: CPT | Performed by: NURSE PRACTITIONER

## 2021-03-25 PROCEDURE — 84466 ASSAY OF TRANSFERRIN: CPT | Performed by: NURSE PRACTITIONER

## 2021-03-25 PROCEDURE — 85025 COMPLETE CBC W/AUTO DIFF WBC: CPT | Performed by: NURSE PRACTITIONER

## 2021-03-25 PROCEDURE — 82728 ASSAY OF FERRITIN: CPT | Performed by: NURSE PRACTITIONER

## 2021-03-25 PROCEDURE — 82607 VITAMIN B-12: CPT | Performed by: NURSE PRACTITIONER

## 2021-03-25 PROCEDURE — 83540 ASSAY OF IRON: CPT | Performed by: NURSE PRACTITIONER

## 2021-03-25 PROCEDURE — 80053 COMPREHEN METABOLIC PANEL: CPT | Performed by: NURSE PRACTITIONER

## 2021-03-25 PROCEDURE — 99213 OFFICE O/P EST LOW 20 MIN: CPT | Performed by: NURSE PRACTITIONER

## 2021-03-25 RX ORDER — LISINOPRIL 20 MG/1
20 TABLET ORAL DAILY
COMMUNITY

## 2021-03-25 RX ORDER — MONTELUKAST SODIUM 4 MG/1
1 TABLET, CHEWABLE ORAL 2 TIMES DAILY
COMMUNITY

## 2021-03-26 LAB
FOLATE SERPL-MCNC: 8.17 NG/ML (ref 4.78–24.2)
VIT B12 BLD-MCNC: 703 PG/ML (ref 211–946)

## 2021-09-21 DIAGNOSIS — D73.1 HYPERSPLENISM: ICD-10-CM

## 2021-09-21 DIAGNOSIS — D50.9 IRON DEFICIENCY ANEMIA, UNSPECIFIED IRON DEFICIENCY ANEMIA TYPE: ICD-10-CM

## 2021-09-21 DIAGNOSIS — D63.1 ANEMIA IN STAGE 3A CHRONIC KIDNEY DISEASE (HCC): Primary | ICD-10-CM

## 2021-09-21 DIAGNOSIS — N18.31 ANEMIA IN STAGE 3A CHRONIC KIDNEY DISEASE (HCC): Primary | ICD-10-CM

## 2021-09-23 ENCOUNTER — LAB (OUTPATIENT)
Dept: ONCOLOGY | Facility: CLINIC | Age: 66
End: 2021-09-23

## 2021-09-23 ENCOUNTER — OFFICE VISIT (OUTPATIENT)
Dept: ONCOLOGY | Facility: CLINIC | Age: 66
End: 2021-09-23

## 2021-09-23 VITALS
HEART RATE: 74 BPM | WEIGHT: 293 LBS | RESPIRATION RATE: 16 BRPM | OXYGEN SATURATION: 96 % | HEIGHT: 63 IN | DIASTOLIC BLOOD PRESSURE: 82 MMHG | TEMPERATURE: 97.8 F | BODY MASS INDEX: 51.91 KG/M2 | SYSTOLIC BLOOD PRESSURE: 128 MMHG

## 2021-09-23 DIAGNOSIS — I10 ESSENTIAL HYPERTENSION: ICD-10-CM

## 2021-09-23 DIAGNOSIS — Z12.31 SCREENING MAMMOGRAM, ENCOUNTER FOR: Primary | ICD-10-CM

## 2021-09-23 DIAGNOSIS — Z12.31 SCREENING MAMMOGRAM, ENCOUNTER FOR: ICD-10-CM

## 2021-09-23 DIAGNOSIS — Z92.89 HISTORY OF SCREENING MAMMOGRAPHY: ICD-10-CM

## 2021-09-23 DIAGNOSIS — D61.818 PANCYTOPENIA (HCC): ICD-10-CM

## 2021-09-23 DIAGNOSIS — D50.9 IRON DEFICIENCY ANEMIA, UNSPECIFIED IRON DEFICIENCY ANEMIA TYPE: ICD-10-CM

## 2021-09-23 DIAGNOSIS — D73.1 HYPERSPLENISM: ICD-10-CM

## 2021-09-23 DIAGNOSIS — D63.1 ANEMIA IN STAGE 3A CHRONIC KIDNEY DISEASE (HCC): ICD-10-CM

## 2021-09-23 DIAGNOSIS — N18.31 ANEMIA IN STAGE 3A CHRONIC KIDNEY DISEASE (HCC): ICD-10-CM

## 2021-09-23 LAB
ALBUMIN SERPL-MCNC: 4.4 G/DL (ref 3.5–5.2)
ALBUMIN/GLOB SERPL: 1.4 G/DL
ALP SERPL-CCNC: 150 U/L (ref 39–117)
ALT SERPL W P-5'-P-CCNC: 25 U/L (ref 1–33)
ANION GAP SERPL CALCULATED.3IONS-SCNC: 11 MMOL/L (ref 5–15)
AST SERPL-CCNC: 25 U/L (ref 1–32)
BASOPHILS # BLD AUTO: 0.02 10*3/MM3 (ref 0–0.2)
BASOPHILS NFR BLD AUTO: 0.4 % (ref 0–1.5)
BILIRUB SERPL-MCNC: 0.6 MG/DL (ref 0–1.2)
BUN SERPL-MCNC: 25 MG/DL (ref 8–23)
BUN/CREAT SERPL: 22.3 (ref 7–25)
CALCIUM SPEC-SCNC: 9.8 MG/DL (ref 8.6–10.5)
CHLORIDE SERPL-SCNC: 105 MMOL/L (ref 98–107)
CO2 SERPL-SCNC: 26 MMOL/L (ref 22–29)
CREAT SERPL-MCNC: 1.12 MG/DL (ref 0.57–1)
DEPRECATED RDW RBC AUTO: 44.4 FL (ref 37–54)
EOSINOPHIL # BLD AUTO: 0.08 10*3/MM3 (ref 0–0.4)
EOSINOPHIL NFR BLD AUTO: 1.7 % (ref 0.3–6.2)
ERYTHROCYTE [DISTWIDTH] IN BLOOD BY AUTOMATED COUNT: 12.2 % (ref 12.3–15.4)
FERRITIN SERPL-MCNC: 174.1 NG/ML (ref 13–150)
GFR SERPL CREATININE-BSD FRML MDRD: 49 ML/MIN/1.73
GLOBULIN UR ELPH-MCNC: 3.2 GM/DL
GLUCOSE SERPL-MCNC: 202 MG/DL (ref 65–99)
HCT VFR BLD AUTO: 36.7 % (ref 34–46.6)
HGB BLD-MCNC: 11.4 G/DL (ref 12–15.9)
IMM GRANULOCYTES # BLD AUTO: 0.03 10*3/MM3 (ref 0–0.05)
IMM GRANULOCYTES NFR BLD AUTO: 0.6 % (ref 0–0.5)
IRON 24H UR-MRATE: 77 MCG/DL (ref 37–145)
IRON SATN MFR SERPL: 16 % (ref 20–50)
LYMPHOCYTES # BLD AUTO: 1.19 10*3/MM3 (ref 0.7–3.1)
LYMPHOCYTES NFR BLD AUTO: 24.9 % (ref 19.6–45.3)
MCH RBC QN AUTO: 30.5 PG (ref 26.6–33)
MCHC RBC AUTO-ENTMCNC: 31.1 G/DL (ref 31.5–35.7)
MCV RBC AUTO: 98.1 FL (ref 79–97)
MONOCYTES # BLD AUTO: 0.43 10*3/MM3 (ref 0.1–0.9)
MONOCYTES NFR BLD AUTO: 9 % (ref 5–12)
NEUTROPHILS NFR BLD AUTO: 3.02 10*3/MM3 (ref 1.7–7)
NEUTROPHILS NFR BLD AUTO: 63.4 % (ref 42.7–76)
PLATELET # BLD AUTO: 174 10*3/MM3 (ref 140–450)
PMV BLD AUTO: 9.1 FL (ref 6–12)
POTASSIUM SERPL-SCNC: 5.2 MMOL/L (ref 3.5–5.2)
PROT SERPL-MCNC: 7.6 G/DL (ref 6–8.5)
RBC # BLD AUTO: 3.74 10*6/MM3 (ref 3.77–5.28)
SODIUM SERPL-SCNC: 142 MMOL/L (ref 136–145)
TIBC SERPL-MCNC: 474 MCG/DL (ref 298–536)
TRANSFERRIN SERPL-MCNC: 318 MG/DL (ref 200–360)
WBC # BLD AUTO: 4.77 10*3/MM3 (ref 3.4–10.8)

## 2021-09-23 PROCEDURE — 82728 ASSAY OF FERRITIN: CPT | Performed by: NURSE PRACTITIONER

## 2021-09-23 PROCEDURE — 99214 OFFICE O/P EST MOD 30 MIN: CPT | Performed by: NURSE PRACTITIONER

## 2021-09-23 PROCEDURE — 80053 COMPREHEN METABOLIC PANEL: CPT | Performed by: NURSE PRACTITIONER

## 2021-09-23 PROCEDURE — 84466 ASSAY OF TRANSFERRIN: CPT | Performed by: NURSE PRACTITIONER

## 2021-09-23 PROCEDURE — 82746 ASSAY OF FOLIC ACID SERUM: CPT | Performed by: NURSE PRACTITIONER

## 2021-09-23 PROCEDURE — 85025 COMPLETE CBC W/AUTO DIFF WBC: CPT | Performed by: NURSE PRACTITIONER

## 2021-09-23 PROCEDURE — 83540 ASSAY OF IRON: CPT | Performed by: NURSE PRACTITIONER

## 2021-09-23 PROCEDURE — 82607 VITAMIN B-12: CPT | Performed by: NURSE PRACTITIONER

## 2021-09-23 NOTE — PROGRESS NOTES
"[facility]  HEMATOLOGY & ONCOLOGY    MG ONC [facility] ONCOLOGY  [address] [place] [address] [address]  [facility] [address]  [phone]    Patient Name: Mariia Parker  Encounter Date: 9/23/2021  YOB: 1955  Patient Number: [id number]    Chief Complaint   Patient presents with   • Anemia     Here for f/u       REASON FOR VISIT: Ms. 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 She is also seen for multifactoral anemia from iron deficiency, hypersplenism and CKD stage III. She was intolerant to oral iron therapy and was switched to Nulecit in August 2019. She has not required iron replacement since. History is obtained from the patient. History is considered to be reliable.     She presents today feeling well. She has no complaints except for being \"tired all the time.\" She has had no GI upset, fever or shortness of breath. She has had no changes in her bowels or bladder habits.      Labs today show her hgb to be stable at 11.4, Hct 36.7, MCV 96.4, plts of 174,000 and MCV of 98.1.  Ferritin was at 196.70 and iron saturation of 15% on 3/25/2021. CMP showed an improved GFR of 56ml/min at last appointment in March 2021.      DIAGNOSTIC ABNORMALITIES:   1. Endoscopy 03/2016: Mild erosive gastritis.  2. CBC 05/12/2016: WBC 5.4, hemoglobin 11.1, hematocrit 35.3, MCV 94.1, platelet count 193,000 with ANC 3.06.  3. Labs 09/13/2016: WBC 4.4, hemoglobin 8.7, hematocrit 28.1, MCV 93, platelets 119,000, ANC 2.73. Potassium 5.2, chloride 112, BUN 55, creatinine 4.67, AST 53, ALT 72, GFR 10. Serum iron 95, TIBC 365, TSH 5.81 (elevated). CRP less than 2.9.  4. PATHOLOGY: Comprehensive report 10/03/2016. Peripheral blood: Normochromic anemia with mild anisopoikilocytosis. Mild thrombocytopenia. Flow Impression: Peripheral blood: No increase " in myeloid or lymphoid blasts identified. No immunophenotypically abnormal B- or T-cell population identified.   5. Liver/spleen scan, 01/20/2017, [facility]: Hepatomegaly. Borderline enlarged spleen.     PREVIOUS INTERVENTIONS:   1. 2 units PRBCs 10/13/2016 at [facility].  2. Procrit 40,000 units subcutaneously 10/13/2016 through present at [facility]. Details not available.  3. Ferrous sulfate 325 mg 07/20/2017 through 01/22/2018. Resume 05/21/2018 through 09/17/2018. Resumed 11/21/2018 through 12/06/2018. Resume 05/13/2019.  4. Nulecit 125mg weekly x3 8/13/19, 8/20/19, 8/27/19      PAST MEDICAL HISTORY:  ALLERGIES:  No Known Allergies    CURRENT MEDICATIONS:  Outpatient Encounter Medications as of 9/23/2021   Medication Sig Dispense Refill   • acetaminophen (TYLENOL 8 HOUR ARTHRITIS PAIN) 650 MG 8 hr tablet Take 650 mg by mouth Every 8 (Eight) Hours As Needed for Mild Pain .     • amLODIPine (NORVASC) 10 MG tablet Take 10 mg by mouth Daily.     • atorvastatin (LIPITOR) 40 MG tablet Take 40 mg by mouth Daily.     • carvedilol (COREG) 25 MG tablet Take 25 mg by mouth 2 (Two) Times a Day With Meals.     • colestipol (COLESTID) 1 g tablet Take 1 g by mouth 2 (Two) Times a Day.     • FLUoxetine (PROzac) 20 MG capsule Take 40 mg by mouth Daily.     • furosemide (LASIX) 20 MG tablet Take 20 mg by mouth Daily. 1 or 2 tablets daily     • gabapentin (NEURONTIN) 300 MG capsule Take 900 mg by mouth 2 (Two) Times a Day.     • linagliptin (TRADJENTA) 5 MG tablet tablet Take 5 mg by mouth Daily.     • lisinopril (PRINIVIL,ZESTRIL) 20 MG tablet Take 20 mg by mouth Daily.     • Multiple Vitamins-Minerals (MULTIVITAMIN ADULT PO) Take  by mouth.     • pantoprazole (PROTONIX) 40 MG EC tablet Take 40 mg by mouth Daily.     • VITAMIN D, ERGOCALCIFEROL, PO Take  by mouth Daily.     • Wheat Dextrin (BENEFIBER PO) Take  by mouth As Needed.     • XARELTO 20 MG tablet 20 mg  Daily With Dinner.     • [DISCONTINUED] HYDROcodone-acetaminophen (NORCO) 5-325 MG per tablet Take 1 tablet by mouth Every 6 (Six) Hours As Needed for Moderate Pain . 12 tablet 0     No facility-administered encounter medications on file as of 9/23/2021.     ADULT ILLNESSES:  Patient Active Problem List   Diagnosis Code   • Body mass index (BMI) of 50-59.9 in adult (CMS/[facility]) Z68.43   • Stage 3 chronic kidney disease (CMS/HCC) N18.30   • Pancytopenia (CMS/[facility]) D61.818   • Hypersplenism D73.1   • Hypertension I10   • Non-alcoholic fatty liver disease K76.0   • Anemia in stage 3 chronic kidney disease (CMS/HCC) N18.30, D63.1     SURGERIES:  Past Surgical History:   Procedure Laterality Date   • CARPAL TUNNEL RELEASE Right    • ENDOSCOPY AND COLONOSCOPY  03/2016   • FOREIGN BODY REMOVAL Right     leg   • HYSTERECTOMY      open; total   • LAPAROSCOPIC CHOLECYSTECTOMY     • SMALL INTESTINE SURGERY  06/2016   • UMBILICAL HERNIA REPAIR      not sure if has mesh in there or not (x4 surgeries)      HEALTH MAINTENANCE ITEMS:    Last Completed Mammogram       Status Date      MAMMOGRAM Done 10/26/2016 SCANNED - MAMMO     negative        FAMILY HISTORY:  Family History   Problem Relation Age of Onset   • Cancer Mother    • Hypertension Mother    • Heart disease Mother    • Breast cancer Mother    • Dementia Mother    • Parkinsonism Mother    • No Known Problems Father    • Hypertension Brother    • Other Maternal Grandmother         brain tumor   • No Known Problems Maternal Grandfather    • No Known Problems Paternal Grandmother    • No Known Problems Paternal Grandfather    • No Known Problems Brother      SOCIAL HISTORY:  Social History     Socioeconomic History   • Marital status:      Spouse name: Not on file   • Number of children: Not on file   • Years of education: Not on file   • Highest education level: Not on file   Tobacco Use   • Smoking status: Never Smoker   • Smokeless tobacco: Never Used   Substance and  "Sexual Activity   • Alcohol use: No   • Drug use: No   • Sexual activity: Defer     Birth control/protection: Surgical       REVIEW OF SYSTEMS:  Review of Systems   Constitutional: Positive for fatigue (\"tired all the time\"). Negative for activity change, appetite change, chills, diaphoresis, fever and unexpected weight loss.   HENT: Positive for hearing loss (follows with ENT.). Negative for ear pain, nosebleeds, sinus pressure, sore throat and voice change.    Eyes: Negative for blurred vision, double vision, pain and visual disturbance.   Respiratory: Negative for cough and shortness of breath.    Cardiovascular: Negative for chest pain, palpitations and leg swelling.   Gastrointestinal: Negative for abdominal pain, anal bleeding, blood in stool, constipation, diarrhea, nausea and vomiting.        Large umbilical hernia \"bothersome due to the size\"   Endocrine: Negative for heat intolerance, polydipsia and polyuria.   Genitourinary: Negative for dysuria, frequency, hematuria, urgency and urinary incontinence.   Musculoskeletal: Negative for arthralgias and myalgias.   Skin: Negative for rash and skin lesions.   Neurological: Negative for dizziness, tremors, seizures, syncope, speech difficulty, weakness and headache.   Hematological: Negative for adenopathy. Does not bruise/bleed easily.   Psychiatric/Behavioral: Negative for dysphoric mood, sleep disturbance, suicidal ideas and depressed mood.       Body surface area is 2.33 meters squared.  Pain Score    09/23/21 0920   PainSc: 0-No pain       Physical Exam:  Physical Exam  Constitutional:       Appearance: Normal appearance. She is well-developed.   HENT:      Head: Atraumatic.   Eyes:      General: No scleral icterus.     Pupils: Pupils are equal, round, and reactive to light.   Neck:      Trachea: Trachea normal.   Cardiovascular:      Rate and Rhythm: Normal rate and regular rhythm.      Heart sounds: No murmur heard.     Pulmonary:      Effort: Pulmonary " effort is normal.      Breath sounds: Normal breath sounds. No wheezing, rhonchi or rales.   Abdominal:      General: Bowel sounds are normal.      Palpations: Abdomen is soft.      Tenderness: There is no abdominal tenderness. There is no guarding.      Hernia: A hernia is present.   Musculoskeletal:      Cervical back: Neck supple.   Skin:     General: Skin is warm and dry.   Neurological:      General: No focal deficit present.      Mental Status: She is alert and oriented to person, place, and time.   Psychiatric:         Mood and Affect: Mood normal.         Behavior: Behavior normal.         Mariia Parker reports a pain score of   Pain Score    09/23/21 0920   PainSc: 0-No pain     Patient's Body mass index is 54.74 kg/m². BMI is above normal parameters. Recommendations include: nutrition counseling.    LABS    Lab Results - Last 18 Months   Lab Units 09/23/21  0909 03/25/21  0903 11/19/20  0853 07/16/20  0934   HEMOGLOBIN g/dL 11.4* 11.9* 11.7* 11.2*   HEMATOCRIT % 36.7 37.5 36.7 36.0   MCV fL 98.1* 96.4 95.8 98.4*   WBC 10*3/mm3 4.77 5.33 4.99 4.77   RDW % 12.2* 12.3 12.0* 12.3   MPV fL 9.1 9.6 9.2 9.4   PLATELETS 10*3/mm3 174 192 162 149   IMM GRAN % % 0.6* 0.4 0.2 0.2   NEUTROS ABS 10*3/mm3 3.02 3.39 3.10 3.24   LYMPHS ABS 10*3/mm3 1.19 1.32 1.18 1.07   MONOS ABS 10*3/mm3 0.43 0.47 0.50 0.40   EOS ABS 10*3/mm3 0.08 0.09 0.18 0.04   BASOS ABS 10*3/mm3 0.02 0.04 0.02 0.01   IMMATURE GRANS (ABS) 10*3/mm3 0.03 0.02 0.01 0.01       Lab Results - Last 18 Months   Lab Units 03/25/21  0903 11/19/20  0853 07/16/20  0934   GLUCOSE mg/dL 163* 158* 143*   SODIUM mmol/L 142 141 143   POTASSIUM mmol/L 4.3 4.4 4.2   CO2 mmol/L 27.0 26.0 28.0   CHLORIDE mmol/L 104 106 102   ANION GAP mmol/L 11.0 9.0 13.0   CREATININE mg/dL 1.00 1.08* 1.05*   BUN mg/dL 23 33* 21   BUN / CREAT RATIO  23.0 30.6* 20.0   CALCIUM mg/dL 9.5 9.5 9.7   EGFR IF NONAFRICN AM mL/min/1.73 56* 51* 53*   ALK PHOS U/L 149* 145* 117   TOTAL PROTEIN  g/dL 7.5 7.4 7.3   ALT (SGPT) U/L 24 20 19   AST (SGOT) U/L 24 20 20   BILIRUBIN mg/dL 0.6 0.6 0.7   ALBUMIN g/dL 4.20 4.30 4.40   GLOBULIN gm/dL 3.3 3.1 2.9     Lab Results - Last 18 Months   Lab Units 03/25/21  0903 11/19/20  0853 07/16/20  0934   IRON mcg/dL 69 60 72   TIBC mcg/dL 462 438 408   IRON SATURATION % 15* 14* 18*   FERRITIN ng/mL 196.70* 212.90* 178.20*   FOLATE ng/mL 8.17 7.96 >20.00     ASSESSMENT:   1. Pancytopenia, likely hypersplenism from non-alcoholic fatty liver disease. Counts have remained stable over the last year.  Her WBC and Platelets have remained normal and her hgb has continued to remain stable as stated above.   2. Multifactorial Anemia with intermittent macrocytosis, has had components of intermittent iron deficiency and chronic kidney disease Stage III, GFR 56 mL/minute in March 2021.  Hgb improved to 11.4 today.   3. Iron deficiency s/p Nulecit in August 2019. Has not required it since. Ferritin 196.70 and irons saturation 15% on 3/25/2021. Todays are pending.   4. Obesity; BMI 54.8  5. Chronic kidney disease stage, GFR 56 ml/min as of 3/25/2021, improved. Todays, pending.  6. Non-alcoholic fatty liver disease; LFTs normal, ALKP stablet 149.  7. Large umbilical hernia, chronic. Nontender and soft  8. Macrocytosis today with MCV of 98.1. Has history of slightly low B12. Has been on B12 supplementation with B12 tablets and continues on these.  9.  Last mammogram was 5 years ago, Pt agrees to have a repeat    PLAN:   1. Reviewed available lab work results with the patient today and will contact the patient with the pending studies as available.    2. Continue B12 1000 mcg tablet daily  3. Will await values of iron, b12 and folate  4. Will plan Procrit 40,000 units subcutaneously weekly if hemoglobin becomes less than 10 and hematocrit less than 30 to target a hemoglobin of 11 and hematocrit of 33%. Move CBC to weekly if she starts Procrit.   5. Continue ongoing management per primary  care physician and other specialists.   6. Plan of care discussed with patient. Understanding expressed. Patient agreeable to proceed.   7. [place] [facility]  8. Return to [place] office in 6 months for follow up and preoffice cbc, cmp, iron profile and ferritin, B12 and folate    All activities occurring within this visit are in accordance with the plan of care as set forth by Dr. Dennis Lewis.    I spent 32 minutes caring for Mariia on this date of service. This time includes time spent by me in the following activities: preparing for the visit, reviewing tests, performing a medically appropriate examination and/or evaluation, counseling and educating the patient/family/caregiver, ordering medications, tests, or procedures and documenting information in the medical record.     JOAQUIN Gordon   09/23/2021  15:38 CDT    cc: JOAQUIN Cosby MD Elias Pitaras, MD Dale Jones, MD

## 2021-09-24 LAB
FOLATE SERPL-MCNC: 16.7 NG/ML (ref 4.78–24.2)
VIT B12 BLD-MCNC: 1202 PG/ML (ref 211–946)

## 2021-10-05 ENCOUNTER — TELEPHONE (OUTPATIENT)
Dept: ONCOLOGY | Facility: CLINIC | Age: 66
End: 2021-10-05

## 2021-10-05 NOTE — TELEPHONE ENCOUNTER
Caller: PAUL     Relationship: AT HOSPITAL     Best call back number: [phone]    What is the best time to reach you: PATIENTS APPT IS AT 9:00     Who are you requesting to speak with (clinical staff, provider,  specific staff member): NURSE    What was the call regarding: PER PAUL, PATIENT HAS A 9:00 A.M. APPT FOR A MAMMOGRAM AND THEY NEED ORDER FAXED.     FAX #: [phone]    Do you require a callback: ONLY FOR FURTHER QUESTIONS OR CONCERNS

## 2021-10-11 ENCOUNTER — TELEPHONE (OUTPATIENT)
Dept: ONCOLOGY | Facility: CLINIC | Age: 66
End: 2021-10-11

## 2021-10-11 NOTE — TELEPHONE ENCOUNTER
Left v/m notifying pt of normal mammogram     ----- Message from JOAQUIN Bailey sent at 10/10/2021  9:45 AM CDT -----  Please let Mrs Cuevas know that her mammogram is normal

## 2022-01-04 ENCOUNTER — TELEPHONE (OUTPATIENT)
Dept: ONCOLOGY | Facility: CLINIC | Age: 67
End: 2022-01-04

## 2022-01-04 NOTE — TELEPHONE ENCOUNTER
SPOKE W/PT REGARDING CLOSE OF MAY OFFICE AND NEEDING TO R/S HER 3/22/22 APPT TO [facility] W/DR GRANADOS. SHE STATES THAT SHE WILL CALL BACK IF SHE WANTS TO MAKE APPT.

## 2022-03-21 ENCOUNTER — TELEPHONE (OUTPATIENT)
Dept: ONCOLOGY | Facility: CLINIC | Age: 67
End: 2022-03-21

## 2022-03-21 NOTE — TELEPHONE ENCOUNTER
Caller: Karna Parker    Relationship: Emergency Contact    Best call back number: [phone]    What is the best time to reach you: ASAP    Who are you requesting to speak with (clinical staff, provider,  specific staff member):     Do you know the name of the person who called:     What was the call regarding: PT WANTS TO SCHEDULE, NEEDS TO SEE A NEW PROVIDER    Do you require a callback: YES

## 2022-04-06 ENCOUNTER — PROCEDURE VISIT (OUTPATIENT)
Dept: ENT CLINIC | Age: 67
End: 2022-04-06
Payer: MEDICARE

## 2022-04-06 ENCOUNTER — OFFICE VISIT (OUTPATIENT)
Dept: ENT CLINIC | Age: 67
End: 2022-04-06
Payer: MEDICARE

## 2022-04-06 VITALS
BODY MASS INDEX: 51.21 KG/M2 | SYSTOLIC BLOOD PRESSURE: 124 MMHG | DIASTOLIC BLOOD PRESSURE: 66 MMHG | HEIGHT: 63 IN | WEIGHT: 289 LBS

## 2022-04-06 DIAGNOSIS — H92.02 LEFT EAR PAIN: Primary | ICD-10-CM

## 2022-04-06 DIAGNOSIS — H90.3 SENSORINEURAL HEARING LOSS (SNHL) OF BOTH EARS: ICD-10-CM

## 2022-04-06 DIAGNOSIS — H92.02 OTALGIA OF LEFT EAR: ICD-10-CM

## 2022-04-06 DIAGNOSIS — H90.6 MIXED CONDUCTIVE AND SENSORINEURAL HEARING LOSS OF BOTH EARS: ICD-10-CM

## 2022-04-06 DIAGNOSIS — H91.8X9 ASYMMETRICAL HEARING LOSS: ICD-10-CM

## 2022-04-06 DIAGNOSIS — H73.20 MYRINGITIS: Primary | ICD-10-CM

## 2022-04-06 PROCEDURE — 92504 EAR MICROSCOPY EXAMINATION: CPT | Performed by: OTOLARYNGOLOGY

## 2022-04-06 PROCEDURE — 99203 OFFICE O/P NEW LOW 30 MIN: CPT | Performed by: OTOLARYNGOLOGY

## 2022-04-06 PROCEDURE — 92567 TYMPANOMETRY: CPT | Performed by: AUDIOLOGIST

## 2022-04-06 PROCEDURE — 92553 AUDIOMETRY AIR & BONE: CPT | Performed by: AUDIOLOGIST

## 2022-04-06 RX ORDER — FLUOXETINE HYDROCHLORIDE 40 MG/1
40 CAPSULE ORAL
COMMUNITY
Start: 2022-03-21

## 2022-04-06 RX ORDER — ATORVASTATIN CALCIUM 40 MG/1
80 TABLET, FILM COATED ORAL DAILY
COMMUNITY
Start: 2022-04-01

## 2022-04-06 RX ORDER — BIOTIN 1 MG
TABLET ORAL
COMMUNITY

## 2022-04-06 RX ORDER — NEOMYCIN SULFATE, POLYMYXIN B SULFATE, HYDROCORTISONE 3.5; 10000; 1 MG/ML; [USP'U]/ML; MG/ML
SOLUTION/ DROPS AURICULAR (OTIC)
COMMUNITY
Start: 2022-03-11

## 2022-04-06 RX ORDER — SENNOSIDES 8.6 MG
650 CAPSULE ORAL EVERY 8 HOURS PRN
COMMUNITY

## 2022-04-06 RX ORDER — CARVEDILOL 25 MG/1
25 TABLET ORAL
COMMUNITY
Start: 2022-03-21

## 2022-04-06 RX ORDER — LISINOPRIL 20 MG/1
20 TABLET ORAL DAILY
COMMUNITY
End: 2022-11-01

## 2022-04-06 RX ORDER — PANTOPRAZOLE SODIUM 40 MG/1
TABLET, DELAYED RELEASE ORAL
COMMUNITY
Start: 2022-03-30

## 2022-04-06 RX ORDER — CHOLESTYRAMINE 4 G/9G
POWDER, FOR SUSPENSION ORAL
COMMUNITY
Start: 2022-02-28

## 2022-04-06 RX ORDER — AMLODIPINE BESYLATE 10 MG/1
10 TABLET ORAL DAILY
COMMUNITY
Start: 2022-03-30

## 2022-04-06 RX ORDER — GABAPENTIN 300 MG/1
900 CAPSULE ORAL 2 TIMES DAILY
COMMUNITY
Start: 2022-03-07

## 2022-04-06 RX ORDER — TOPIRAMATE 25 MG/1
TABLET ORAL
COMMUNITY
Start: 2022-03-11 | End: 2022-11-01

## 2022-04-06 RX ORDER — CIPROFLOXACIN AND DEXAMETHASONE 3; 1 MG/ML; MG/ML
4 SUSPENSION/ DROPS AURICULAR (OTIC) 2 TIMES DAILY
Qty: 1 EACH | Refills: 0 | Status: SHIPPED | OUTPATIENT
Start: 2022-04-06 | End: 2022-04-20

## 2022-04-06 RX ORDER — CYCLOBENZAPRINE HCL 5 MG
5 TABLET ORAL
COMMUNITY
Start: 2022-03-22 | End: 2022-11-01

## 2022-04-06 RX ORDER — MONTELUKAST SODIUM 4 MG/1
1 TABLET, CHEWABLE ORAL 2 TIMES DAILY
COMMUNITY
End: 2022-11-01

## 2022-04-06 RX ORDER — FUROSEMIDE 20 MG/1
20 TABLET ORAL DAILY
COMMUNITY

## 2022-04-06 RX ORDER — RIVAROXABAN 20 MG/1
20 TABLET, FILM COATED ORAL DAILY
COMMUNITY
Start: 2022-03-21

## 2022-04-06 ASSESSMENT — ENCOUNTER SYMPTOMS
ALLERGIC/IMMUNOLOGIC NEGATIVE: 1
RESPIRATORY NEGATIVE: 1
EYES NEGATIVE: 1
GASTROINTESTINAL NEGATIVE: 1

## 2022-04-06 NOTE — PROGRESS NOTES
2022    Cici Izaguirre (:  1955) is a 73 y.o. female, Established patient, here for evaluation of the following chief complaint(s):  New Patient (left ear pain )      Vitals:    22 0926   BP: 124/66   Height: 5' 3\" (1.6 m)       Wt Readings from Last 3 Encounters:   No data found for Wt       BP Readings from Last 3 Encounters:   22 124/66         SUBJECTIVE/OBJECTIVE:    New patient seen today for her ears. She had Coban about a month and a half ago and since that time is having ear pain. It started on the left. She still has pain and pressure on the left and now she has some pressure on the right as well. Here today demonstrates bilateral sensorineural hearing loss which is fairly significant with a mixed component on the left. The pain is only on the left. She did have type a tympanograms bilaterally. She has been on neomycin drops and oral antibiotics which helped somewhat but not completely. Review of Systems   Constitutional: Negative. HENT: Positive for ear pain and hearing loss. Eyes: Negative. Respiratory: Negative. Cardiovascular: Negative. Gastrointestinal: Negative. Endocrine: Negative. Musculoskeletal: Negative. Skin: Negative. Allergic/Immunologic: Negative. Neurological: Negative. Hematological: Negative. Psychiatric/Behavioral: Negative. Physical Exam  Constitutional:       Appearance: Normal appearance. She is normal weight. HENT:      Head: Normocephalic and atraumatic. Right Ear: Ear canal and external ear normal.      Left Ear: Ear canal and external ear normal. Tympanic membrane is erythematous. Ears:      Comments: Dull TM right, abnormal TM left with a lobular appearance     Nose: Nose normal.      Mouth/Throat:      Mouth: Mucous membranes are moist.      Pharynx: Oropharynx is clear. Eyes:      Extraocular Movements: Extraocular movements intact.       Pupils: Pupils are equal, round, and reactive to light. Cardiovascular:      Rate and Rhythm: Normal rate and regular rhythm. Pulmonary:      Effort: Pulmonary effort is normal.      Breath sounds: Normal breath sounds. Musculoskeletal:      Cervical back: Normal range of motion. Skin:     General: Skin is warm and dry. Neurological:      General: No focal deficit present. Mental Status: She is alert and oriented to person, place, and time. Psychiatric:         Mood and Affect: Mood normal.         Behavior: Behavior normal.              ASSESSMENT/PLAN:    1. Myringitis  2. Mixed conductive and sensorineural hearing loss of both ears  3. Otalgia of left ear  Ciprodex drops for the left ear for the next 2 weeks. If we can calm this down and normalized that tympanic membrane I will plan on CT scan to evaluate this closer. Return in about 4 weeks (around 5/4/2022) for left ear. An electronic signature was used to authenticate this note. Марина Vasquez MD       Please note that this chart was generated using dragon dictation software. Although every effort was made to ensure the accuracy of this automated transcription, some errors in transcription may have occurred.

## 2022-04-06 NOTE — PROGRESS NOTES
History   Betsey Ng is a 73 y.o. female who presented to the clinic this date with complaints of left ear pain and decreased hearing. She reported onset in December following Covid. She reported pain is in her ear and goes down neck and up into head. She reported binaural aural fullness. She denied tinnitus. She reported imbalance. She currently wears binaural hearing aids. Summary   Tympanometry consistent with reduced TM mobility right and normal TM mobility left. Pure tone testing indicates moderate to moderately severe SNHL right and severe to profound SNHL left. Speech testing was not performed due to severity of left ear thresholds. Results   Otoscopy:    Right: Dull TM   Left: Abnormal TM    Audiometry:    Right: Moderate to moderatley severe sloping SNHL   Left: Profound to severe rising SNHL         Tympanometry:     Right: Type As   Left: Type A    Plan   Results of today's testing were discussed with Ms. Edinson Riley and the following recommendations were made:    1. Follow up with ENT as scheduled. 2. Recheck hearing following medical management.         Audiogram and Acoustic Immittance

## 2022-04-25 ENCOUNTER — APPOINTMENT (OUTPATIENT)
Dept: LAB | Facility: HOSPITAL | Age: 67
End: 2022-04-25

## 2022-05-04 ENCOUNTER — OFFICE VISIT (OUTPATIENT)
Dept: ENT CLINIC | Age: 67
End: 2022-05-04
Payer: MEDICARE

## 2022-05-04 VITALS
DIASTOLIC BLOOD PRESSURE: 82 MMHG | SYSTOLIC BLOOD PRESSURE: 150 MMHG | TEMPERATURE: 97.7 F | WEIGHT: 281 LBS | HEIGHT: 63 IN | BODY MASS INDEX: 49.79 KG/M2

## 2022-05-04 DIAGNOSIS — H91.8X9 ASYMMETRICAL HEARING LOSS: Primary | ICD-10-CM

## 2022-05-04 PROCEDURE — 99214 OFFICE O/P EST MOD 30 MIN: CPT | Performed by: OTOLARYNGOLOGY

## 2022-05-04 ASSESSMENT — ENCOUNTER SYMPTOMS
ALLERGIC/IMMUNOLOGIC NEGATIVE: 1
GASTROINTESTINAL NEGATIVE: 1
RESPIRATORY NEGATIVE: 1
EYES NEGATIVE: 1

## 2022-05-04 NOTE — PROGRESS NOTES
Kevin Bowling (:  1955) is a 73 y.o. female, Established patient, here for evaluation of the following chief complaint(s):  Follow-up (Myringitis / Left ear )      Vitals:    22 1003 22 1027   BP: (!) 170/94 (!) 150/82   Temp: 97.7 °F (36.5 °C)    Weight: 281 lb (127.5 kg)    Height: 5' 3\" (1.6 m)        Wt Readings from Last 3 Encounters:   22 281 lb (127.5 kg)   22 289 lb (131.1 kg)       BP Readings from Last 3 Encounters:   22 (!) 150/82   22 124/66         SUBJECTIVE/OBJECTIVE:    Patient seen today for her left ear. I placed her on drops at the last visit which did not help. She now says that the left ear does not seem to be working well at all. She wears a hearing aid but she only hears muffled sounds. Hearing test did not demonstrate significant asymmetry. This all happened soon after COVID. No Significant balance issues      Review of Systems   Constitutional: Negative. HENT: Positive for hearing loss. Eyes: Negative. Respiratory: Negative. Cardiovascular: Negative. Gastrointestinal: Negative. Endocrine: Negative. Musculoskeletal: Negative. Skin: Negative. Allergic/Immunologic: Negative. Neurological: Negative. Hematological: Negative. Psychiatric/Behavioral: Negative. Physical Exam  Vitals reviewed. Constitutional:       Appearance: Normal appearance. She is normal weight. HENT:      Head: Normocephalic and atraumatic. Right Ear: Tympanic membrane, ear canal and external ear normal.      Left Ear: Tympanic membrane, ear canal and external ear normal.      Nose: Nose normal.      Mouth/Throat:      Mouth: Mucous membranes are moist.      Pharynx: Oropharynx is clear. Eyes:      Extraocular Movements: Extraocular movements intact. Pupils: Pupils are equal, round, and reactive to light. Cardiovascular:      Rate and Rhythm: Normal rate and regular rhythm.    Pulmonary: Effort: Pulmonary effort is normal.      Breath sounds: Normal breath sounds. Musculoskeletal:      Cervical back: Normal range of motion. Skin:     General: Skin is warm and dry. Neurological:      General: No focal deficit present. Mental Status: She is alert and oriented to person, place, and time. Psychiatric:         Mood and Affect: Mood normal.         Behavior: Behavior normal.              ASSESSMENT/PLAN:    1. Asymmetrical hearing loss  -     MRI INNER AUDITORY CANALS / POSTERIOR FOSSA W CONTRAST; Future  Given the significant asymmetry on negative MRI to see if he can find out the reason why the left ear is worse than the other    Return in about 3 weeks (around 5/25/2022) for Follow-up after MRI. An electronic signature was used to authenticate this note. Rhett Kennedy MD       Please note that this chart was generated using dragon dictation software. Although every effort was made to ensure the accuracy of this automated transcription, some errors in transcription may have occurred.

## 2022-05-06 ENCOUNTER — HOSPITAL ENCOUNTER (OUTPATIENT)
Dept: MRI IMAGING | Age: 67
Discharge: HOME | End: 2022-05-06
Payer: MEDICARE

## 2022-05-06 DIAGNOSIS — H91.8X9 ASYMMETRICAL HEARING LOSS: Primary | ICD-10-CM

## 2022-05-06 DIAGNOSIS — H91.8X9 ASYMMETRICAL HEARING LOSS: ICD-10-CM

## 2022-05-06 PROCEDURE — 6360000004 HC RX CONTRAST MEDICATION: Performed by: OTOLARYNGOLOGY

## 2022-05-06 PROCEDURE — A9585 GADOBUTROL INJECTION: HCPCS | Performed by: OTOLARYNGOLOGY

## 2022-05-06 PROCEDURE — 70553 MRI BRAIN STEM W/O & W/DYE: CPT

## 2022-05-06 RX ADMIN — GADOBUTROL 3 ML: 604.72 INJECTION INTRAVENOUS at 15:57

## 2022-05-06 RX ADMIN — GADOBUTROL 10 ML: 604.72 INJECTION INTRAVENOUS at 15:57

## 2022-05-13 ENCOUNTER — APPOINTMENT (OUTPATIENT)
Dept: LAB | Facility: HOSPITAL | Age: 67
End: 2022-05-13

## 2022-05-13 ENCOUNTER — TELEPHONE (OUTPATIENT)
Dept: ONCOLOGY | Facility: CLINIC | Age: 67
End: 2022-05-13

## 2022-05-13 NOTE — TELEPHONE ENCOUNTER
Caller: SOLITARIO TAVERAS    Relationship to patient: SELF    Best call back number: [phone]    Chief complaint: PT CALLED BACK TO R/S APPT. UNABLE TO R/S WITHIN HUB TIMEFRAME    Type of visit: LAB AND FOLLOW UP    Requested date: NEXT AVAILABLE    If rescheduling, when is the original appointment: 05/13/22

## 2022-05-16 DIAGNOSIS — D63.1 ANEMIA IN STAGE 3 CHRONIC KIDNEY DISEASE, UNSPECIFIED WHETHER STAGE 3A OR 3B CKD: Primary | ICD-10-CM

## 2022-05-16 DIAGNOSIS — N18.30 ANEMIA IN STAGE 3 CHRONIC KIDNEY DISEASE, UNSPECIFIED WHETHER STAGE 3A OR 3B CKD: Primary | ICD-10-CM

## 2022-05-18 ENCOUNTER — LAB (OUTPATIENT)
Dept: LAB | Facility: HOSPITAL | Age: 67
End: 2022-05-18

## 2022-05-18 ENCOUNTER — OFFICE VISIT (OUTPATIENT)
Dept: ONCOLOGY | Facility: CLINIC | Age: 67
End: 2022-05-18

## 2022-05-18 VITALS
DIASTOLIC BLOOD PRESSURE: 88 MMHG | WEIGHT: 279.2 LBS | RESPIRATION RATE: 16 BRPM | SYSTOLIC BLOOD PRESSURE: 158 MMHG | HEART RATE: 82 BPM | TEMPERATURE: 97.6 F | OXYGEN SATURATION: 98 % | BODY MASS INDEX: 49.47 KG/M2 | HEIGHT: 63 IN

## 2022-05-18 DIAGNOSIS — D63.1 ANEMIA IN STAGE 3 CHRONIC KIDNEY DISEASE, UNSPECIFIED WHETHER STAGE 3A OR 3B CKD: Primary | ICD-10-CM

## 2022-05-18 DIAGNOSIS — D50.9 IRON DEFICIENCY ANEMIA, UNSPECIFIED IRON DEFICIENCY ANEMIA TYPE: ICD-10-CM

## 2022-05-18 DIAGNOSIS — N18.30 ANEMIA IN STAGE 3 CHRONIC KIDNEY DISEASE, UNSPECIFIED WHETHER STAGE 3A OR 3B CKD: Primary | ICD-10-CM

## 2022-05-18 DIAGNOSIS — D61.818 PANCYTOPENIA: Primary | ICD-10-CM

## 2022-05-18 DIAGNOSIS — N18.31 STAGE 3A CHRONIC KIDNEY DISEASE: ICD-10-CM

## 2022-05-18 DIAGNOSIS — K42.9 UMBILICAL HERNIA WITHOUT OBSTRUCTION AND WITHOUT GANGRENE: ICD-10-CM

## 2022-05-18 LAB
ALBUMIN SERPL-MCNC: 4.4 G/DL (ref 3.5–5.2)
ALBUMIN/GLOB SERPL: 1.6 G/DL
ALP SERPL-CCNC: 111 U/L (ref 39–117)
ALT SERPL W P-5'-P-CCNC: 19 U/L (ref 1–33)
ANION GAP SERPL CALCULATED.3IONS-SCNC: 11 MMOL/L (ref 5–15)
AST SERPL-CCNC: 21 U/L (ref 1–32)
BASOPHILS # BLD AUTO: 0.03 10*3/MM3 (ref 0–0.2)
BASOPHILS NFR BLD AUTO: 0.7 % (ref 0–1.5)
BILIRUB SERPL-MCNC: 0.7 MG/DL (ref 0–1.2)
BUN SERPL-MCNC: 19 MG/DL (ref 8–23)
BUN/CREAT SERPL: 17.3 (ref 7–25)
CALCIUM SPEC-SCNC: 9.8 MG/DL (ref 8.6–10.5)
CHLORIDE SERPL-SCNC: 109 MMOL/L (ref 98–107)
CO2 SERPL-SCNC: 22 MMOL/L (ref 22–29)
CREAT SERPL-MCNC: 1.1 MG/DL (ref 0.57–1)
DEPRECATED RDW RBC AUTO: 46.6 FL (ref 37–54)
EGFRCR SERPLBLD CKD-EPI 2021: 55.5 ML/MIN/1.73
EOSINOPHIL # BLD AUTO: 0.09 10*3/MM3 (ref 0–0.4)
EOSINOPHIL NFR BLD AUTO: 2.2 % (ref 0.3–6.2)
ERYTHROCYTE [DISTWIDTH] IN BLOOD BY AUTOMATED COUNT: 13.1 % (ref 12.3–15.4)
FERRITIN SERPL-MCNC: 172.1 NG/ML (ref 13–150)
FOLATE SERPL-MCNC: 13.7 NG/ML (ref 4.78–24.2)
GLOBULIN UR ELPH-MCNC: 2.7 GM/DL
GLUCOSE SERPL-MCNC: 129 MG/DL (ref 65–99)
HCT VFR BLD AUTO: 39 % (ref 34–46.6)
HGB BLD-MCNC: 12.2 G/DL (ref 12–15.9)
HOLD SPECIMEN: NORMAL
IMM GRANULOCYTES # BLD AUTO: 0.01 10*3/MM3 (ref 0–0.05)
IMM GRANULOCYTES NFR BLD AUTO: 0.2 % (ref 0–0.5)
IRON 24H UR-MRATE: 64 MCG/DL (ref 37–145)
IRON SATN MFR SERPL: 14 % (ref 20–50)
LYMPHOCYTES # BLD AUTO: 1.11 10*3/MM3 (ref 0.7–3.1)
LYMPHOCYTES NFR BLD AUTO: 26.6 % (ref 19.6–45.3)
MCH RBC QN AUTO: 30 PG (ref 26.6–33)
MCHC RBC AUTO-ENTMCNC: 31.3 G/DL (ref 31.5–35.7)
MCV RBC AUTO: 96.1 FL (ref 79–97)
MONOCYTES # BLD AUTO: 0.49 10*3/MM3 (ref 0.1–0.9)
MONOCYTES NFR BLD AUTO: 11.7 % (ref 5–12)
NEUTROPHILS NFR BLD AUTO: 2.45 10*3/MM3 (ref 1.7–7)
NEUTROPHILS NFR BLD AUTO: 58.6 % (ref 42.7–76)
NRBC BLD AUTO-RTO: 0 /100 WBC (ref 0–0.2)
PLATELET # BLD AUTO: 187 10*3/MM3 (ref 140–450)
PMV BLD AUTO: 10.2 FL (ref 6–12)
POTASSIUM SERPL-SCNC: 4 MMOL/L (ref 3.5–5.2)
PROT SERPL-MCNC: 7.1 G/DL (ref 6–8.5)
RBC # BLD AUTO: 4.06 10*6/MM3 (ref 3.77–5.28)
SODIUM SERPL-SCNC: 142 MMOL/L (ref 136–145)
TIBC SERPL-MCNC: 450 MCG/DL (ref 298–536)
TRANSFERRIN SERPL-MCNC: 302 MG/DL (ref 200–360)
VIT B12 BLD-MCNC: 710 PG/ML (ref 211–946)
WBC NRBC COR # BLD: 4.18 10*3/MM3 (ref 3.4–10.8)

## 2022-05-18 PROCEDURE — 82728 ASSAY OF FERRITIN: CPT

## 2022-05-18 PROCEDURE — 80053 COMPREHEN METABOLIC PANEL: CPT

## 2022-05-18 PROCEDURE — 82746 ASSAY OF FOLIC ACID SERUM: CPT

## 2022-05-18 PROCEDURE — 82607 VITAMIN B-12: CPT

## 2022-05-18 PROCEDURE — 83540 ASSAY OF IRON: CPT

## 2022-05-18 PROCEDURE — 84466 ASSAY OF TRANSFERRIN: CPT

## 2022-05-18 PROCEDURE — 36415 COLL VENOUS BLD VENIPUNCTURE: CPT

## 2022-05-18 PROCEDURE — 99213 OFFICE O/P EST LOW 20 MIN: CPT | Performed by: NURSE PRACTITIONER

## 2022-05-18 PROCEDURE — 85025 COMPLETE CBC W/AUTO DIFF WBC: CPT

## 2022-05-18 NOTE — PROGRESS NOTES
"[facility] ON[facility] GROUP HEMATOLOGY & ONCOLOGY  [address]  [place] [address] [phone]    Patient Name: Mariia Parker  Encounter Date: 05/18/2022  YOB: 1955  Patient Number: [id number]    Hematology / Oncology Progress Note    CHIEF COMPLAINT:  Follow up for pancytopenia        REASON FOR VISIT: Ms. 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 She is also seen for multifactoral anemia from iron deficiency, hypersplenism and CKD stage III. She was intolerant to oral iron therapy and was switched to Nulecit in August 2019. She has not required iron replacement since.    PREVIOUS INTERVENTIONS:   1. 2 units PRBCs 10/13/2016 at [facility].  2. Procrit 40,000 units subcutaneously 10/13/2016 through present at [facility]. Details not available.  3. Ferrous sulfate 325 mg 07/20/2017 through 01/22/2018. Resume 05/21/2018 through 09/17/2018. Resumed 11/21/2018 through 12/06/2018. Resume 05/13/2019.  4. Nulecit 125mg weekly x3 8/13/19, 8/20/19, 8/27/19      She presents today feeling well. She has no complaints except for being \"tired all the time.\" She has had no GI upset, fever or shortness of breath.  She is scheduled to see a provider in [place], [place] for possible repair of abdominal hernia.     Patient states she has changed her eating habits and as a result, her weight in September 2021 was 309#.  Today she weight 279#.       Labs today show her hgb to be stable at 12.2, Hct 39, MCV 96.1, plts of 187 and MCV of 96.1.  Serum iron 64, TIBC 450, Ferritin was at 172 and iron saturation of 14%. CMP showed an GFR of 55 ml/min.  BUN 19, Creatinine 1.1      LABS    Lab Results - Last 18 Months   Lab Units 05/18/22  1041 09/23/21  0909 03/25/21  0903 11/19/20  0853 "   HEMOGLOBIN g/dL 12.2 11.4* 11.9* 11.7*   HEMATOCRIT % 39.0 36.7 37.5 36.7   MCV fL 96.1 98.1* 96.4 95.8   WBC 10*3/mm3 4.18 4.77 5.33 4.99   RDW % 13.1 12.2* 12.3 12.0*   MPV fL 10.2 9.1 9.6 9.2   PLATELETS 10*3/mm3 187 174 192 162   IMM GRAN % % 0.2 0.6* 0.4 0.2   NEUTROS ABS 10*3/mm3 2.45 3.02 3.39 3.10   LYMPHS ABS 10*3/mm3 1.11 1.19 1.32 1.18   MONOS ABS 10*3/mm3 0.49 0.43 0.47 0.50   EOS ABS 10*3/mm3 0.09 0.08 0.09 0.18   BASOS ABS 10*3/mm3 0.03 0.02 0.04 0.02   IMMATURE GRANS (ABS) 10*3/mm3 0.01 0.03 0.02 0.01   NRBC /100 WBC 0.0  --   --   --        Lab Results - Last 18 Months   Lab Units 05/18/22  1041 09/23/21  0909 03/25/21  0903 11/19/20  0853   GLUCOSE mg/dL 129* 202* 163* 158*   SODIUM mmol/L 142 142 142 141   POTASSIUM mmol/L 4.0 5.2 4.3 4.4   CO2 mmol/L 22.0 26.0 27.0 26.0   CHLORIDE mmol/L 109* 105 104 106   ANION GAP mmol/L 11.0 11.0 11.0 9.0   CREATININE mg/dL 1.10* 1.12* 1.00 1.08*   BUN mg/dL 19 25* 23 33*   BUN / CREAT RATIO  17.3 22.3 23.0 30.6*   CALCIUM mg/dL 9.8 9.8 9.5 9.5   EGFR IF NONAFRICN AM mL/min/1.73  --  49* 56* 51*   ALK PHOS U/L 111 150* 149* 145*   TOTAL PROTEIN g/dL 7.1 7.6 7.5 7.4   ALT (SGPT) U/L 19 25 24 20   AST (SGOT) U/L 21 25 24 20   BILIRUBIN mg/dL 0.7 0.6 0.6 0.6   ALBUMIN g/dL 4.40 4.40 4.20 4.30   GLOBULIN gm/dL 2.7 3.2 3.3 3.1       No results for input(s): MSPIKE, KAPPALAMB, IGLFLC, URICACID, FREEKAPPAL, CEA, LDH, REFLABREPO in the last 92917 hours.    Lab Results - Last 18 Months   Lab Units 05/18/22  1041 09/23/21  0909 03/25/21  0903 11/19/20  0853   IRON mcg/dL 64 77 69 60   TIBC mcg/dL 450 474 462 438   IRON SATURATION % 14* 16* 15* 14*   FERRITIN ng/mL 172.10* 174.10* 196.70* 212.90*   FOLATE ng/mL  --  16.70 8.17 7.96         PAST MEDICAL HISTORY:  ALLERGIES:  No Known Allergies  CURRENT MEDICATIONS:  Outpatient Encounter Medications as of 5/18/2022   Medication Sig Dispense Refill   • acetaminophen (TYLENOL) 650 MG 8 hr tablet Take 650 mg by mouth Every 8  (Eight) Hours As Needed for Mild Pain .     • amLODIPine (NORVASC) 10 MG tablet Take 10 mg by mouth Daily.     • atorvastatin (LIPITOR) 40 MG tablet Take 40 mg by mouth Daily.     • carvedilol (COREG) 25 MG tablet Take 25 mg by mouth 2 (Two) Times a Day With Meals.     • colestipol (COLESTID) 1 g tablet Take 1 g by mouth 2 (Two) Times a Day.     • FLUoxetine (PROzac) 20 MG capsule Take 40 mg by mouth Daily.     • furosemide (LASIX) 20 MG tablet Take 20 mg by mouth Daily. 1 or 2 tablets daily     • gabapentin (NEURONTIN) 300 MG capsule Take 900 mg by mouth 2 (Two) Times a Day.     • linagliptin (TRADJENTA) 5 MG tablet tablet Take 5 mg by mouth Daily.     • lisinopril (PRINIVIL,ZESTRIL) 20 MG tablet Take 20 mg by mouth Daily.     • Multiple Vitamins-Minerals (MULTIVITAMIN ADULT PO) Take  by mouth.     • pantoprazole (PROTONIX) 40 MG EC tablet Take 40 mg by mouth Daily.     • VITAMIN D, ERGOCALCIFEROL, PO Take  by mouth Daily.     • Wheat Dextrin (BENEFIBER PO) Take  by mouth As Needed.     • XARELTO 20 MG tablet 20 mg Daily With Dinner.       No facility-administered encounter medications on file as of 5/18/2022.     ADULT ILLNESSES:  Patient Active Problem List   Diagnosis Code   • Body mass index (BMI) of 50-59.9 in adult ([facility]) Z68.43   • Stage 3 chronic kidney disease (HCC) N18.30   • Pancytopenia ([facility]) D61.818   • Hypersplenism D73.1   • Hypertension I10   • Non-alcoholic fatty liver disease K76.0   • Anemia in stage 3 chronic kidney disease (HCC) N18.30, D63.1     SURGERIES:  Past Surgical History:   Procedure Laterality Date   • CARPAL TUNNEL RELEASE Right    • ENDOSCOPY AND COLONOSCOPY  03/2016   • FOREIGN BODY REMOVAL Right     leg   • HYSTERECTOMY      open; total   • LAPAROSCOPIC CHOLECYSTECTOMY     • SMALL INTESTINE SURGERY  06/2016   • UMBILICAL HERNIA REPAIR      not sure if has mesh in there or not (x4 surgeries)      HEALTH MAINTENANCE ITEMS:  Health Maintenance Due   Topic Date Due   • DXA SCAN  Never  "done   • COLORECTAL CANCER SCREENING  Never done   • COVID-19 Vaccine (1) Never done   • Pneumococcal Vaccine 65+ (1 - PCV) Never done   • ZOSTER VACCINE (1 of 2) Never done   • TDAP/TD VACCINES (2 - Tdap) 11/07/2007   • HEPATITIS C SCREENING  Never done   • ANNUAL WELLNESS VISIT  Never done   • PAP SMEAR  Never done   • LIPID PANEL  Never done   • MAMMOGRAM  10/26/2018       <no information>  Last Completed Colonoscopy     This patient has no relevant Health Maintenance data.          There is no immunization history on file for this patient.  Last Completed Mammogram     This patient has no relevant Health Maintenance data.            FAMILY HISTORY:  Family History   Problem Relation Age of Onset   • Cancer Mother    • Hypertension Mother    • Heart disease Mother    • Breast cancer Mother    • Dementia Mother    • Parkinsonism Mother    • No Known Problems Father    • Hypertension Brother    • Other Maternal Grandmother         brain tumor   • No Known Problems Maternal Grandfather    • No Known Problems Paternal Grandmother    • No Known Problems Paternal Grandfather    • No Known Problems Brother      SOCIAL HISTORY:  Social History     Socioeconomic History   • Marital status:    Tobacco Use   • Smoking status: Never Smoker   • Smokeless tobacco: Never Used   Substance and Sexual Activity   • Alcohol use: No   • Drug use: No   • Sexual activity: Defer     Birth control/protection: Surgical       REVIEW OF SYSTEMS:  Review of Systems    /88   Pulse 82   Temp 97.6 °F (36.4 °C) (Temporal)   Resp 16   Ht 160 cm (63\")   Wt 127 kg (279 lb 3.2 oz)   SpO2 98%   Breastfeeding No   BMI 49.46 kg/m²  Body surface area is 2.23 meters squared.    Pain Score    05/18/22 1047   PainSc: 0-No pain       Physical Exam:  Physical Exam    Mariia Parker reports a pain score of 0. No intervention indicated.      ASSESSMENT / PLAN    1. Pancytopenia (HCC)    2. Iron deficiency anemia, unspecified iron " deficiency anemia type    3. Stage 3a chronic kidney disease (HCC)    4. Umbilical hernia without obstruction and without gangrene           ASSESSMENT:    1. Pancytopenia / Anemia  / Iron Deficiency / CKD   -Pt has multifactorial Anemia with intermittent iron deficiency and chronic kidney disease Stage III,   - Hgb 12.2, Hct 39, MCV 96.1, plts of 187 and MCV of 96.1.   -   Serum iron 64, TIBC 450, Ferritin was at 172 and iron saturation of 14%.   -CMP showed GFR of 55 ml/min.  BUN 19, Creatinine 1.1    - Non-alcoholic fatty liver disease; LFTs normal, ALKP stablet 149.  - Large umbilical hernia, chronic. Nontender and soft.  Scheduled to see surgeon in [place], [place].   - Has been on B12 supplementation with B12 tablets and continues on these.  -Last mammogram was October 10, 2021 and was normal     PLAN:   1. Reviewed available lab work results with the patient today and will contact the patient with the pending studies as available.    2. Continue B12 1000 mcg tablet daily  4. Will plan Procrit 40,000 units subcutaneously weekly if hemoglobin becomes less than 10 and hematocrit less than 30 to target a hemoglobin of 11 and hematocrit of 33%. Move CBC to weekly if she starts Procrit. She currently does not meed this criteria.   5. Continue ongoing management per primary care physician and other specialists.   6. Plan of care discussed with patient. Understanding expressed. Patient agreeable to proceed.     8. Return to office in 6 months for follow up and preoffice cbc, cmp, iron profile and ferritin, B12 and folate.  Advised that there is a possibility that we will be seeing patients in the [place] office by that time.       I spent 30 minutes caring for Mariia on this date of service. This time includes time spent by me in the following activities: preparing for the visit, reviewing tests, performing a medically appropriate examination and/or evaluation, counseling and educating the patient/family/caregiver,  ordering medications, tests, or procedures and documenting information in the medical record.

## 2022-05-25 ENCOUNTER — OFFICE VISIT (OUTPATIENT)
Dept: ENT CLINIC | Age: 67
End: 2022-05-25
Payer: MEDICARE

## 2022-05-25 VITALS
DIASTOLIC BLOOD PRESSURE: 89 MMHG | HEIGHT: 63 IN | BODY MASS INDEX: 49.96 KG/M2 | SYSTOLIC BLOOD PRESSURE: 135 MMHG | WEIGHT: 282 LBS

## 2022-05-25 DIAGNOSIS — H92.02 OTALGIA, LEFT: ICD-10-CM

## 2022-05-25 DIAGNOSIS — H90.3 ASYMMETRIC SNHL (SENSORINEURAL HEARING LOSS): Primary | ICD-10-CM

## 2022-05-25 PROCEDURE — 1123F ACP DISCUSS/DSCN MKR DOCD: CPT | Performed by: OTOLARYNGOLOGY

## 2022-05-25 PROCEDURE — 99213 OFFICE O/P EST LOW 20 MIN: CPT | Performed by: OTOLARYNGOLOGY

## 2022-05-25 RX ORDER — LEVOCETIRIZINE DIHYDROCHLORIDE 5 MG/1
5 TABLET, FILM COATED ORAL NIGHTLY
Qty: 30 TABLET | Refills: 1 | Status: SHIPPED | OUTPATIENT
Start: 2022-05-25 | End: 2022-07-20

## 2022-05-25 ASSESSMENT — ENCOUNTER SYMPTOMS
RESPIRATORY NEGATIVE: 1
ALLERGIC/IMMUNOLOGIC NEGATIVE: 1
GASTROINTESTINAL NEGATIVE: 1
EYES NEGATIVE: 1

## 2022-05-25 NOTE — PROGRESS NOTES
2022    Yoselyn Sanz (:  1955) is a 78 y.o. female, Established patient, here for evaluation of the following chief complaint(s):  Follow-up (MRI f/u)      Vitals:    22 0950   BP: 135/89   Weight: 282 lb (127.9 kg)   Height: 5' 3\" (1.6 m)       Wt Readings from Last 3 Encounters:   22 282 lb (127.9 kg)   22 281 lb (127.5 kg)   22 289 lb (131.1 kg)       BP Readings from Last 3 Encounters:   22 135/89   22 (!) 150/82   22 124/66         SUBJECTIVE/OBJECTIVE:    Patient seen today for her left ear. I ordered an MRI which was normal.  She has significant asymmetry between left and right and some ear pain on the left as well. I placed her on eardrops previously and it did not seem to help. She still some discomfort on that side with decreased hearing on that side. Review of Systems   Constitutional: Negative. HENT: Positive for ear pain and hearing loss. Eyes: Negative. Respiratory: Negative. Cardiovascular: Negative. Gastrointestinal: Negative. Endocrine: Negative. Musculoskeletal: Negative. Skin: Negative. Allergic/Immunologic: Negative. Neurological: Negative. Hematological: Negative. Psychiatric/Behavioral: Negative. Physical Exam  Vitals reviewed. Constitutional:       Appearance: Normal appearance. She is normal weight. HENT:      Head: Normocephalic and atraumatic. Right Ear: Tympanic membrane, ear canal and external ear normal.      Left Ear: Tympanic membrane, ear canal and external ear normal.      Nose: Nose normal.      Mouth/Throat:      Mouth: Mucous membranes are moist.      Pharynx: Oropharynx is clear. Eyes:      Extraocular Movements: Extraocular movements intact. Pupils: Pupils are equal, round, and reactive to light. Cardiovascular:      Rate and Rhythm: Normal rate and regular rhythm.    Pulmonary:      Effort: Pulmonary effort is normal.      Breath sounds: Normal breath sounds. Musculoskeletal:      Cervical back: Normal range of motion. Skin:     General: Skin is warm and dry. Neurological:      General: No focal deficit present. Mental Status: She is alert and oriented to person, place, and time. Psychiatric:         Mood and Affect: Mood normal.         Behavior: Behavior normal.              ASSESSMENT/PLAN:    1. Asymmetric SNHL (sensorineural hearing loss)  2. Otalgia, left  Started on an antihistamine to see if this helps calm things down a little bit. Like see her back in a month. Return in about 4 weeks (around 6/22/2022) for Left hearing loss and ear pain. An electronic signature was used to authenticate this note. Raleigh Rodriguez MD       Please note that this chart was generated using dragon dictation software. Although every effort was made to ensure the accuracy of this automated transcription, some errors in transcription may have occurred.

## 2022-06-27 ENCOUNTER — OFFICE VISIT (OUTPATIENT)
Dept: ENT CLINIC | Age: 67
End: 2022-06-27
Payer: MEDICARE

## 2022-06-27 VITALS
SYSTOLIC BLOOD PRESSURE: 128 MMHG | HEIGHT: 63 IN | WEIGHT: 278 LBS | DIASTOLIC BLOOD PRESSURE: 78 MMHG | BODY MASS INDEX: 49.26 KG/M2

## 2022-06-27 DIAGNOSIS — H90.6 MIXED HEARING LOSS, BILATERAL: Primary | ICD-10-CM

## 2022-06-27 DIAGNOSIS — H69.83 DYSFUNCTION OF BOTH EUSTACHIAN TUBES: ICD-10-CM

## 2022-06-27 PROCEDURE — 99213 OFFICE O/P EST LOW 20 MIN: CPT | Performed by: OTOLARYNGOLOGY

## 2022-06-27 PROCEDURE — 1123F ACP DISCUSS/DSCN MKR DOCD: CPT | Performed by: OTOLARYNGOLOGY

## 2022-06-27 RX ORDER — AZELASTINE 1 MG/ML
2 SPRAY, METERED NASAL 2 TIMES DAILY
Qty: 60 ML | Refills: 2 | Status: SHIPPED | OUTPATIENT
Start: 2022-06-27 | End: 2022-11-01

## 2022-06-27 ASSESSMENT — ENCOUNTER SYMPTOMS
EYES NEGATIVE: 1
ALLERGIC/IMMUNOLOGIC NEGATIVE: 1
GASTROINTESTINAL NEGATIVE: 1
RESPIRATORY NEGATIVE: 1

## 2022-06-27 NOTE — PROGRESS NOTES
2022    Kenji Swanson (:  1955) is a 78 y.o. female, Established patient, here for evaluation of the following chief complaint(s):  Follow-up (left hearing loss and ear pain)      Vitals:    22 1056   BP: 128/78   Weight: 278 lb (126.1 kg)   Height: 5' 3\" (1.6 m)       Wt Readings from Last 3 Encounters:   22 278 lb (126.1 kg)   22 282 lb (127.9 kg)   22 281 lb (127.5 kg)       BP Readings from Last 3 Encounters:   22 128/78   22 135/89   22 (!) 150/82         SUBJECTIVE/OBJECTIVE:    Patient seen today for her ears. I placed her on an antihistamine to try to help the pressure in her ears but she says it did not help. She says some days are good and some days are bad. She says occasionally feels like her eardrums are going to burst.  Left side is always worse than the right. Review of Systems   Constitutional: Negative. HENT: Positive for hearing loss. Eyes: Negative. Respiratory: Negative. Cardiovascular: Negative. Gastrointestinal: Negative. Endocrine: Negative. Musculoskeletal: Negative. Skin: Negative. Allergic/Immunologic: Negative. Neurological: Negative. Hematological: Negative. Psychiatric/Behavioral: Negative. Physical Exam  Vitals reviewed. Constitutional:       Appearance: Normal appearance. She is normal weight. HENT:      Head: Normocephalic and atraumatic. Right Ear: Tympanic membrane, ear canal and external ear normal.      Left Ear: Tympanic membrane, ear canal and external ear normal.      Nose: Nose normal.      Mouth/Throat:      Mouth: Mucous membranes are moist.      Pharynx: Oropharynx is clear. Eyes:      Extraocular Movements: Extraocular movements intact. Pupils: Pupils are equal, round, and reactive to light. Cardiovascular:      Rate and Rhythm: Normal rate and regular rhythm.    Pulmonary:      Effort: Pulmonary effort is normal.      Breath sounds: Normal breath sounds. Musculoskeletal:      Cervical back: Normal range of motion. Skin:     General: Skin is warm and dry. Neurological:      General: No focal deficit present. Mental Status: She is alert and oriented to person, place, and time. Psychiatric:         Mood and Affect: Mood normal.         Behavior: Behavior normal.              ASSESSMENT/PLAN:    1. Mixed hearing loss, bilateral  2. Dysfunction of both eustachian tubes  We will try nasal antihistamine to see if this can help open up eustachian tubes and normalize things. Would like to see her back in a month. Return in about 4 weeks (around 7/25/2022) for Ears. An electronic signature was used to authenticate this note. Ellie Cartagena MD       Please note that this chart was generated using dragon dictation software. Although every effort was made to ensure the accuracy of this automated transcription, some errors in transcription may have occurred.

## 2022-07-20 RX ORDER — LEVOCETIRIZINE DIHYDROCHLORIDE 5 MG/1
TABLET, FILM COATED ORAL
Qty: 30 TABLET | Refills: 1 | Status: SHIPPED | OUTPATIENT
Start: 2022-07-20 | End: 2022-11-01

## 2022-09-13 ENCOUNTER — TELEPHONE (OUTPATIENT)
Dept: NEUROLOGY | Age: 67
End: 2022-09-13

## 2022-09-13 NOTE — TELEPHONE ENCOUNTER
Contacted pt to schedule new pt referral appt. Pt verbally understood date/time and location of appt. Also mailed out a welcome letter today. Pt stated that she wanted to see Dr. Yael Colindres and understood that it would be a couple months before getting an appt with him. Pt verbally understood.

## 2022-09-29 ENCOUNTER — OFFICE VISIT (OUTPATIENT)
Dept: ENT CLINIC | Age: 67
End: 2022-09-29
Payer: MEDICARE

## 2022-09-29 VITALS
BODY MASS INDEX: 48.73 KG/M2 | DIASTOLIC BLOOD PRESSURE: 86 MMHG | SYSTOLIC BLOOD PRESSURE: 138 MMHG | WEIGHT: 275 LBS | HEIGHT: 63 IN

## 2022-09-29 DIAGNOSIS — H90.6 MIXED HEARING LOSS, BILATERAL: ICD-10-CM

## 2022-09-29 DIAGNOSIS — H69.83 DYSFUNCTION OF BOTH EUSTACHIAN TUBES: Primary | ICD-10-CM

## 2022-09-29 PROCEDURE — 1123F ACP DISCUSS/DSCN MKR DOCD: CPT | Performed by: OTOLARYNGOLOGY

## 2022-09-29 PROCEDURE — 99213 OFFICE O/P EST LOW 20 MIN: CPT | Performed by: OTOLARYNGOLOGY

## 2022-09-29 ASSESSMENT — ENCOUNTER SYMPTOMS
ALLERGIC/IMMUNOLOGIC NEGATIVE: 1
GASTROINTESTINAL NEGATIVE: 1
EYES NEGATIVE: 1
RESPIRATORY NEGATIVE: 1

## 2022-09-29 NOTE — PROGRESS NOTES
2022    Lilibeth Luna (:  1955) is a 78 y.o. female, Established patient, here for evaluation of the following chief complaint(s):  Follow-up (Ears)      Vitals:    22 0938   BP: 138/86   Weight: 275 lb (124.7 kg)   Height: 5' 3\" (1.6 m)       Wt Readings from Last 3 Encounters:   22 275 lb (124.7 kg)   22 278 lb (126.1 kg)   22 282 lb (127.9 kg)       BP Readings from Last 3 Encounters:   22 138/86   22 128/78   22 135/89         SUBJECTIVE/OBJECTIVE:    She is seen today for her ears. I had her on medications and she still complains of significant pressure ears. She does have mixed loss but she feels like her head is going to explode through her ears. No sprays and oral medications of help. Review of Systems   Constitutional: Negative. HENT:  Positive for hearing loss. Ear pressure   Eyes: Negative. Respiratory: Negative. Cardiovascular: Negative. Gastrointestinal: Negative. Endocrine: Negative. Musculoskeletal: Negative. Skin: Negative. Allergic/Immunologic: Negative. Neurological: Negative. Hematological: Negative. Psychiatric/Behavioral: Negative. Physical Exam  Vitals reviewed. Constitutional:       Appearance: Normal appearance. She is normal weight. HENT:      Head: Normocephalic and atraumatic. Right Ear: Tympanic membrane, ear canal and external ear normal.      Left Ear: Tympanic membrane, ear canal and external ear normal.      Nose: Nose normal.      Mouth/Throat:      Mouth: Mucous membranes are moist.      Pharynx: Oropharynx is clear. Eyes:      Extraocular Movements: Extraocular movements intact. Pupils: Pupils are equal, round, and reactive to light. Cardiovascular:      Rate and Rhythm: Normal rate and regular rhythm. Pulmonary:      Effort: Pulmonary effort is normal.      Breath sounds: Normal breath sounds.    Musculoskeletal:      Cervical back: Normal range of motion. Skin:     General: Skin is warm and dry. Neurological:      General: No focal deficit present. Mental Status: She is alert and oriented to person, place, and time. Psychiatric:         Mood and Affect: Mood normal.         Behavior: Behavior normal.            ASSESSMENT/PLAN:    1. Dysfunction of both eustachian tubes  2. Mixed hearing loss, bilateral  Plan for bilateral myringotomies next week. Return in 6 days (on 10/5/2022) for 115 for myringotomy. .    An electronic signature was used to authenticate this note. Gregoria Munoz MD       Please note that this chart was generated using dragon dictation software. Although every effort was made to ensure the accuracy of this automated transcription, some errors in transcription may have occurred.

## 2022-10-05 ENCOUNTER — PROCEDURE VISIT (OUTPATIENT)
Dept: ENT CLINIC | Age: 67
End: 2022-10-05
Payer: MEDICARE

## 2022-10-05 VITALS
WEIGHT: 275 LBS | SYSTOLIC BLOOD PRESSURE: 126 MMHG | BODY MASS INDEX: 48.73 KG/M2 | HEIGHT: 63 IN | DIASTOLIC BLOOD PRESSURE: 78 MMHG

## 2022-10-05 DIAGNOSIS — H90.3 SENSORINEURAL HEARING LOSS (SNHL) OF BOTH EARS: ICD-10-CM

## 2022-10-05 DIAGNOSIS — H69.83 DYSFUNCTION OF BOTH EUSTACHIAN TUBES: Primary | ICD-10-CM

## 2022-10-05 PROCEDURE — 69420 INCISION OF EARDRUM: CPT | Performed by: OTOLARYNGOLOGY

## 2022-10-05 PROCEDURE — 1123F ACP DISCUSS/DSCN MKR DOCD: CPT | Performed by: OTOLARYNGOLOGY

## 2022-10-05 PROCEDURE — 99214 OFFICE O/P EST MOD 30 MIN: CPT | Performed by: OTOLARYNGOLOGY

## 2022-10-05 RX ORDER — OFLOXACIN 3 MG/ML
5 SOLUTION AURICULAR (OTIC) 2 TIMES DAILY
Qty: 10 ML | Refills: 0 | Status: SHIPPED | OUTPATIENT
Start: 2022-10-05 | End: 2022-10-08

## 2022-10-05 ASSESSMENT — ENCOUNTER SYMPTOMS
EYES NEGATIVE: 1
GASTROINTESTINAL NEGATIVE: 1
RESPIRATORY NEGATIVE: 1
ALLERGIC/IMMUNOLOGIC NEGATIVE: 1

## 2022-10-05 NOTE — PROGRESS NOTES
10/5/2022    Ramiro Kraus (:  1955) is a 78 y.o. female, Established patient, here for evaluation of the following chief complaint(s): Other (Myringotomy)      Vitals:    10/05/22 1313   BP: 126/78   Weight: 275 lb (124.7 kg)   Height: 5' 3\" (1.6 m)       Wt Readings from Last 3 Encounters:   10/05/22 275 lb (124.7 kg)   22 275 lb (124.7 kg)   22 278 lb (126.1 kg)       BP Readings from Last 3 Encounters:   10/05/22 126/78   22 138/86   22 128/78         SUBJECTIVE/OBJECTIVE:    Patient seen today for her ears. She suffers from intermittent eustachian dysfunction with a mixed hearing loss. She says sometimes it feels like her eardrums are going to burst.  Here today to talk about myringotomy bilaterally. Review of Systems   Constitutional: Negative. HENT:  Positive for hearing loss. Ear pressure   Eyes: Negative. Respiratory: Negative. Cardiovascular: Negative. Gastrointestinal: Negative. Endocrine: Negative. Musculoskeletal: Negative. Skin: Negative. Allergic/Immunologic: Negative. Neurological: Negative. Hematological: Negative. Psychiatric/Behavioral: Negative. Physical Exam  Vitals reviewed. Constitutional:       Appearance: Normal appearance. She is normal weight. HENT:      Head: Normocephalic and atraumatic. Right Ear: Tympanic membrane, ear canal and external ear normal.      Left Ear: Tympanic membrane, ear canal and external ear normal.      Nose: Nose normal.      Mouth/Throat:      Mouth: Mucous membranes are moist.      Pharynx: Oropharynx is clear. Eyes:      Extraocular Movements: Extraocular movements intact. Pupils: Pupils are equal, round, and reactive to light. Cardiovascular:      Rate and Rhythm: Normal rate and regular rhythm. Pulmonary:      Effort: Pulmonary effort is normal.      Breath sounds: Normal breath sounds.    Musculoskeletal:      Cervical back: Normal range of motion. Skin:     General: Skin is warm and dry. Neurological:      General: No focal deficit present. Mental Status: She is alert and oriented to person, place, and time. Psychiatric:         Mood and Affect: Mood normal.         Behavior: Behavior normal.      Myringotomy bilateral  After obtaining informed consent, the patient was prepped for myringotomy. The right ear was addressed first.  Phenol was applied to the inferior quadrant and once took effect a small myringotomy incision was made in the radial fashion. Patient tolerated well. The left ear was addressed in a similar fashion. The patient tolerated the entire procedure well with no pain. ASSESSMENT/PLAN:    1. Dysfunction of both eustachian tubes  2. Sensorineural hearing loss (SNHL) of both ears  Drops for the next 3 days and keep the ears dry. I would like to back in 2 weeks to see how she is doing. Return in about 2 weeks (around 10/19/2022). An electronic signature was used to authenticate this note. Randa Sanchez MD       Please note that this chart was generated using dragon dictation software. Although every effort was made to ensure the accuracy of this automated transcription, some errors in transcription may have occurred.

## 2022-10-20 ENCOUNTER — OFFICE VISIT (OUTPATIENT)
Dept: ENT CLINIC | Age: 67
End: 2022-10-20
Payer: MEDICARE

## 2022-10-20 VITALS
DIASTOLIC BLOOD PRESSURE: 84 MMHG | SYSTOLIC BLOOD PRESSURE: 132 MMHG | BODY MASS INDEX: 48.73 KG/M2 | HEIGHT: 63 IN | WEIGHT: 275 LBS

## 2022-10-20 DIAGNOSIS — H90.3 SENSORINEURAL HEARING LOSS (SNHL) OF BOTH EARS: Primary | ICD-10-CM

## 2022-10-20 PROCEDURE — 99213 OFFICE O/P EST LOW 20 MIN: CPT | Performed by: OTOLARYNGOLOGY

## 2022-10-20 PROCEDURE — 1123F ACP DISCUSS/DSCN MKR DOCD: CPT | Performed by: OTOLARYNGOLOGY

## 2022-10-20 ASSESSMENT — ENCOUNTER SYMPTOMS
GASTROINTESTINAL NEGATIVE: 1
RESPIRATORY NEGATIVE: 1
EYES NEGATIVE: 1
ALLERGIC/IMMUNOLOGIC NEGATIVE: 1

## 2022-10-20 NOTE — PROGRESS NOTES
10/20/2022    Akiko Busch (:  1955) is a 78 y.o. female, Established patient, here for evaluation of the following chief complaint(s):  Follow-up (Ears)      Vitals:    10/20/22 1128   BP: 132/84   Weight: 275 lb (124.7 kg)   Height: 5' 3\" (1.6 m)       Wt Readings from Last 3 Encounters:   10/20/22 275 lb (124.7 kg)   10/05/22 275 lb (124.7 kg)   22 275 lb (124.7 kg)       BP Readings from Last 3 Encounters:   10/20/22 132/84   10/05/22 126/78   22 138/86         SUBJECTIVE/OBJECTIVE:    Patient seen today for her ears. At the last visit I performed myringotomies bilaterally shows it did not help. She does have hearing aids but she said they will will no longer pair with her phone she cannot really get them to work. Review of Systems   Constitutional: Negative. HENT:  Positive for hearing loss. Eyes: Negative. Respiratory: Negative. Cardiovascular: Negative. Gastrointestinal: Negative. Endocrine: Negative. Musculoskeletal: Negative. Skin: Negative. Allergic/Immunologic: Negative. Neurological: Negative. Hematological: Negative. Psychiatric/Behavioral: Negative. Physical Exam  Vitals reviewed. Constitutional:       Appearance: Normal appearance. She is normal weight. HENT:      Head: Normocephalic and atraumatic. Right Ear: Tympanic membrane, ear canal and external ear normal.      Left Ear: Tympanic membrane, ear canal and external ear normal.      Nose: Nose normal.      Mouth/Throat:      Mouth: Mucous membranes are moist.      Pharynx: Oropharynx is clear. Eyes:      Extraocular Movements: Extraocular movements intact. Pupils: Pupils are equal, round, and reactive to light. Cardiovascular:      Rate and Rhythm: Normal rate and regular rhythm. Pulmonary:      Effort: Pulmonary effort is normal.      Breath sounds: Normal breath sounds. Musculoskeletal:      Cervical back: Normal range of motion.    Skin: General: Skin is warm and dry. Neurological:      General: No focal deficit present. Mental Status: She is alert and oriented to person, place, and time. Psychiatric:         Mood and Affect: Mood normal.         Behavior: Behavior normal.            ASSESSMENT/PLAN:    1. Sensorineural hearing loss (SNHL) of both ears  I recommend she go back to the place where she got the hearing aids so they can work on them to get them to pair with her phone so she can use them. Follow-up as needed. Return if symptoms worsen or fail to improve. An electronic signature was used to authenticate this note. Venancio Trammell MD       Please note that this chart was generated using dragon dictation software. Although every effort was made to ensure the accuracy of this automated transcription, some errors in transcription may have occurred.

## 2022-11-01 ENCOUNTER — OFFICE VISIT (OUTPATIENT)
Dept: NEUROLOGY | Age: 67
End: 2022-11-01
Payer: MEDICARE

## 2022-11-01 VITALS
SYSTOLIC BLOOD PRESSURE: 137 MMHG | WEIGHT: 267 LBS | DIASTOLIC BLOOD PRESSURE: 67 MMHG | BODY MASS INDEX: 47.31 KG/M2 | HEART RATE: 60 BPM | RESPIRATION RATE: 18 BRPM | HEIGHT: 63 IN

## 2022-11-01 DIAGNOSIS — R51.9 CHRONIC DAILY HEADACHE: Primary | ICD-10-CM

## 2022-11-01 DIAGNOSIS — R20.0 FACIAL NUMBNESS: ICD-10-CM

## 2022-11-01 PROCEDURE — 1123F ACP DISCUSS/DSCN MKR DOCD: CPT | Performed by: PSYCHIATRY & NEUROLOGY

## 2022-11-01 PROCEDURE — 99203 OFFICE O/P NEW LOW 30 MIN: CPT | Performed by: PSYCHIATRY & NEUROLOGY

## 2022-11-01 RX ORDER — RIMEGEPANT SULFATE 75 MG/75MG
75 TABLET, ORALLY DISINTEGRATING ORAL EVERY OTHER DAY
Qty: 16 TABLET | Refills: 5 | Status: SHIPPED | OUTPATIENT
Start: 2022-11-01 | End: 2023-04-30

## 2022-11-01 NOTE — PROGRESS NOTES
[address], [address]  Serene Longo  Phone [phone]  Fax [phone]     [facility] PATIENT VISIT        Patient: Daniel Howell  :  1955  Age:  78 y.o. MRN:  [id number]  Account #:  [de-identified]:  [id number]    Referring Provider: Romy Johnson  [address] [address]  Consulting Provider: Gwendolyn Becerra M.D.    [facility]:  Chief Complaint   Patient presents with    New Patient    Headache       History Source: History obtained from the patient. PCP: Seven LOMBARDI    HISTORY OF PRESENTILLNESS:   Daniel Howell is a 78y.o. year old woman who was referred for chronic headaches. She has a remote history of intermittent migraine headaches. For years she has has daily bifrontal headaches. They last all day. She often will get what she calls a squeezing sensation that is severe. She has tried taking Tylenol but has not helped. She has not found aggravating or alleviating features. She has been on Topamax and says that did not help. She takes gabapentin for diabetic polyneuropathy. She has recently seen an eye doctor (Dr. Scarlett Swanson). She did have an MRI head. She denies vision symptoms. She also complains of intermittent facial numbness bilaterally. PAST MEDICAL HITORY:    Past Medical History:   Diagnosis Date    Arthritis     Diabetes mellitus ([address]     Dizziness     Hearing loss     Hyperlipidemia     Hypertension     Pulmonary embolism ([address]        Past Surgical History:   Procedure Laterality Date    CHOLECYSTECTOMY      HERNIA REPAIR      HYSTERECTOMY, TOTAL ABDOMINAL (CERVIX REMOVED)         Recent Hospitalizations  None    Significant Injuries  None    Habits  Daniel Howell reports that she has never smoked. She has never used smokeless tobacco. She reports that she does not drink alcohol and does not use drugs.     Current Outpatient Medications   Medication Sig Dispense Refill    acetaminophen (TYLENOL) 650 MG extended release tablet Take 650 mg by mouth every 8 hours as needed      amLODIPine (NORVASC) 10 MG tablet Take 10 mg by mouth daily       atorvastatin (LIPITOR) 40 MG tablet Take 80 mg by mouth daily       carvedilol (COREG) 25 MG tablet Take 25 mg by mouth       cholestyramine (QUESTRAN) 4 g packet       furosemide (LASIX) 20 MG tablet Take 20 mg by mouth daily      FLUoxetine (PROZAC) 40 MG capsule Take 40 mg by mouth       linagliptin (TRADJENTA) 5 MG tablet Take 5 mg by mouth daily      pantoprazole (PROTONIX) 40 MG tablet       XARELTO 20 MG TABS tablet Take 20 mg by mouth daily       gabapentin (NEURONTIN) 300 MG capsule 900 mg 2 times daily. 3 PO BID      neomycin-polymyxin-hydrocortisone 1 % SOLN otic solution       VITAMIN D, ERGOCALCIFEROL, PO Take by mouth daily      B Complex Vitamins (B-COMPLEX/B-12 PO) Take by mouth daily       Fiber, Corn Dextrin, POWD Take by mouth 2 tablespoons a day       No current facility-administered medications for this visit. Allergies as of 11/01/2022 - Fully Reviewed 11/01/2022   Allergen Reaction Noted    Latex Itching 05/25/2022       FAMILY HISTORY:   No family history on file. SOCIAL HISTORY:   Nima Solitario is , lives in [place], [place], and is retired. REVIEW OF SYSTEMS:  Review of Systems   Constitutional:  Negative for chills and fever. HENT:  Negative for hearing loss and tinnitus. Eyes:  Positive for photophobia. Negative for visual disturbance. Respiratory:  Positive for shortness of breath. Negative for cough. Cardiovascular:  Negative for chest pain and palpitations. Gastrointestinal:  Negative for nausea and vomiting. Endocrine: Negative for polydipsia and polyuria. Genitourinary:  Negative for frequency and urgency. Musculoskeletal:  Positive for arthralgias. Negative for back pain. Skin:  Negative for rash and wound.    Allergic/Immunologic: Negative for environmental allergies and food allergies. Neurological:  Positive for numbness and headaches. Negative for weakness. Hematological:  Negative for adenopathy. Does not bruise/bleed easily. Psychiatric/Behavioral:  Negative for dysphoric mood. The patient is not nervous/anxious. PHYSICAL EXAMINATION:  Vitals:  /67   Pulse 60   Resp 18   Ht 5' 3\" (1.6 m)   Wt 267 lb (121.1 kg)   BMI 47.30 kg/m²   General appearance:  Alert, well developed, well nourished, in no distress  HEENT:  normocephalic, atraumatic, sclera appear normal, no nasal abnormalities, no rhinorrhea, Ears appear normal, oral mucous membranes are moist without erythema, trachea midline, thyroid is normal, no lymphadenopathy or neck mass. Cardiovascular:  Regular rate and rhythm without murmer. No peripheral edema, No cyanosis or clubbing. No carotid bruits. Pulmonary:  Lungs are clear to auscultation. Breathing appears normal, good expansion, normal effort without use of accessory muscles  Musculoskeletal:  Joints are normal.   Integument:  No rash, erythema, or pallor  Psychiatric:  Mood, affect, and behavior appear normal      NEUROLOGIC EXAMINATION:  Neurologic Exam     Mental Status   Oriented to person, place, and time. Speech: speech is normal   Level of consciousness: alert  Speech is fluent. Cranial Nerves     CN II   Visual fields full to confrontation. CN III, IV, VI   Pupils are equal, round, and reactive to light. Extraocular motions are normal.     CN VII   Facial expression full, symmetric.      CN VIII   Hearing: intact    CN IX, X   Palate: symmetric    CN XI   Right sternocleidomastoid strength: normal  Left sternocleidomastoid strength: normal  Right trapezius strength: normal  Left trapezius strength: normal    CN XII   Tongue: not atrophic  Fasciculations: absent  Tongue deviation: none    Motor Exam   Muscle bulk: normal  Overall muscle tone: normal  Right arm pronator drift: absent  Left arm pronator drift: absent    Strength   Right neck flexion: 5/5  Left neck flexion: 5/5  Right neck extension: 5/5  Left neck extension: 5/5  Right deltoid: 5/5  Left deltoid: 5/5  Right biceps: 5/5  Left biceps: 5/5  Right triceps: 5/5  Left triceps: 5/5  Right wrist flexion: 5/5  Left wrist flexion: 5/5  Right wrist extension: 5/5  Left wrist extension: 5/5  Right interossei: 5/5  Left interossei: 5/5  Right iliopsoas: 5/5  Left iliopsoas: 5/5  Right quadriceps: 5/5  Left quadriceps: 5/5  Right glutei: 5/5  Left glutei: 5/5  Right anterior tibial: 5/5  Left anterior tibial: 5/5  Right posterior tibial: 5/5  Left posterior tibial: 5/5  Right peroneal: 5/5  Left peroneal: 5/5  Right gastroc: 5/5  Left gastroc: 5/5    Sensory Exam   Light touch normal.   Vibration normal.     Gait, Coordination, and Reflexes     Gait  Gait: normal    Coordination   Romberg: negative  Finger to nose coordination: normal    Tremor   Resting tremor: absent  Intention tremor: absent  Action tremor: absent    Reflexes   Right brachioradialis: 1+  Left brachioradialis: 1+  Right biceps: 1+  Left biceps: 1+  Right triceps: 1+  Left triceps: 1+  Right patellar: 1+  Left patellar: 1+  Right achilles: 0  Left achilles: 0  Right plantar: normal  Left plantar: normal  Right Jett: absent  Left Jett: absent  Right ankle clonus: absent  Left ankle clonus: absent  Rapid alternating movements were normal.     ADDITIONAL REVIEW:  Narrative   EXAMINATION: MRI of the brain with and without contrast 5/6/2022   HISTORY: Significant asymmetrical hearing loss. FINDINGS: Multiplanar fast spin-echo imaging sequences were obtained   of the brain on a high-field magnet both with and without gadolinium   enhancement. Multiple foci of T2 abnormality are present involving the   periventricular and higher white matter tracts. Several of these   lesions are at the callososeptal interface raising the differential of   a demyelinating process such as multiple sclerosis.  I would rather   favor small vessel ischemic disease and this would be an unusual age   and manifestation for multiple sclerosis. There is no mass, mass effect or shift of the midline. Normal   flow-voids are noted within the [place] of [place]. Dilated perivascular spaces are noted within the basal ganglia and   centrum semiovale bilaterally and symmetrically. The orbits are unremarkable. The visualized paranasal sinuses and   mastoid air cells are normally aerated. No abnormal contrast enhancement is demonstrated. Impression   1.. Multiple foci of T2 abnormality in the periventricular and higher   white matter tracts with differentials above. I would favor small   vessel ischemic disease. There is no diffusion restriction to suggest   acute ischemia or infarct. No mass effect or shift of the midline. 2. Normal flow-voids are noted within the [place] of [place]. The orbits   are unremarkable. 3. No evidence of abnormal contrast enhancement. Signed by Dr Kiersten Maldonado    This result has an attachment that is not available. SLEEP STUDY REPORT     REFERRING PHYSICIAN:  ZULEYKA Morin     HISTORY OF PRESENT ILLNESS:  Patient is 59years old. Patient has BMI of   56 with height 5 feet 2-1/2 inches and weight of 304 pounds. Patient neck   circumference is 16.5 inches. Patient has [place] sleepiness scale of   15-18. Patient has history of hypertension, heart disease, diabetes,   snoring. Patient is on fluoxetine, furosemide, carvedilol, amlodipine,   atorvastatin, vitamin D, gabapentin, Benefiber, colestipol, multivitamin,   Xarelto, pantoprazole. Patient is had gallbladder surgery and   hysterectomy. Patient has allergy to latex. Patient has problems keeping   her legs still at night or needing to move them to feel comfortable. FINDINGS ON STUDY:  Patient underwent a 1 night home sleep test using the   [facility] device. AHI 1 and RDI 10.   Patient low SpO2 is 90.2%. Snoring was 2.4%. Mean pulse rate 64 bpm.  By behavioral criteria patient   slept for  6.6 hours with sleep latency of 5 minutes and sleep efficiency   of 94.2%. Patient's slept supine 1.8% of the night. IMPRESSION:    ICD-10-CM    1. Chronic daily headache  R51.9       2. Facial numbness  R20.0       I discussed chronic headaches with her including pathophysiology, risks, symptoms, evaluation, treatment options, and potential side effects of treatment. PLAN:  1. Will have you get a blood test called a sedementation rate to check for a disorder called temporal arteritis. 2. Start taking Nurtec 75 mg every other day.     [place] M.D.

## 2022-11-01 NOTE — PATIENT INSTRUCTIONS
INSTRUCTIONS:  1. Will have you get a blood test called a sedementation rate to check for a disorder called temporal arteritis. 2. Start taking Nurtec 75 mg every other day.

## 2022-11-15 ENCOUNTER — TELEPHONE (OUTPATIENT)
Dept: NEUROLOGY | Age: 67
End: 2022-11-15

## 2022-11-16 ENCOUNTER — TELEPHONE (OUTPATIENT)
Dept: NEUROLOGY | Age: 67
End: 2022-11-16

## 2022-11-16 NOTE — TELEPHONE ENCOUNTER
PA Case: [id number], Status: Approved, Coverage Starts on: 8/17/2022 12:00:00 AM, Coverage Ends on: 11/16/2023 12:00:00 AM.

## 2022-11-17 ENCOUNTER — LAB (OUTPATIENT)
Dept: LAB | Facility: HOSPITAL | Age: 67
End: 2022-11-17

## 2022-11-17 ENCOUNTER — OFFICE VISIT (OUTPATIENT)
Dept: ONCOLOGY | Facility: CLINIC | Age: 67
End: 2022-11-17

## 2022-11-17 VITALS
WEIGHT: 270.2 LBS | SYSTOLIC BLOOD PRESSURE: 132 MMHG | DIASTOLIC BLOOD PRESSURE: 88 MMHG | HEART RATE: 64 BPM | BODY MASS INDEX: 47.88 KG/M2 | RESPIRATION RATE: 16 BRPM | OXYGEN SATURATION: 98 % | HEIGHT: 63 IN | TEMPERATURE: 97.3 F

## 2022-11-17 DIAGNOSIS — K76.0 NON-ALCOHOLIC FATTY LIVER DISEASE: ICD-10-CM

## 2022-11-17 DIAGNOSIS — N18.2 CHRONIC KIDNEY DISEASE (CKD), STAGE II (MILD): ICD-10-CM

## 2022-11-17 DIAGNOSIS — D61.818 PANCYTOPENIA: ICD-10-CM

## 2022-11-17 DIAGNOSIS — D50.9 IRON DEFICIENCY ANEMIA, UNSPECIFIED IRON DEFICIENCY ANEMIA TYPE: Primary | ICD-10-CM

## 2022-11-17 DIAGNOSIS — D50.9 IRON DEFICIENCY ANEMIA, UNSPECIFIED IRON DEFICIENCY ANEMIA TYPE: ICD-10-CM

## 2022-11-17 DIAGNOSIS — E53.8 B12 DEFICIENCY: ICD-10-CM

## 2022-11-17 LAB
ALBUMIN SERPL-MCNC: 4.4 G/DL (ref 3.5–5.2)
ALBUMIN/GLOB SERPL: 1.4 G/DL
ALP SERPL-CCNC: 127 U/L (ref 39–117)
ALT SERPL W P-5'-P-CCNC: 21 U/L (ref 1–33)
ANION GAP SERPL CALCULATED.3IONS-SCNC: 8 MMOL/L (ref 5–15)
AST SERPL-CCNC: 22 U/L (ref 1–32)
BASOPHILS # BLD AUTO: 0.03 10*3/MM3 (ref 0–0.2)
BASOPHILS NFR BLD AUTO: 0.6 % (ref 0–1.5)
BILIRUB SERPL-MCNC: 1 MG/DL (ref 0–1.2)
BUN SERPL-MCNC: 17 MG/DL (ref 8–23)
BUN/CREAT SERPL: 16.8 (ref 7–25)
CALCIUM SPEC-SCNC: 9.5 MG/DL (ref 8.6–10.5)
CHLORIDE SERPL-SCNC: 105 MMOL/L (ref 98–107)
CO2 SERPL-SCNC: 31 MMOL/L (ref 22–29)
CREAT SERPL-MCNC: 1.01 MG/DL (ref 0.57–1)
DEPRECATED RDW RBC AUTO: 44.3 FL (ref 37–54)
EGFRCR SERPLBLD CKD-EPI 2021: 61.5 ML/MIN/1.73
EOSINOPHIL # BLD AUTO: 0.1 10*3/MM3 (ref 0–0.4)
EOSINOPHIL NFR BLD AUTO: 2 % (ref 0.3–6.2)
ERYTHROCYTE [DISTWIDTH] IN BLOOD BY AUTOMATED COUNT: 12.1 % (ref 12.3–15.4)
FERRITIN SERPL-MCNC: 161.4 NG/ML (ref 13–150)
FOLATE SERPL-MCNC: 19.3 NG/ML (ref 4.78–24.2)
GLOBULIN UR ELPH-MCNC: 3.1 GM/DL
GLUCOSE SERPL-MCNC: 161 MG/DL (ref 65–99)
HCT VFR BLD AUTO: 37.3 % (ref 34–46.6)
HGB BLD-MCNC: 11.6 G/DL (ref 12–15.9)
IMM GRANULOCYTES # BLD AUTO: 0.02 10*3/MM3 (ref 0–0.05)
IMM GRANULOCYTES NFR BLD AUTO: 0.4 % (ref 0–0.5)
IRON 24H UR-MRATE: 69 MCG/DL (ref 37–145)
IRON SATN MFR SERPL: 16 % (ref 20–50)
LYMPHOCYTES # BLD AUTO: 1.33 10*3/MM3 (ref 0.7–3.1)
LYMPHOCYTES NFR BLD AUTO: 26.1 % (ref 19.6–45.3)
MCH RBC QN AUTO: 30.5 PG (ref 26.6–33)
MCHC RBC AUTO-ENTMCNC: 31.1 G/DL (ref 31.5–35.7)
MCV RBC AUTO: 98.2 FL (ref 79–97)
MONOCYTES # BLD AUTO: 0.44 10*3/MM3 (ref 0.1–0.9)
MONOCYTES NFR BLD AUTO: 8.6 % (ref 5–12)
NEUTROPHILS NFR BLD AUTO: 3.17 10*3/MM3 (ref 1.7–7)
NEUTROPHILS NFR BLD AUTO: 62.3 % (ref 42.7–76)
NRBC BLD AUTO-RTO: 0 /100 WBC (ref 0–0.2)
PLATELET # BLD AUTO: 162 10*3/MM3 (ref 140–450)
PMV BLD AUTO: 10.1 FL (ref 6–12)
POTASSIUM SERPL-SCNC: 4.4 MMOL/L (ref 3.5–5.2)
PROT SERPL-MCNC: 7.5 G/DL (ref 6–8.5)
RBC # BLD AUTO: 3.8 10*6/MM3 (ref 3.77–5.28)
SODIUM SERPL-SCNC: 144 MMOL/L (ref 136–145)
TIBC SERPL-MCNC: 431 MCG/DL (ref 298–536)
TRANSFERRIN SERPL-MCNC: 289 MG/DL (ref 200–360)
VIT B12 BLD-MCNC: 604 PG/ML (ref 211–946)
WBC NRBC COR # BLD: 5.09 10*3/MM3 (ref 3.4–10.8)

## 2022-11-17 PROCEDURE — 85025 COMPLETE CBC W/AUTO DIFF WBC: CPT

## 2022-11-17 PROCEDURE — 83540 ASSAY OF IRON: CPT

## 2022-11-17 PROCEDURE — 99214 OFFICE O/P EST MOD 30 MIN: CPT | Performed by: NURSE PRACTITIONER

## 2022-11-17 PROCEDURE — 82607 VITAMIN B-12: CPT

## 2022-11-17 PROCEDURE — 82746 ASSAY OF FOLIC ACID SERUM: CPT

## 2022-11-17 PROCEDURE — 82728 ASSAY OF FERRITIN: CPT

## 2022-11-17 PROCEDURE — 80053 COMPREHEN METABOLIC PANEL: CPT

## 2022-11-17 PROCEDURE — 84466 ASSAY OF TRANSFERRIN: CPT

## 2022-11-17 PROCEDURE — 36415 COLL VENOUS BLD VENIPUNCTURE: CPT

## 2022-11-17 RX ORDER — RIMEGEPANT SULFATE 75 MG/75MG
TABLET, ORALLY DISINTEGRATING ORAL
COMMUNITY

## 2022-11-17 NOTE — PROGRESS NOTES
[facility]  [address]  [place] [address] [phone]    Patient Name: Mariia Parker  Encounter Date: 11/17/2022  YOB: 1955  Patient Number: [id number]    Hematology / Oncology Progress Note       REASON FOR VISIT: Ms. 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 She is also seen for multifactoral anemia from iron deficiency, hypersplenism and CKD stage III. She was intolerant to oral iron therapy and was switched to Nulecit in August 2019. She has not required iron replacement since.    PREVIOUS INTERVENTIONS:   1. 2 units PRBCs 10/13/2016 at [facility].  2. Procrit 40,000 units subcutaneously 10/13/2016 through present at [facility]. Details not available.  3. Ferrous sulfate 325 mg 07/20/2017 through 01/22/2018. Resume 05/21/2018 through 09/17/2018. Resumed 11/21/2018 through 12/06/2018. Resume 05/13/2019.  4. Nulecit 125mg weekly x3 8/13/19, 8/20/19, 8/27/19      INTERVAL HISTORY    She presents to clinic today for continued follow up. She has no acute complaints today.  She weighs 270# today which is an additional 9 pounds from her last visit.      She had labs drawn today and results were reviewed with her. Chemistry with glucose 161, Alk Phos 127.   Hgb 11.6, Hct 37.3.  Iron 69,  Ferritin 161, Sat 16%,  TIBC 431.      LABS    Lab Results - Last 18 Months   Lab Units 11/17/22  1002 05/18/22  1041 09/23/21  0909   HEMOGLOBIN g/dL 11.6* 12.2 11.4*   HEMATOCRIT % 37.3 39.0 36.7   MCV fL 98.2* 96.1 98.1*   WBC 10*3/mm3 5.09 4.18 4.77   RDW % 12.1* 13.1 12.2*   MPV fL 10.1 10.2 9.1   PLATELETS 10*3/mm3 162 187 174   IMM GRAN % % 0.4 0.2 0.6*   NEUTROS ABS 10*3/mm3 3.17 2.45 3.02   LYMPHS ABS 10*3/mm3 1.33 1.11 1.19   MONOS ABS  10*3/mm3 0.44 0.49 0.43   EOS ABS 10*3/mm3 0.10 0.09 0.08   BASOS ABS 10*3/mm3 0.03 0.03 0.02   IMMATURE GRANS (ABS) 10*3/mm3 0.02 0.01 0.03   NRBC /100 WBC 0.0 0.0  --        Lab Results - Last 18 Months   Lab Units 11/17/22  1002 05/18/22  1041 09/23/21  0909   GLUCOSE mg/dL 161* 129* 202*   SODIUM mmol/L 144 142 142   POTASSIUM mmol/L 4.4 4.0 5.2   CO2 mmol/L 31.0* 22.0 26.0   CHLORIDE mmol/L 105 109* 105   ANION GAP mmol/L 8.0 11.0 11.0   CREATININE mg/dL 1.01* 1.10* 1.12*   BUN mg/dL 17 19 25*   BUN / CREAT RATIO  16.8 17.3 22.3   CALCIUM mg/dL 9.5 9.8 9.8   EGFR IF NONAFRICN AM mL/min/1.73  --   --  49*   ALK PHOS U/L 127* 111 150*   TOTAL PROTEIN g/dL 7.5 7.1 7.6   ALT (SGPT) U/L 21 19 25   AST (SGOT) U/L 22 21 25   BILIRUBIN mg/dL 1.0 0.7 0.6   ALBUMIN g/dL 4.40 4.40 4.40   GLOBULIN gm/dL 3.1 2.7 3.2       No results for input(s): MSPIKE, KAPPALAMB, IGLFLC, URICACID, FREEKAPPAL, CEA, LDH, REFLABREPO in the last 30102 hours.    Lab Results - Last 18 Months   Lab Units 11/17/22  1002 05/18/22  1041 09/23/21  0909   IRON mcg/dL 69 64 77   TIBC mcg/dL 431 450 474   IRON SATURATION % 16* 14* 16*   FERRITIN ng/mL 161.40* 172.10* 174.10*   FOLATE ng/mL 19.30 13.70 16.70         PAST MEDICAL HISTORY:  ALLERGIES:  No Known Allergies  CURRENT MEDICATIONS:  Outpatient Encounter Medications as of 11/17/2022   Medication Sig Dispense Refill   • acetaminophen (TYLENOL) 650 MG 8 hr tablet Take 650 mg by mouth Every 8 (Eight) Hours As Needed for Mild Pain .     • amLODIPine (NORVASC) 10 MG tablet Take 10 mg by mouth Daily.     • atorvastatin (LIPITOR) 40 MG tablet Take 40 mg by mouth Daily.     • carvedilol (COREG) 25 MG tablet Take 25 mg by mouth 2 (Two) Times a Day With Meals.     • colestipol (COLESTID) 1 g tablet Take 1 g by mouth 2 (Two) Times a Day.     • FLUoxetine (PROzac) 20 MG capsule Take 40 mg by mouth Daily.     • furosemide (LASIX) 20 MG tablet Take 20 mg by mouth Daily. 1 or 2 tablets daily     • gabapentin  (NEURONTIN) 300 MG capsule Take 900 mg by mouth 2 (Two) Times a Day.     • linagliptin (TRADJENTA) 5 MG tablet tablet Take 5 mg by mouth Daily.     • lisinopril (PRINIVIL,ZESTRIL) 20 MG tablet Take 20 mg by mouth Daily.     • Multiple Vitamins-Minerals (MULTIVITAMIN ADULT PO) Take  by mouth.     • pantoprazole (PROTONIX) 40 MG EC tablet Take 40 mg by mouth Daily.     • Rimegepant Sulfate (Nurtec) 75 MG tablet dispersible tablet Take  by mouth.     • VITAMIN D, ERGOCALCIFEROL, PO Take  by mouth Daily.     • Wheat Dextrin (BENEFIBER PO) Take  by mouth As Needed.     • XARELTO 20 MG tablet 20 mg Daily With Dinner.       No facility-administered encounter medications on file as of 11/17/2022.     ADULT ILLNESSES:  Patient Active Problem List   Diagnosis Code   • Body mass index (BMI) of 50-59.9 in adult (HCC) Z68.43   • Stage 3 chronic kidney disease (HCC) N18.30   • Pancytopenia (HCC) D61.818   • Hypersplenism D73.1   • Hypertension I10   • Non-alcoholic fatty liver disease K76.0   • Anemia in stage 3 chronic kidney disease (HCC) N18.30, D63.1     SURGERIES:  Past Surgical History:   Procedure Laterality Date   • CARPAL TUNNEL RELEASE Right    • ENDOSCOPY AND COLONOSCOPY  03/2016   • FOREIGN BODY REMOVAL Right     leg   • HYSTERECTOMY      open; total   • LAPAROSCOPIC CHOLECYSTECTOMY     • SMALL INTESTINE SURGERY  06/2016   • UMBILICAL HERNIA REPAIR      not sure if has mesh in there or not (x4 surgeries)      HEALTH MAINTENANCE ITEMS:  Health Maintenance Due   Topic Date Due   • DXA SCAN  Never done   • COLORECTAL CANCER SCREENING  Never done   • COVID-19 Vaccine (1) Never done   • Pneumococcal Vaccine 65+ (1 - PCV) Never done   • ZOSTER VACCINE (1 of 2) Never done   • TDAP/TD VACCINES (2 - Tdap) 11/07/2007   • HEPATITIS C SCREENING  Never done   • ANNUAL WELLNESS VISIT  Never done   • PAP SMEAR  Never done   • LIPID PANEL  Never done   • MAMMOGRAM  10/26/2018   • INFLUENZA VACCINE  Never done       <no  "information>  Last Completed Colonoscopy     This patient has no relevant Health Maintenance data.          There is no immunization history on file for this patient.  Last Completed Mammogram     This patient has no relevant Health Maintenance data.            FAMILY HISTORY:  Family History   Problem Relation Age of Onset   • Cancer Mother    • Hypertension Mother    • Heart disease Mother    • Breast cancer Mother    • Dementia Mother    • Parkinsonism Mother    • No Known Problems Father    • Hypertension Brother    • Other Maternal Grandmother         brain tumor   • No Known Problems Maternal Grandfather    • No Known Problems Paternal Grandmother    • No Known Problems Paternal Grandfather    • No Known Problems Brother      SOCIAL HISTORY:  Social History     Socioeconomic History   • Marital status:    Tobacco Use   • Smoking status: Never   • Smokeless tobacco: Never   Substance and Sexual Activity   • Alcohol use: No   • Drug use: No   • Sexual activity: Defer     Birth control/protection: Surgical       REVIEW OF SYSTEMS:  Review of Systems   Constitutional: Negative for fatigue and fever.   HENT: Negative for trouble swallowing.    Respiratory: Negative for cough and shortness of breath.    Cardiovascular: Negative for chest pain, palpitations and leg swelling.   Gastrointestinal: Negative for nausea and vomiting.   Genitourinary: Negative for hematuria.   Musculoskeletal: Negative for arthralgias and myalgias.   Skin: Negative for rash, skin lesions and wound.   Neurological: Negative for dizziness, syncope, memory problem and confusion.   Psychiatric/Behavioral: Negative for suicidal ideas and depressed mood. The patient is not nervous/anxious.        /88   Pulse 64   Temp 97.3 °F (36.3 °C)   Resp 16   Ht 160 cm (63\")   Wt 123 kg (270 lb 3.2 oz)   SpO2 98%   BMI 47.86 kg/m²  Body surface area is 2.2 meters squared.    Pain Score    11/17/22 1019   PainSc: 0-No pain       Physical " Exam:  Physical Exam  Constitutional:       Appearance: Normal appearance. She is obese.   HENT:      Head: Normocephalic and atraumatic.   Cardiovascular:      Rate and Rhythm: Normal rate and regular rhythm.   Pulmonary:      Effort: Pulmonary effort is normal.      Breath sounds: Normal breath sounds.   Abdominal:      General: Bowel sounds are normal.      Palpations: Abdomen is soft.   Musculoskeletal:      Right lower leg: No edema.      Left lower leg: No edema.   Skin:     General: Skin is warm and dry.   Neurological:      Mental Status: She is alert and oriented to person, place, and time.   Psychiatric:         Attention and Perception: Attention normal.         Mood and Affect: Mood normal.         Judgment: Judgment normal.         Mariia Parker reports a pain score of . No intervention indicated.      ASSESSMENT / PLAN    1. Iron deficiency anemia, unspecified iron deficiency anemia type    2. Chronic kidney disease (CKD), stage II (mild)    3. B12 deficiency    4. Non-alcoholic fatty liver disease        ASSESSMENT:      1. 2.  Iron Deficiency / Anemia in CKD  -Pt has multifactorial Anemia with intermittent iron deficiency and chronic kidney disease Stage III  -Hgb 11.6, Hct 37.3  - Iron 69,  Ferritin 161, Sat 16%,  TIBC 431.  -No intervention indicated at this time.      3.  Fatty Liver Disease   - Non-alcoholic fatty liver disease;  - LFTs normal, ALKP stablet 127.    4.  B12 Deficiency   - Has been on B12 supplementation with B12 tablets and continues on these.  -B12 today 602    -Last mammogram was October 10, 2021 and was normal     PLAN:   1. Reviewed available lab work results with the patient today and will contact the patient with the pending studies as available.    2. Continue B12 1000 mcg tablet daily.  3. Will plan Procrit 40,000 units subcutaneously if hemoglobin becomes less than 10 and hematocrit less than 30 to target a hemoglobin of 11 and hematocrit of 33%. If GFR < 60,  Ferritin> 100.  She currently does not meed this criteria.   4. Continue ongoing management per primary care physician and other specialists.   5. Plan of care discussed with patient. Understanding expressed. Patient agreeable to proceed.   6.  She will have labs only at [facility] in 3 months   7. Return to office in 6 months for follow up and preoffice cbc, cmp, iron profile and ferritin, B12 and folate.    Pt voices understanding and agrees to tx plan.     Sadie Campbell, APRN  11/17/2022

## 2022-11-19 ASSESSMENT — ENCOUNTER SYMPTOMS
NAUSEA: 0
BACK PAIN: 0
SHORTNESS OF BREATH: 1
VOMITING: 0
PHOTOPHOBIA: 1
COUGH: 0

## 2022-12-19 ENCOUNTER — HOSPITAL ENCOUNTER (OUTPATIENT)
Dept: MRI IMAGING | Age: 67
Discharge: HOME | End: 2022-12-19
Payer: MEDICARE

## 2022-12-19 DIAGNOSIS — M25.821 MASS OF JOINT OF RIGHT ELBOW: ICD-10-CM

## 2022-12-19 PROCEDURE — 73221 MRI JOINT UPR EXTREM W/O DYE: CPT

## 2023-01-03 ENCOUNTER — OFFICE VISIT (OUTPATIENT)
Dept: NEUROLOGY | Age: 68
End: 2023-01-03
Payer: MEDICARE

## 2023-01-03 VITALS
HEART RATE: 61 BPM | DIASTOLIC BLOOD PRESSURE: 63 MMHG | HEIGHT: 63 IN | SYSTOLIC BLOOD PRESSURE: 125 MMHG | WEIGHT: 267 LBS | BODY MASS INDEX: 47.31 KG/M2

## 2023-01-03 DIAGNOSIS — G47.10 HYPERSOMNOLENCE: ICD-10-CM

## 2023-01-03 DIAGNOSIS — R51.9 CHRONIC DAILY HEADACHE: Primary | ICD-10-CM

## 2023-01-03 DIAGNOSIS — R20.0 FACIAL NUMBNESS: ICD-10-CM

## 2023-01-03 DIAGNOSIS — R06.83 SNORING: ICD-10-CM

## 2023-01-03 PROCEDURE — 99213 OFFICE O/P EST LOW 20 MIN: CPT | Performed by: PSYCHIATRY & NEUROLOGY

## 2023-01-03 PROCEDURE — 1123F ACP DISCUSS/DSCN MKR DOCD: CPT | Performed by: PSYCHIATRY & NEUROLOGY

## 2023-01-03 RX ORDER — LISINOPRIL 5 MG/1
1 TABLET ORAL DAILY
COMMUNITY
Start: 2022-12-12

## 2023-01-03 RX ORDER — AMITRIPTYLINE HYDROCHLORIDE 25 MG/1
50 TABLET, FILM COATED ORAL NIGHTLY
Qty: 60 TABLET | Refills: 5 | Status: SHIPPED | OUTPATIENT
Start: 2023-01-03

## 2023-01-03 NOTE — PATIENT INSTRUCTIONS
INSTRUCTIONS:  1. Start taking amitriptyline 25 mg at bedtime. After a week or two, you can increase it to 2 at bedtime.   2. Will arrange a sleep study to assess for sleep apnea

## 2023-01-03 NOTE — PROGRESS NOTES
[facility] Neurology  [address] [place] [address] Drive, [address]  [place], [place]  Phone [phone]  Fax [phone]     [facility] Neurology Follow Up Encounter  1/3/23 11:29 AM CST    Information:   Patient Name: Vicky Delong  :   1955  Age:   78 y.o. MRN:   [id number]  Account #:  [de-identified]  Today:  1/3/23    Provider: Lurdes Meade M.D. Chief Complaint:   Chief Complaint   Patient presents with    Follow-up    Headache     Patient states that Nurtec does not seem to be helping her headaches. Subjective: Vicky Delong is a 78 y.o. woman with a history of daily headaches who is following up. For years she has has daily bifrontal headaches. They last all day. She often will get what she calls a squeezing sensation that is severe. She has tried taking Tylenol but has not helped. She has not found aggravating or alleviating features. Topamax did not help. She takes gabapentin for diabetic polyneuropathy. She has recently seen an eye doctor (Dr. Giselle Waldron). She did have an MRI head that showed small vessel disease. She denies vision symptoms. She also complains of intermittent facial numbness bilaterally. Her ESR was normal.  She was started on Nurtec every other day last visit. She says this really has not helped. She complains of poor sleep qaulity and daytime drowsiness. She snores. She wakens herself with snores. She does not feel rested upon awakening. She is drowsy throughout the daytime. She did have a home sleep apnea test in 2018 that showed mild sleep apnea. She wakens after only 5 hours of sleep and cannot get back to sleep.       Objective:     Past Medical History:  Past Medical History:   Diagnosis Date    Arthritis     Diabetes mellitus ([address]     Dizziness     Hearing loss     Hyperlipidemia     Hypertension     Pulmonary embolism ([facility] [address]        Past Surgical History:   Procedure Laterality Date    CHOLECYSTECTOMY      HERNIA REPAIR      HYSTERECTOMY, TOTAL ABDOMINAL (CERVIX REMOVED)         Recent Hospitalizations  None    Significant Injuries  None    Habits  Adolfo Gutierrez reports that she has never smoked. She has never used smokeless tobacco. She reports that she does not drink alcohol and does not use drugs. No family history on file. Social History  Adolfo Gutierrez is , lives in [facility], [address] [place] [address] [address], and is retired. Medications:  Current Outpatient Medications   Medication Sig Dispense Refill    lisinopril (PRINIVIL;ZESTRIL) 5 MG tablet Take 1 tablet by mouth daily      amitriptyline (ELAVIL) 25 MG tablet Take 2 tablets by mouth nightly 60 tablet 5    Rimegepant Sulfate (NURTEC) 75 MG TBDP Take 75 mg by mouth every other day 16 tablet 5    acetaminophen (TYLENOL) 650 MG extended release tablet Take 650 mg by mouth every 8 hours as needed      amLODIPine (NORVASC) 10 MG tablet Take 10 mg by mouth daily       atorvastatin (LIPITOR) 40 MG tablet Take 80 mg by mouth daily       carvedilol (COREG) 25 MG tablet Take 25 mg by mouth       cholestyramine (QUESTRAN) 4 g packet       furosemide (LASIX) 20 MG tablet Take 20 mg by mouth daily      FLUoxetine (PROZAC) 40 MG capsule Take 40 mg by mouth       linagliptin (TRADJENTA) 5 MG tablet Take 5 mg by mouth daily      pantoprazole (PROTONIX) 40 MG tablet       XARELTO 20 MG TABS tablet Take 20 mg by mouth daily       gabapentin (NEURONTIN) 300 MG capsule 900 mg 2 times daily. 3 PO BID      neomycin-polymyxin-hydrocortisone 1 % SOLN otic solution       VITAMIN D, ERGOCALCIFEROL, PO Take by mouth daily      B Complex Vitamins (B-COMPLEX/B-12 PO) Take by mouth daily       Fiber, Corn Dextrin, POWD Take by mouth 2 tablespoons a day       No current facility-administered medications for this visit. Allergies:   Allergies as of 01/03/2023 - Fully Reviewed 01/03/2023   Allergen Reaction Noted    Latex Itching 05/25/2022       Review of Systems:  Constitutional: negative for - chills and fever  Eyes:  negative for - visual disturbance  positive for - photophobia  HENMT: positive for - headaches and sinus pain  Respiratory: negative for - cough, hemoptysis  positive for - shortness of breath  Cardiovascular: negative for - chest pain and palpitations  Gastrointestinal: negative for - blood in stools, constipation, diarrhea, nausea, and vomiting  Genito-Urinary: negative for - hematuria, urinary frequency, urinary urgency, and urinary retention  Musculoskeletal: negative for - joint pain, joint stiffness, and joint swelling  Hematological and Lymphatic: negative for - bleeding problems, abnormal bruising, and swollen lymph nodes  Endocrine:  negative for - polydipsia and polyphagia  Allergy/Immunology:  negative for - rhinorrhea, sinus congestion, hives  Integument:  negative for - negative for - rash, change in moles, new or changing lesions  Psychological: negative for - anxiety and depression  Neurological: negative for - memory loss, weakness  positive for - numbness/tingling    PHYSICAL EXAMINATION:  Vitals:  /63   Pulse 61   Ht 5' 3\" (1.6 m)   Wt 267 lb (121.1 kg)   BMI 47.30 kg/m²   General appearance:  Alert, well developed, well nourished, in no distress  HEENT:  normocephalic, atraumatic, sclera appear normal, no nasal abnormalities, no rhinorrhea, Ears appear normal, oral mucous membranes are moist without erythema, trachea midline, thyroid is normal, no lymphadenopathy or neck mass. Cardiovascular:  Regular rate and rhythm without murmer. No peripheral edema, No cyanosis or clubbing. No carotid bruits. Pulmonary:  Lungs are clear to auscultation. Breathing appears normal, good expansion, normal effort without use of accessory muscles  Musculoskeletal:  Joints are normal.    Integument:  No rash, erythema, or pallor  Psychiatric:  Mood, affect, and behavior appear normal      NEUROLOGIC EXAMINATION:  Mental Status:  alert, oriented to person, place, and time.   Speech:  Clear without dysarthria or dysphonia  Language:  Fluent without aphasia  Cranial Nerves:   II Visual fields are full to confrontation   III,IV, VI Extraocular movements are full   VII Facial movements are symmetrical without weakness   VIII Hearing is intact   IX,X Shoulder shrug and head rotation strength are intact   XII No tongue atrophy or fasciculations. Normal tongue protrusion. No tongue weakness  Motor:  Normal strength in both upper and lower extremities. Normal muscle tone and bulk. Deep tendon reflexes are intact and symmetrical in both upper and lower extremities. Jett's signs are absent bilaterally. There is no ankle clonus on either side. Sensation:  Sensation is intact to light touch, temperature, and vibration in all extremities. Coordination:  Rapid alternating movements are normal in both upper and lower extremities. Finger to nose testing is unimpaired bilaterally. Gait:  Normal station and gait. Pertinent Diagnostic Studies:          Assessment:       ICD-10-CM    1. Chronic daily headache  R51.9       2. Facial numbness  R20.0       3. Snoring  R06.83 Baseline Diagnostic Sleep Study     Ambulatory referral to Sleep Medicine      4. Hypersomnolence  G47.10 Baseline Diagnostic Sleep Study     Ambulatory referral to Sleep Medicine      She is on Lipitor and Xarelto. Her BP and blood sugar are well controlled. Her recent C49 and folic acid were normal.  I have no thyroid function testing. She continues to have chronic daily headaches, not analgesic rebound from what she tells me. She has failed Topamax and Nurtec. Plan:   1. Start taking amitriptyline 25 mg at bedtime. After a week or two, you can increase it to 2 at bedtime. This is for a HA preventative and to help you stay asleep better. 2. Will arrange a sleep study to assess for sleep apnea  3.  Follow up in 3 months    Electronically signed by Jeanette Gama MD on 1/3/23

## 2023-02-08 ENCOUNTER — HOSPITAL ENCOUNTER (OUTPATIENT)
Dept: SLEEP CENTER | Age: 68
Discharge: HOME | End: 2023-02-10
Payer: MEDICARE

## 2023-02-08 PROCEDURE — 95810 POLYSOM 6/> YRS 4/> PARAM: CPT

## 2023-02-09 NOTE — PROGRESS NOTES
[place] [address]  [place], [address]  Phone [phone] Fax [phone]     Sleep Study Technician Review    Patient Name:  Haim Harris  :   [birth year]  Referring Provider: Chelsea Massey MD    Brief History: Haim Harris is a 78 y.o. female with a history of Diabetes, Hypertension and Headaches who has been referred for a sleep study. She has a remote history of intermittent migraine headaches. For years she has has daily bifrontal headaches. They last all day. She often will get what she calls a squeezing sensation that is severe. She has tried taking Tylenol but has not helped. She has not found aggravating or alleviating features. She has been on Topamax and says that did not help. She takes gabapentin for diabetic polyneuropathy. She has recently seen an eye doctor (Dr. Shannon Chamberlain). She did have an MRI head. She denies vision symptoms. She also complains of intermittent facial numbness bilaterally. Height:     5' 3\"  Weight:  267 lbs  BMI: 47.3  Neck Circ: 15.0\"  Mallampati 3  ESS:  15    Type of Study: PSG  Time Stage Position Snore Hypopnea Obs Apnea Christopher Apnea PAP O2   2100 2 Supine No No No No  RA   2200 2 Supine No No No No  RA   2300 2 Supine No No No No  RA   2400 REM Supine Yes Yes Yes No  RA   0100 1 Supine No No No No  RA   0200 2 Supine Yes No No No  RA   0300 Awake Supine No No No No  RA   0400 Awake Supine No No No No  RA                Summary: Pt stated that she has morning headaches. Pt stated that she does snore. DME: [facility]     The study was reviewed briefly with Haim Harris. She will be notified of the formal results and recommendations after the study is scored and interpreted. The report will be sent to his referring provider.     Technician:  INOCENTE Kelly

## 2023-03-15 DIAGNOSIS — G47.33 SLEEP APNEA, OBSTRUCTIVE: Primary | ICD-10-CM

## 2023-04-03 ENCOUNTER — OFFICE VISIT (OUTPATIENT)
Dept: NEUROLOGY | Age: 68
End: 2023-04-03
Payer: MEDICARE

## 2023-04-03 VITALS
HEIGHT: 63 IN | DIASTOLIC BLOOD PRESSURE: 63 MMHG | WEIGHT: 260 LBS | SYSTOLIC BLOOD PRESSURE: 131 MMHG | HEART RATE: 60 BPM | RESPIRATION RATE: 20 BRPM | BODY MASS INDEX: 46.07 KG/M2

## 2023-04-03 DIAGNOSIS — I67.9 CEREBROVASCULAR SMALL VESSEL DISEASE: ICD-10-CM

## 2023-04-03 DIAGNOSIS — R41.3 MEMORY LOSS: ICD-10-CM

## 2023-04-03 DIAGNOSIS — G47.33 SLEEP APNEA, OBSTRUCTIVE: ICD-10-CM

## 2023-04-03 DIAGNOSIS — R26.0 ATAXIC GAIT: ICD-10-CM

## 2023-04-03 DIAGNOSIS — R51.9 CHRONIC DAILY HEADACHE: Primary | ICD-10-CM

## 2023-04-03 PROCEDURE — 99214 OFFICE O/P EST MOD 30 MIN: CPT | Performed by: PSYCHIATRY & NEUROLOGY

## 2023-04-03 PROCEDURE — 1123F ACP DISCUSS/DSCN MKR DOCD: CPT | Performed by: PSYCHIATRY & NEUROLOGY

## 2023-04-03 RX ORDER — RIMEGEPANT SULFATE 75 MG/75MG
75 TABLET, ORALLY DISINTEGRATING ORAL EVERY OTHER DAY
Qty: 16 TABLET | Refills: 5 | Status: SHIPPED | OUTPATIENT
Start: 2023-04-03 | End: 2023-09-30

## 2023-05-04 DIAGNOSIS — G47.33 SLEEP APNEA, OBSTRUCTIVE: Primary | ICD-10-CM

## 2023-05-04 NOTE — PROGRESS NOTES
[place] [address]  [place], [address]  Phone [phone] Fax [phone]     Patient Name: Rosetta Pedroza 2023  : 1955  Age: 78 y.o.   Patient Address: [address] [place] [address]       Patient Phone: [phone] (home)     REFERRAL  Referred to: DME provider of patient's choice  Rosetta Pedroza is referred for the following:    DME Equipment HPCPS Code Setting   Auto Adjusting CPAP device with flex or comparable pressure relief per comfort  6cm to 16cm   Heated Humidifier  Patient Choice       Replinishible PAP Supplies, 1 year supply  Item HPCPS Code Frequency   Mask of choice  or  1 per 3 months   Nasal Mask cushion/pillows  or  2 per 30 days   Full Face Mask Interface  1 per 30 days   Headgear  1 per 6 months   Tubing, length of choice  or  1 per 3 months   Water Chamber  1 per 6 months   Chinstrap  1 per 6 months   Disposable Filters  2 per 30 days   Reusable Filters  1 per 6 months     Diagnoses:  Obstructive sleep apnea (G47.33)  Length of Need: Lifetime, 99    Ordering Provider: CANDY See Grise: [id number]         Signature:       Date: 2023   Electronically Signed by Rayray Jarvis M.D.

## 2023-05-18 ENCOUNTER — OFFICE VISIT (OUTPATIENT)
Dept: ONCOLOGY | Facility: CLINIC | Age: 68
End: 2023-05-18

## 2023-05-18 ENCOUNTER — LAB (OUTPATIENT)
Dept: LAB | Facility: HOSPITAL | Age: 68
End: 2023-05-18
Payer: MEDICARE

## 2023-05-18 VITALS
SYSTOLIC BLOOD PRESSURE: 148 MMHG | DIASTOLIC BLOOD PRESSURE: 88 MMHG | HEART RATE: 62 BPM | BODY MASS INDEX: 47.01 KG/M2 | RESPIRATION RATE: 16 BRPM | OXYGEN SATURATION: 97 % | WEIGHT: 265.3 LBS | TEMPERATURE: 97 F | HEIGHT: 63 IN

## 2023-05-18 DIAGNOSIS — D50.9 IRON DEFICIENCY ANEMIA, UNSPECIFIED IRON DEFICIENCY ANEMIA TYPE: Primary | ICD-10-CM

## 2023-05-18 DIAGNOSIS — E53.8 B12 DEFICIENCY: ICD-10-CM

## 2023-05-18 DIAGNOSIS — N18.2 CHRONIC KIDNEY DISEASE (CKD), STAGE II (MILD): ICD-10-CM

## 2023-05-18 DIAGNOSIS — K76.0 NON-ALCOHOLIC FATTY LIVER DISEASE: ICD-10-CM

## 2023-05-18 DIAGNOSIS — E66.01 CLASS 3 SEVERE OBESITY WITH SERIOUS COMORBIDITY AND BODY MASS INDEX (BMI) OF 45.0 TO 49.9 IN ADULT, UNSPECIFIED OBESITY TYPE: ICD-10-CM

## 2023-05-18 DIAGNOSIS — N18.31 STAGE 3A CHRONIC KIDNEY DISEASE: ICD-10-CM

## 2023-05-18 LAB
ALBUMIN SERPL-MCNC: 4.4 G/DL (ref 3.5–5.2)
ALBUMIN/GLOB SERPL: 1.5 G/DL
ALP SERPL-CCNC: 126 U/L (ref 39–117)
ALT SERPL W P-5'-P-CCNC: 29 U/L (ref 1–33)
ANION GAP SERPL CALCULATED.3IONS-SCNC: 11 MMOL/L (ref 5–15)
AST SERPL-CCNC: 27 U/L (ref 1–32)
BASOPHILS # BLD AUTO: 0.03 10*3/MM3 (ref 0–0.2)
BASOPHILS NFR BLD AUTO: 0.8 % (ref 0–1.5)
BILIRUB SERPL-MCNC: 0.8 MG/DL (ref 0–1.2)
BUN SERPL-MCNC: 25 MG/DL (ref 8–23)
BUN/CREAT SERPL: 20.5 (ref 7–25)
CALCIUM SPEC-SCNC: 9.9 MG/DL (ref 8.6–10.5)
CHLORIDE SERPL-SCNC: 101 MMOL/L (ref 98–107)
CO2 SERPL-SCNC: 28 MMOL/L (ref 22–29)
CREAT SERPL-MCNC: 1.22 MG/DL (ref 0.57–1)
DEPRECATED RDW RBC AUTO: 41.2 FL (ref 37–54)
EGFRCR SERPLBLD CKD-EPI 2021: 48.7 ML/MIN/1.73
EOSINOPHIL # BLD AUTO: 0.09 10*3/MM3 (ref 0–0.4)
EOSINOPHIL NFR BLD AUTO: 2.3 % (ref 0.3–6.2)
ERYTHROCYTE [DISTWIDTH] IN BLOOD BY AUTOMATED COUNT: 11.8 % (ref 12.3–15.4)
FERRITIN SERPL-MCNC: 199.9 NG/ML (ref 13–150)
GLOBULIN UR ELPH-MCNC: 3 GM/DL
GLUCOSE SERPL-MCNC: 176 MG/DL (ref 65–99)
HCT VFR BLD AUTO: 36.5 % (ref 34–46.6)
HGB BLD-MCNC: 11.5 G/DL (ref 12–15.9)
HOLD SPECIMEN: NORMAL
IMM GRANULOCYTES # BLD AUTO: 0.01 10*3/MM3 (ref 0–0.05)
IMM GRANULOCYTES NFR BLD AUTO: 0.3 % (ref 0–0.5)
IRON 24H UR-MRATE: 81 MCG/DL (ref 37–145)
IRON SATN MFR SERPL: 18 % (ref 20–50)
LYMPHOCYTES # BLD AUTO: 1.26 10*3/MM3 (ref 0.7–3.1)
LYMPHOCYTES NFR BLD AUTO: 32.9 % (ref 19.6–45.3)
MCH RBC QN AUTO: 30.1 PG (ref 26.6–33)
MCHC RBC AUTO-ENTMCNC: 31.5 G/DL (ref 31.5–35.7)
MCV RBC AUTO: 95.5 FL (ref 79–97)
MONOCYTES # BLD AUTO: 0.35 10*3/MM3 (ref 0.1–0.9)
MONOCYTES NFR BLD AUTO: 9.1 % (ref 5–12)
NEUTROPHILS NFR BLD AUTO: 2.09 10*3/MM3 (ref 1.7–7)
NEUTROPHILS NFR BLD AUTO: 54.6 % (ref 42.7–76)
NRBC BLD AUTO-RTO: 0 /100 WBC (ref 0–0.2)
PLATELET # BLD AUTO: 163 10*3/MM3 (ref 140–450)
PMV BLD AUTO: 9.5 FL (ref 6–12)
POTASSIUM SERPL-SCNC: 4.2 MMOL/L (ref 3.5–5.2)
PROT SERPL-MCNC: 7.4 G/DL (ref 6–8.5)
RBC # BLD AUTO: 3.82 10*6/MM3 (ref 3.77–5.28)
SODIUM SERPL-SCNC: 140 MMOL/L (ref 136–145)
TIBC SERPL-MCNC: 447 MCG/DL (ref 298–536)
TRANSFERRIN SERPL-MCNC: 300 MG/DL (ref 200–360)
WBC NRBC COR # BLD: 3.83 10*3/MM3 (ref 3.4–10.8)

## 2023-05-18 PROCEDURE — 80053 COMPREHEN METABOLIC PANEL: CPT

## 2023-05-18 PROCEDURE — 82728 ASSAY OF FERRITIN: CPT

## 2023-05-18 PROCEDURE — 84466 ASSAY OF TRANSFERRIN: CPT

## 2023-05-18 PROCEDURE — 85025 COMPLETE CBC W/AUTO DIFF WBC: CPT

## 2023-05-18 PROCEDURE — 36415 COLL VENOUS BLD VENIPUNCTURE: CPT

## 2023-05-18 PROCEDURE — 83540 ASSAY OF IRON: CPT

## 2023-05-18 RX ORDER — AMITRIPTYLINE HYDROCHLORIDE 25 MG/1
2 TABLET, FILM COATED ORAL NIGHTLY
COMMUNITY
Start: 2023-01-03

## 2023-05-18 NOTE — PROGRESS NOTES
[facility]AT[facility]Y & [facility]COLOGY  [address] [facility] [address]  [facility] [place] [address] [phone]    Patient Name: Mariia Parker  Encounter Date: 05/18/2023  YOB: 1955  Patient Number: [id number]    Hematology / Oncology Progress Note       REASON FOR VISIT: Ms. Mariia Parker is a 68 y.o.female who followed by this officefor pancytopenia from hypersplenism that is secondary to nonalcoholic fatty liver disease. She is also seen for multifactoral anemia from iron deficiency, hypersplenism and CKD stage III. She was intolerant to oral iron therapy and was switched to Nulecit in August 2019. She has not required iron replacement since.    PREVIOUS INTERVENTIONS:   2 units PRBCs 10/13/2016 at [facility].  Procrit 40,000 units subcutaneously 10/13/2016 through present at [facility]. Details not available.  Ferrous sulfate 325 mg 07/20/2017 through 01/22/2018. Resume 05/21/2018 through 09/17/2018. Resumed 11/21/2018 through 12/06/2018. Resume 05/13/2019.  Nulecit 125mg weekly x3 8/13/19, 8/20/19, 8/27/19      INTERVAL HISTORY    She presents to clinic today for continued follow up. She has no acute complaints today.   She denies any nausea, vomiting, hematuria or blood in stool.      She had labs drawn and results were reviewed with patient in the office         LABS    Lab Results - Last 18 Months   Lab Units 05/18/23  0959 11/17/22  1002 05/18/22  1041   HEMOGLOBIN g/dL 11.5* 11.6* 12.2   HEMATOCRIT % 36.5 37.3 39.0   MCV fL 95.5 98.2* 96.1   WBC 10*3/mm3 3.83 5.09 4.18   RDW % 11.8* 12.1* 13.1   MPV fL 9.5 10.1 10.2   PLATELETS 10*3/mm3 163 162 187   IMM GRAN % % 0.3 0.4 0.2   NEUTROS ABS 10*3/mm3 2.09 3.17 2.45   LYMPHS ABS 10*3/mm3 1.26 1.33 1.11   MONOS ABS 10*3/mm3 0.35 0.44 0.49   EOS ABS 10*3/mm3 0.09 0.10 0.09   BASOS ABS 10*3/mm3 0.03 0.03 0.03   IMMATURE GRANS (ABS) 10*3/mm3 0.01 0.02 0.01    NRBC /100 WBC 0.0 0.0 0.0       Lab Results - Last 18 Months   Lab Units 05/18/23  0959 11/17/22  1002 05/18/22  1041   GLUCOSE mg/dL 176* 161* 129*   SODIUM mmol/L 140 144 142   POTASSIUM mmol/L 4.2 4.4 4.0   CO2 mmol/L 28.0 31.0* 22.0   CHLORIDE mmol/L 101 105 109*   ANION GAP mmol/L 11.0 8.0 11.0   CREATININE mg/dL 1.22* 1.01* 1.10*   BUN mg/dL 25* 17 19   BUN / CREAT RATIO  20.5 16.8 17.3   CALCIUM mg/dL 9.9 9.5 9.8   ALK PHOS U/L 126* 127* 111   TOTAL PROTEIN g/dL 7.4 7.5 7.1   ALT (SGPT) U/L 29 21 19   AST (SGOT) U/L 27 22 21   BILIRUBIN mg/dL 0.8 1.0 0.7   ALBUMIN g/dL 4.4 4.40 4.40   GLOBULIN gm/dL 3.0 3.1 2.7       No results for input(s): MSPIKE, KAPPALAMB, IGLFLC, URICACID, FREEKAPPAL, CEA, LDH, REFLABREPO in the last 20963 hours.    Lab Results - Last 18 Months   Lab Units 05/18/23  0959 11/17/22  1002 05/18/22  1041   IRON mcg/dL 81 69 64   TIBC mcg/dL 447 431 450   IRON SATURATION % 18* 16* 14*   FERRITIN ng/mL 199.90* 161.40* 172.10*   FOLATE ng/mL  --  19.30 13.70         PAST MEDICAL HISTORY:  ALLERGIES:  No Known Allergies  CURRENT MEDICATIONS:  Outpatient Encounter Medications as of 5/18/2023   Medication Sig Dispense Refill    acetaminophen (TYLENOL) 650 MG 8 hr tablet Take 1 tablet by mouth Every 8 (Eight) Hours As Needed for Mild Pain.      amitriptyline (ELAVIL) 25 MG tablet Take 2 tablets by mouth Every Night.      amLODIPine (NORVASC) 10 MG tablet Take 1 tablet by mouth Daily.      atorvastatin (LIPITOR) 40 MG tablet Take 1 tablet by mouth Daily.      carvedilol (COREG) 25 MG tablet Take 1 tablet by mouth 2 (Two) Times a Day With Meals.      colestipol (COLESTID) 1 g tablet Take 1 tablet by mouth 2 (Two) Times a Day.      FLUoxetine (PROzac) 20 MG capsule Take 2 capsules by mouth Daily.      furosemide (LASIX) 20 MG tablet Take 1 tablet by mouth Daily. 1 or 2 tablets daily      gabapentin (NEURONTIN) 300 MG capsule Take 3 capsules by mouth 2 (Two) Times a Day.      linagliptin (TRADJENTA)  5 MG tablet tablet Take 1 tablet by mouth Daily.      lisinopril (PRINIVIL,ZESTRIL) 20 MG tablet Take 1 tablet by mouth Daily.      Multiple Vitamins-Minerals (MULTIVITAMIN ADULT PO) Take  by mouth.      pantoprazole (PROTONIX) 40 MG EC tablet Take 1 tablet by mouth Daily.      Rimegepant Sulfate (Nurtec) 75 MG tablet dispersible tablet Take  by mouth.      VITAMIN D, ERGOCALCIFEROL, PO Take  by mouth Daily.      Wheat Dextrin (BENEFIBER PO) Take  by mouth As Needed.      XARELTO 20 MG tablet 1 tablet Daily With Dinner.       No facility-administered encounter medications on file as of 5/18/2023.     ADULT ILLNESSES:  Patient Active Problem List   Diagnosis Code    Body mass index (BMI) of 50-59.9 in adult Z68.43    Stage 3 chronic kidney disease N18.30    Pancytopenia D61.818    Hypersplenism D73.1    Hypertension I10    Non-alcoholic fatty liver disease K76.0    Anemia in stage 3 chronic kidney disease N18.30, D63.1     SURGERIES:  Past Surgical History:   Procedure Laterality Date    CARPAL TUNNEL RELEASE Right     ENDOSCOPY AND COLONOSCOPY  03/2016    FOREIGN BODY REMOVAL Right     leg    HYSTERECTOMY      open; total    LAPAROSCOPIC CHOLECYSTECTOMY      SMALL INTESTINE SURGERY  06/2016    UMBILICAL HERNIA REPAIR      not sure if has mesh in there or not (x4 surgeries)      HEALTH MAINTENANCE ITEMS:  Health Maintenance Due   Topic Date Due    URINE MICROALBUMIN  Never done    DXA SCAN  Never done    COLORECTAL CANCER SCREENING  Never done    COVID-19 Vaccine (1) Never done    Pneumococcal Vaccine 65+ (1 - PCV) Never done    ZOSTER VACCINE (1 of 2) Never done    TDAP/TD VACCINES (2 - Tdap) 11/07/2007    HEPATITIS C SCREENING  Never done    ANNUAL WELLNESS VISIT  Never done    DIABETIC FOOT EXAM  Never done    PAP SMEAR  Never done    LIPID PANEL  Never done    DIABETIC EYE EXAM  Never done    MAMMOGRAM  10/26/2018    HEMOGLOBIN A1C  12/12/2019       <no information>  Last Completed Colonoscopy       This patient  "has no relevant Health Maintenance data.            There is no immunization history on file for this patient.  Last Completed Mammogram       This patient has no relevant Health Maintenance data.              FAMILY HISTORY:  Family History   Problem Relation Age of Onset    Cancer Mother     Hypertension Mother     Heart disease Mother     Breast cancer Mother     Dementia Mother     Parkinsonism Mother     No Known Problems Father     Hypertension Brother     Other Maternal Grandmother         brain tumor    No Known Problems Maternal Grandfather     No Known Problems Paternal Grandmother     No Known Problems Paternal Grandfather     No Known Problems Brother      SOCIAL HISTORY:  Social History     Socioeconomic History    Marital status:    Tobacco Use    Smoking status: Never    Smokeless tobacco: Never   Substance and Sexual Activity    Alcohol use: No    Drug use: No    Sexual activity: Defer     Birth control/protection: Surgical       REVIEW OF SYSTEMS:  Review of Systems   Constitutional:  Negative for fatigue and fever.   HENT:  Negative for trouble swallowing.    Respiratory:  Negative for cough and shortness of breath.    Cardiovascular:  Negative for chest pain, palpitations and leg swelling.   Gastrointestinal:  Negative for nausea and vomiting.   Genitourinary:  Negative for hematuria.   Musculoskeletal:  Negative for arthralgias and myalgias.   Skin:  Negative for rash, skin lesions and wound.   Neurological:  Negative for dizziness, syncope, memory problem and confusion.   Psychiatric/Behavioral:  Negative for suicidal ideas and depressed mood. The patient is not nervous/anxious.      /88   Pulse 62   Temp 97 °F (36.1 °C)   Resp 16   Ht 160 cm (63\")   Wt 120 kg (265 lb 4.8 oz)   SpO2 97%   BMI 47.00 kg/m²  Body surface area is 2.18 meters squared.    Pain Score    05/18/23 1027   PainSc: 0-No pain       Physical Exam  Constitutional:       Appearance: Normal appearance. She is " obese.      Comments: Lost 5 pounds since last visit    HENT:      Head: Normocephalic and atraumatic.   Cardiovascular:      Rate and Rhythm: Normal rate and regular rhythm.   Pulmonary:      Effort: Pulmonary effort is normal.      Breath sounds: Normal breath sounds.   Abdominal:      General: Bowel sounds are normal.      Palpations: Abdomen is soft.      Hernia: A hernia is present.   Musculoskeletal:      Right lower leg: No edema.      Left lower leg: No edema.   Skin:     General: Skin is warm and dry.   Neurological:      Mental Status: She is alert and oriented to person, place, and time.   Psychiatric:         Attention and Perception: Attention normal.         Mood and Affect: Mood normal.         Judgment: Judgment normal.       Mariia Parker reports a pain score of . No intervention indicated.      ASSESSMENT / PLAN    1. Iron deficiency anemia, unspecified iron deficiency anemia type    2. Stage 3a chronic kidney disease    3. Non-alcoholic fatty liver disease    4. Class 3 severe obesity with serious comorbidity and body mass index (BMI) of 45.0 to 49.9 in adult, unspecified obesity type    5. B12 deficiency        ASSESSMENT:      1. 2.  Iron Deficiency / Anemia in CKD  -Pt has multifactorial Anemia with intermittent iron deficiency and chronic kidney disease Stage IIIa  -Followed by Dr. Sánchez, Nephrology   -Labs today Hgb 11.5, Hct 36.5, Iron 81, Ferritin 199, Sat 18%, TIBC 447  -Stable for observation     3.  Fatty Liver Disease   - Non-alcoholic fatty liver disease;  - Alk Phos 126  Stable    4.  Obesity   Patient's (Body mass index is 47 kg/m².) indicates that they are morbidly obese (BMI > 40 or > 35 with obesity - related health condition) with health related conditions that include hypertension, diabetes mellitus, and GERD . Weight is improving with lifestyle modifications.  -Pt lost 5 poounds since November   - Defer management to PCP      PLAN:   Stable for observation  Continue  current medications, treatment plans and follow up with PCP and any other providers  Labs only in 3 months.  [place] [facility]  Return to office in 6 months for continued follow up.  Pre-office labs for CBC, CMP, Iron Profile, Ferritin  Care discussed with patient.  Understanding expressed.  Patient agreeable with plan.            Sadie Campbell, APRN  05/18/2023

## 2023-07-03 ENCOUNTER — OFFICE VISIT (OUTPATIENT)
Dept: NEUROLOGY | Age: 68
End: 2023-07-03
Payer: MEDICARE

## 2023-07-03 VITALS
HEIGHT: 63 IN | HEART RATE: 67 BPM | OXYGEN SATURATION: 98 % | SYSTOLIC BLOOD PRESSURE: 126 MMHG | DIASTOLIC BLOOD PRESSURE: 76 MMHG | BODY MASS INDEX: 45.71 KG/M2 | WEIGHT: 258 LBS

## 2023-07-03 DIAGNOSIS — G47.33 SLEEP APNEA, OBSTRUCTIVE: ICD-10-CM

## 2023-07-03 DIAGNOSIS — I67.9 CEREBROVASCULAR SMALL VESSEL DISEASE: ICD-10-CM

## 2023-07-03 DIAGNOSIS — R41.3 MEMORY LOSS: ICD-10-CM

## 2023-07-03 DIAGNOSIS — R51.9 CHRONIC DAILY HEADACHE: Primary | ICD-10-CM

## 2023-07-03 PROCEDURE — 1123F ACP DISCUSS/DSCN MKR DOCD: CPT | Performed by: PSYCHIATRY & NEUROLOGY

## 2023-07-03 PROCEDURE — 99214 OFFICE O/P EST MOD 30 MIN: CPT | Performed by: PSYCHIATRY & NEUROLOGY

## 2023-07-03 RX ORDER — GALCANEZUMAB 120 MG/ML
INJECTION, SOLUTION SUBCUTANEOUS
Qty: 2 ADJUSTABLE DOSE PRE-FILLED PEN SYRINGE | Refills: 5 | Status: SHIPPED | OUTPATIENT
Start: 2023-07-03

## 2023-07-03 RX ORDER — PREGABALIN 25 MG/1
CAPSULE ORAL
COMMUNITY
Start: 2023-06-26

## 2023-07-03 NOTE — PATIENT INSTRUCTIONS
INSTRUCTIONS:  1. Continue using the CPAP during sleep  2. Continue the amitriptyline at bedtime  3. Take an 81 mg aspirin daily  4. Start Emgality for headache prevention, 120 mg SQ every month (give two injections the first day, then one every month).

## 2023-07-03 NOTE — PROGRESS NOTES
[facility]  [address] [facility], [address] [place] [address]  Phone [phone]  Fax [phone]     [facility] Follow Up Encounter  7/3/23 11:18 AM CDT    Information:   Patient Name: Pravin Real  :   1955  Age:   80 y.o. MRN:   [id number]  Account #:  [de-identified]  Today:  7/3/23    Provider: Parrish Le M.D. Chief Complaint:   Chief Complaint   Patient presents with    Follow-up    Headache       Subjective: Pravin Real is a 80 y.o. woman with a history of chronic daily headaches, small vessel cerebrovascular disease, memory loss, diabetic polyneuropathy, and obstructive sleep apnea who is following up. She continues to have daily headaches. These are global and dull but they get worse with right frontal squeezing  pains. She has not found any alleviating or aggravating features. She has recently seen an eye doctor. Her memory is poor. She remains independent. She has had some pain and numbness in her feet and ankles. She has had no stroke symptoms. She was having hypersomnia and had a polysomnogram that did show obstructive sleep apnea. She is using CPAP. She is resting better and is more alert in the daytime. Objective:     Past Medical History:  Past Medical History:   Diagnosis Date    Arthritis     Diabetes mellitus ([address])     Dizziness     Hearing loss     Hyperlipidemia     Hypertension     Pulmonary embolism ([address])        Past Surgical History:   Procedure Laterality Date    CHOLECYSTECTOMY      HERNIA REPAIR      HYSTERECTOMY, TOTAL ABDOMINAL (CERVIX REMOVED)         Recent Hospitalizations  None    Significant Injuries  None    Habits  Pravin Real reports that she has never smoked. She has never used smokeless tobacco. She reports that she does not drink alcohol and does not use drugs. No family history on file. Social History  Pravin Real is , lives in [place], [place], and is retired.     Medications:  Current

## 2023-07-17 ENCOUNTER — TELEPHONE (OUTPATIENT)
Dept: NEUROLOGY | Age: 68
End: 2023-07-17

## 2023-07-17 NOTE — TELEPHONE ENCOUNTER
PA Case: [id number], Status: Approved, Coverage Starts on: 4/17/2023 12:00:00 AM, Coverage Ends on: 10/15/2023 12:00:00 AM.

## 2023-10-16 ENCOUNTER — OFFICE VISIT (OUTPATIENT)
Dept: NEUROLOGY | Age: 68
End: 2023-10-16
Payer: MEDICARE

## 2023-10-16 VITALS
SYSTOLIC BLOOD PRESSURE: 136 MMHG | HEIGHT: 63 IN | WEIGHT: 260 LBS | BODY MASS INDEX: 46.07 KG/M2 | RESPIRATION RATE: 18 BRPM | DIASTOLIC BLOOD PRESSURE: 66 MMHG | HEART RATE: 60 BPM

## 2023-10-16 DIAGNOSIS — I67.9 CEREBROVASCULAR SMALL VESSEL DISEASE: ICD-10-CM

## 2023-10-16 DIAGNOSIS — R51.9 CHRONIC DAILY HEADACHE: Primary | ICD-10-CM

## 2023-10-16 DIAGNOSIS — E11.42 DIABETIC POLYNEUROPATHY ASSOCIATED WITH TYPE 2 DIABETES MELLITUS (HCC): ICD-10-CM

## 2023-10-16 DIAGNOSIS — G47.33 SLEEP APNEA, OBSTRUCTIVE: ICD-10-CM

## 2023-10-16 DIAGNOSIS — R41.3 MEMORY LOSS: ICD-10-CM

## 2023-10-16 PROCEDURE — 99214 OFFICE O/P EST MOD 30 MIN: CPT | Performed by: PSYCHIATRY & NEUROLOGY

## 2023-10-16 PROCEDURE — 1123F ACP DISCUSS/DSCN MKR DOCD: CPT | Performed by: PSYCHIATRY & NEUROLOGY

## 2023-10-16 RX ORDER — AMITRIPTYLINE HYDROCHLORIDE 75 MG/1
75 TABLET ORAL NIGHTLY
Qty: 30 TABLET | Refills: 5 | Status: SHIPPED | OUTPATIENT
Start: 2023-10-16

## 2023-10-16 RX ORDER — DONEPEZIL HYDROCHLORIDE 5 MG/1
5 TABLET, FILM COATED ORAL NIGHTLY
Qty: 30 TABLET | Refills: 5 | Status: SHIPPED | OUTPATIENT
Start: 2023-10-16

## 2023-10-16 NOTE — PATIENT INSTRUCTIONS
INSTRUCTIONS:  Continue CPAP use during sleep  Continue the Emgality  Increase the amitriptyline to 75 mg at bedtime  Start taking Aricept (donepezil) 5 mg at bedimte  Consider increasing the Lyrica to 75 mg twice a day

## 2023-10-16 NOTE — PROGRESS NOTES
[facility]  [address]  Phone [phone]  Fax [phone]     [facility] Follow Up Encounter  10/16/23 12:46 PM CDT    Information:   Patient Name: Fausto Maldonado  :   1955  Age:   80 y.o. MRN:   [id number]  Account #:  [de-identified]  Today:  10/16/23    Provider: Jerry Simmons M.D. Chief Complaint:   Chief Complaint   Patient presents with    Follow-up    Headache       Subjective: Fausto Maldonado is a 80 y.o. woman with chronic headaches, small vessel cerebrovascular disease, memory loss, diabetic polyneuropathy and obstructive sleep apnea who is following up. Last visit, she was started on Emgality. It has helped her headaches some. She continues to have frequent headaches but not daily and they are not as severe. She tolerates the [facility]. Her  says that her memory is getting worse. She remains independent. She has had some pain and numbness in her feet and ankles. She has had no stroke symptoms. She uses her CPAP nightly and rests well. Objective:     Past Medical History:  Past Medical History:   Diagnosis Date    Arthritis     Diabetes mellitus ([address])     Dizziness     Hearing loss     Hyperlipidemia     Hypertension     Pulmonary embolism ([address])        Past Surgical History:   Procedure Laterality Date    CHOLECYSTECTOMY      HERNIA REPAIR      HYSTERECTOMY, TOTAL ABDOMINAL (CERVIX REMOVED)         Recent Hospitalizations  None    Significant Injuries  None    Habits  Fausto Maldonado reports that she has never smoked. She has never used smokeless tobacco. She reports that she does not drink alcohol and does not use drugs. No family history on file. Social History  Fausto Maldonado is , lives in [place], [place], and is retired.     Medications:  Current Outpatient Medications   Medication Sig Dispense Refill    pregabalin (LYRICA) 25 MG capsule       Galcanezumab-gnlm (EMGALITY) 120 MG/ML SOAJ

## 2023-11-08 ENCOUNTER — TELEPHONE (OUTPATIENT)
Dept: NEUROLOGY | Age: 68
End: 2023-11-08

## 2023-11-08 NOTE — TELEPHONE ENCOUNTER
Ot called to say That her Emgalaty, is not covered by her Insurance, she said she has gotten three or 4 times, Please call to se you can help with this.

## 2023-11-08 NOTE — TELEPHONE ENCOUNTER
PA Case: [id number], Status: Approved, Coverage Starts on: 8/9/2023 12:00:00 AM, Coverage Ends on: 11/7/2024 12:00:00 AM      Emgality Approved.

## 2023-11-08 NOTE — TELEPHONE ENCOUNTER
Approvedtoday  PA Case: [id number], Status: Approved, Coverage Starts on: 8/9/2023 12:00:00 AM, Coverage Ends on: 11/7/2024 12:00:00 AM.      Nurtec 75mg approved.

## 2023-11-16 ENCOUNTER — LAB (OUTPATIENT)
Dept: LAB | Facility: HOSPITAL | Age: 68
End: 2023-11-16
Payer: MEDICARE

## 2023-11-16 ENCOUNTER — OFFICE VISIT (OUTPATIENT)
Dept: ONCOLOGY | Facility: CLINIC | Age: 68
End: 2023-11-16
Payer: MEDICARE

## 2023-11-16 VITALS
DIASTOLIC BLOOD PRESSURE: 62 MMHG | OXYGEN SATURATION: 97 % | RESPIRATION RATE: 16 BRPM | TEMPERATURE: 97.8 F | BODY MASS INDEX: 46.74 KG/M2 | HEART RATE: 68 BPM | SYSTOLIC BLOOD PRESSURE: 118 MMHG | WEIGHT: 263.8 LBS | HEIGHT: 63 IN

## 2023-11-16 DIAGNOSIS — N18.2 CHRONIC KIDNEY DISEASE (CKD), STAGE II (MILD): Primary | ICD-10-CM

## 2023-11-16 DIAGNOSIS — N18.31 STAGE 3A CHRONIC KIDNEY DISEASE: Primary | ICD-10-CM

## 2023-11-16 DIAGNOSIS — E53.8 B12 DEFICIENCY: ICD-10-CM

## 2023-11-16 DIAGNOSIS — E66.01 CLASS 3 SEVERE OBESITY WITH SERIOUS COMORBIDITY AND BODY MASS INDEX (BMI) OF 45.0 TO 49.9 IN ADULT, UNSPECIFIED OBESITY TYPE: ICD-10-CM

## 2023-11-16 DIAGNOSIS — N18.31 STAGE 3A CHRONIC KIDNEY DISEASE: ICD-10-CM

## 2023-11-16 DIAGNOSIS — D50.9 IRON DEFICIENCY ANEMIA, UNSPECIFIED IRON DEFICIENCY ANEMIA TYPE: ICD-10-CM

## 2023-11-16 LAB
ALBUMIN SERPL-MCNC: 4.1 G/DL (ref 3.5–5.2)
ALBUMIN/GLOB SERPL: 1.5 G/DL
ALP SERPL-CCNC: 90 U/L (ref 39–117)
ALT SERPL W P-5'-P-CCNC: 19 U/L (ref 1–33)
ANION GAP SERPL CALCULATED.3IONS-SCNC: 10 MMOL/L (ref 5–15)
AST SERPL-CCNC: 19 U/L (ref 1–32)
BASOPHILS # BLD AUTO: 0.03 10*3/MM3 (ref 0–0.2)
BASOPHILS NFR BLD AUTO: 0.5 % (ref 0–1.5)
BILIRUB SERPL-MCNC: 0.7 MG/DL (ref 0–1.2)
BUN SERPL-MCNC: 27 MG/DL (ref 8–23)
BUN/CREAT SERPL: 18 (ref 7–25)
CALCIUM SPEC-SCNC: 9.3 MG/DL (ref 8.6–10.5)
CHLORIDE SERPL-SCNC: 106 MMOL/L (ref 98–107)
CO2 SERPL-SCNC: 26 MMOL/L (ref 22–29)
CREAT SERPL-MCNC: 1.5 MG/DL (ref 0.57–1)
DEPRECATED RDW RBC AUTO: 45.1 FL (ref 37–54)
EGFRCR SERPLBLD CKD-EPI 2021: 38 ML/MIN/1.73
EOSINOPHIL # BLD AUTO: 0.04 10*3/MM3 (ref 0–0.4)
EOSINOPHIL NFR BLD AUTO: 0.6 % (ref 0.3–6.2)
ERYTHROCYTE [DISTWIDTH] IN BLOOD BY AUTOMATED COUNT: 12.7 % (ref 12.3–15.4)
FERRITIN SERPL-MCNC: 140.6 NG/ML (ref 13–150)
GLOBULIN UR ELPH-MCNC: 2.7 GM/DL
GLUCOSE SERPL-MCNC: 135 MG/DL (ref 65–99)
HCT VFR BLD AUTO: 34.9 % (ref 34–46.6)
HGB BLD-MCNC: 10.7 G/DL (ref 12–15.9)
HOLD SPECIMEN: NORMAL
IMM GRANULOCYTES # BLD AUTO: 0.03 10*3/MM3 (ref 0–0.05)
IMM GRANULOCYTES NFR BLD AUTO: 0.5 % (ref 0–0.5)
IRON 24H UR-MRATE: 97 MCG/DL (ref 37–145)
IRON SATN MFR SERPL: 25 % (ref 20–50)
LYMPHOCYTES # BLD AUTO: 1.5 10*3/MM3 (ref 0.7–3.1)
LYMPHOCYTES NFR BLD AUTO: 23.2 % (ref 19.6–45.3)
MCH RBC QN AUTO: 29.6 PG (ref 26.6–33)
MCHC RBC AUTO-ENTMCNC: 30.7 G/DL (ref 31.5–35.7)
MCV RBC AUTO: 96.7 FL (ref 79–97)
MONOCYTES # BLD AUTO: 0.54 10*3/MM3 (ref 0.1–0.9)
MONOCYTES NFR BLD AUTO: 8.3 % (ref 5–12)
NEUTROPHILS NFR BLD AUTO: 4.33 10*3/MM3 (ref 1.7–7)
NEUTROPHILS NFR BLD AUTO: 66.9 % (ref 42.7–76)
NRBC BLD AUTO-RTO: 0 /100 WBC (ref 0–0.2)
PLATELET # BLD AUTO: 174 10*3/MM3 (ref 140–450)
PMV BLD AUTO: 9.9 FL (ref 6–12)
POTASSIUM SERPL-SCNC: 3.7 MMOL/L (ref 3.5–5.2)
PROT SERPL-MCNC: 6.8 G/DL (ref 6–8.5)
RBC # BLD AUTO: 3.61 10*6/MM3 (ref 3.77–5.28)
SODIUM SERPL-SCNC: 142 MMOL/L (ref 136–145)
TIBC SERPL-MCNC: 393 MCG/DL (ref 298–536)
TRANSFERRIN SERPL-MCNC: 264 MG/DL (ref 200–360)
VIT B12 BLD-MCNC: 522 PG/ML (ref 211–946)
WBC NRBC COR # BLD AUTO: 6.47 10*3/MM3 (ref 3.4–10.8)

## 2023-11-16 PROCEDURE — 82668 ASSAY OF ERYTHROPOIETIN: CPT

## 2023-11-16 PROCEDURE — 36415 COLL VENOUS BLD VENIPUNCTURE: CPT

## 2023-11-16 PROCEDURE — 85025 COMPLETE CBC W/AUTO DIFF WBC: CPT

## 2023-11-16 PROCEDURE — 82728 ASSAY OF FERRITIN: CPT

## 2023-11-16 PROCEDURE — 83540 ASSAY OF IRON: CPT

## 2023-11-16 PROCEDURE — 84466 ASSAY OF TRANSFERRIN: CPT

## 2023-11-16 PROCEDURE — 82607 VITAMIN B-12: CPT

## 2023-11-16 PROCEDURE — 80053 COMPREHEN METABOLIC PANEL: CPT

## 2023-11-16 RX ORDER — PREGABALIN 25 MG/1
CAPSULE ORAL
COMMUNITY
Start: 2023-06-26

## 2023-11-16 NOTE — PROGRESS NOTES
[facility]  [address]  [place] [address] [phone]    Patient Name: Mariia Parker  Encounter Date: 11/16/2023  YOB: 1955  Patient Number: [id number]    Hematology / Oncology Progress Note       REASON FOR VISIT: Ms. Mariia Parker is a 68 y.o.female who followed by this officefor pancytopenia from hypersplenism that is secondary to nonalcoholic fatty liver disease. She is also seen for multifactoral anemia from iron deficiency, hypersplenism and CKD stage III. She was intolerant to oral iron therapy and was switched to Nulecit in August 2019. She has not required iron replacement since.    PREVIOUS INTERVENTIONS:   2 units PRBCs 10/13/2016 at [facility].  Procrit 40,000 units subcutaneously 10/13/2016 through present at [facility]. Details not available.  Ferrous sulfate 325 mg 07/20/2017 through 01/22/2018. Resume 05/21/2018 through 09/17/2018. Resumed 11/21/2018 through 12/06/2018. Resume 05/13/2019.  Nulecit 125mg weekly x3 8/13/19, 8/20/19, 8/27/19      INTERVAL HISTORY    She presents to clinic today for continued follow up. She has no acute complaints today.   She denies any nausea, vomiting, hematuria or blood in stool.      She had labs drawn and results were reviewed with patient in the office         LABS    Lab Results - Last 18 Months   Lab Units 11/16/23  1018 05/18/23  0959 11/17/22  1002   HEMOGLOBIN g/dL 10.7* 11.5* 11.6*   HEMATOCRIT % 34.9 36.5 37.3   MCV fL 96.7 95.5 98.2*   WBC 10*3/mm3 6.47 3.83 5.09   RDW % 12.7 11.8* 12.1*   MPV fL 9.9 9.5 10.1   PLATELETS 10*3/mm3 174 163 162   IMM GRAN % % 0.5 0.3 0.4   NEUTROS ABS 10*3/mm3 4.33 2.09 3.17   LYMPHS ABS 10*3/mm3 1.50 1.26 1.33   MONOS ABS 10*3/mm3 0.54 0.35 0.44   EOS ABS 10*3/mm3 0.04 0.09 0.10   BASOS ABS 10*3/mm3 0.03 0.03 0.03   IMMATURE GRANS (ABS) 10*3/mm3 0.03 0.01 0.02  "  NRBC /100 WBC 0.0 0.0 0.0       Lab Results - Last 18 Months   Lab Units 11/16/23  1018 05/18/23  0959 11/17/22  1002   GLUCOSE mg/dL 135* 176* 161*   SODIUM mmol/L 142 140 144   POTASSIUM mmol/L 3.7 4.2 4.4   CO2 mmol/L 26.0 28.0 31.0*   CHLORIDE mmol/L 106 101 105   ANION GAP mmol/L 10.0 11.0 8.0   CREATININE mg/dL 1.50* 1.22* 1.01*   BUN mg/dL 27* 25* 17   BUN / CREAT RATIO  18.0 20.5 16.8   CALCIUM mg/dL 9.3 9.9 9.5   ALK PHOS U/L 90 126* 127*   TOTAL PROTEIN g/dL 6.8 7.4 7.5   ALT (SGPT) U/L 19 29 21   AST (SGOT) U/L 19 27 22   BILIRUBIN mg/dL 0.7 0.8 1.0   ALBUMIN g/dL 4.1 4.4 4.40   GLOBULIN gm/dL 2.7 3.0 3.1       No results for input(s): \"MSPIKE\", \"KAPPALAMB\", \"IGLFLC\", \"URICACID\", \"FREEKAPPAL\", \"CEA\", \"LDH\", \"REFLABREPO\" in the last 96999 hours.    Lab Results - Last 18 Months   Lab Units 11/16/23  1018 05/18/23  0959 11/17/22  1002   IRON mcg/dL 97 81 69   TIBC mcg/dL 393 447 431   IRON SATURATION (TSAT) % 25 18* 16*   FERRITIN ng/mL 140.60 199.90* 161.40*   FOLATE ng/mL  --   --  19.30         PAST MEDICAL HISTORY:  ALLERGIES:  No Known Allergies  CURRENT MEDICATIONS:  Outpatient Encounter Medications as of 11/16/2023   Medication Sig Dispense Refill    acetaminophen (TYLENOL) 650 MG 8 hr tablet Take 1 tablet by mouth Every 8 (Eight) Hours As Needed for Mild Pain.      amitriptyline (ELAVIL) 25 MG tablet Take 2 tablets by mouth Every Night.      amLODIPine (NORVASC) 10 MG tablet Take 1 tablet by mouth Daily.      atorvastatin (LIPITOR) 40 MG tablet Take 1 tablet by mouth Daily.      carvedilol (COREG) 25 MG tablet Take 1 tablet by mouth 2 (Two) Times a Day With Meals.      colestipol (COLESTID) 1 g tablet Take 1 tablet by mouth 2 (Two) Times a Day.      FLUoxetine (PROzac) 20 MG capsule Take 2 capsules by mouth Daily.      furosemide (LASIX) 20 MG tablet Take 1 tablet by mouth Daily. 1 or 2 tablets daily      Galcanezumab-gnlm (EMGALITY SC) Inject  under the skin into the appropriate area as directed.  "     linagliptin (TRADJENTA) 5 MG tablet tablet Take 1 tablet by mouth Daily.      lisinopril (PRINIVIL,ZESTRIL) 20 MG tablet Take 1 tablet by mouth Daily.      Multiple Vitamins-Minerals (MULTIVITAMIN ADULT PO) Take  by mouth.      pantoprazole (PROTONIX) 40 MG EC tablet Take 1 tablet by mouth Daily.      pregabalin (LYRICA) 25 MG capsule       VITAMIN D, ERGOCALCIFEROL, PO Take  by mouth Daily.      Wheat Dextrin (BENEFIBER PO) Take  by mouth As Needed.      XARELTO 20 MG tablet 1 tablet Daily With Dinner.      [DISCONTINUED] gabapentin (NEURONTIN) 300 MG capsule Take 3 capsules by mouth 2 (Two) Times a Day. (Patient not taking: Reported on 11/16/2023)      [DISCONTINUED] Rimegepant Sulfate (Nurtec) 75 MG tablet dispersible tablet Take  by mouth. (Patient not taking: Reported on 11/16/2023)       No facility-administered encounter medications on file as of 11/16/2023.     ADULT ILLNESSES:  Patient Active Problem List   Diagnosis Code    Body mass index (BMI) of 50-59.9 in adult Z68.43    Stage 3 chronic kidney disease N18.30    Pancytopenia D61.818    Hypersplenism D73.1    Hypertension I10    Non-alcoholic fatty liver disease K76.0    Anemia in stage 3 chronic kidney disease N18.30, D63.1     SURGERIES:  Past Surgical History:   Procedure Laterality Date    CARPAL TUNNEL RELEASE Right     ENDOSCOPY AND COLONOSCOPY  03/2016    FOREIGN BODY REMOVAL Right     leg    HYSTERECTOMY      open; total    LAPAROSCOPIC CHOLECYSTECTOMY      SMALL INTESTINE SURGERY  06/2016    UMBILICAL HERNIA REPAIR      not sure if has mesh in there or not (x4 surgeries)      HEALTH MAINTENANCE ITEMS:  Health Maintenance Due   Topic Date Due    URINE MICROALBUMIN  Never done    DXA SCAN  Never done    COLORECTAL CANCER SCREENING  Never done    COVID-19 Vaccine (1) Never done    Pneumococcal Vaccine 65+ (1 - PCV) Never done    ZOSTER VACCINE (1 of 2) Never done    TDAP/TD VACCINES (2 - Tdap) 11/07/2007    HEPATITIS C SCREENING  Never done     ANNUAL WELLNESS VISIT  Never done    DIABETIC FOOT EXAM  Never done    PAP SMEAR  Never done    LIPID PANEL  Never done    DIABETIC EYE EXAM  Never done    MAMMOGRAM  10/26/2018    HEMOGLOBIN A1C  12/12/2019    BMI FOLLOWUP  09/23/2022    INFLUENZA VACCINE  Never done       <no information>  Last Completed Colonoscopy       This patient has no relevant Health Maintenance data.            There is no immunization history on file for this patient.  Last Completed Mammogram       This patient has no relevant Health Maintenance data.              FAMILY HISTORY:  Family History   Problem Relation Age of Onset    Cancer Mother     Hypertension Mother     Heart disease Mother     Breast cancer Mother     Dementia Mother     Parkinsonism Mother     No Known Problems Father     Hypertension Brother     Other Maternal Grandmother         brain tumor    No Known Problems Maternal Grandfather     No Known Problems Paternal Grandmother     No Known Problems Paternal Grandfather     No Known Problems Brother      SOCIAL HISTORY:  Social History     Socioeconomic History    Marital status:    Tobacco Use    Smoking status: Never    Smokeless tobacco: Never   Substance and Sexual Activity    Alcohol use: No    Drug use: No    Sexual activity: Defer     Birth control/protection: Surgical       REVIEW OF SYSTEMS:  Review of Systems   Constitutional:  Negative for fatigue and fever.   HENT:  Negative for trouble swallowing.    Respiratory:  Negative for cough and shortness of breath.    Cardiovascular:  Negative for chest pain, palpitations and leg swelling.   Gastrointestinal:  Negative for nausea and vomiting.   Genitourinary:  Negative for hematuria.   Musculoskeletal:  Negative for arthralgias and myalgias.   Skin:  Negative for rash, skin lesions and wound.   Neurological:  Negative for dizziness, syncope, memory problem and confusion.   Psychiatric/Behavioral:  Negative for suicidal ideas and depressed mood. The patient  "is not nervous/anxious.        /62   Pulse 68   Temp 97.8 °F (36.6 °C)   Resp 16   Ht 160 cm (63\")   Wt 120 kg (263 lb 12.8 oz)   SpO2 97%   BMI 46.73 kg/m²  Body surface area is 2.18 meters squared.    Pain Score    11/16/23 1022   PainSc: 0-No pain       Physical Exam  Constitutional:       Appearance: Normal appearance. She is obese.      Comments:     HENT:      Head: Normocephalic and atraumatic.   Cardiovascular:      Rate and Rhythm: Normal rate and regular rhythm.   Pulmonary:      Effort: Pulmonary effort is normal.      Breath sounds: Normal breath sounds.   Abdominal:      General: Bowel sounds are normal.      Palpations: Abdomen is soft.      Hernia: A hernia is present.   Musculoskeletal:      Right lower leg: No edema.      Left lower leg: No edema.   Skin:     General: Skin is warm and dry.   Neurological:      Mental Status: She is alert and oriented to person, place, and time.   Psychiatric:         Attention and Perception: Attention normal.         Mood and Affect: Mood normal.         Judgment: Judgment normal.         Mariia Parker reports a pain score of 0. No intervention indicated.      ASSESSMENT / PLAN    1. Stage 3a chronic kidney disease    2. Iron deficiency anemia, unspecified iron deficiency anemia type          ASSESSMENT:      1. 2.  Iron Deficiency / Anemia in CKD  -Pt has multifactorial Anemia with intermittent iron deficiency and chronic kidney disease Stage IIIa  -Followed by Dr. Sánchez, Nephrology    -Labs today:  Hgb 10.7, Hct 34.9, Iron 97, Ferritin 140, Sat 25%, TIBC 393.  BUN 27, Creatinine 1.50, GFR 38.0  -Stable for observation     3.  Obesity   Patient's (Body mass index is 46.73 kg/m².) indicates that they are morbidly obese (BMI > 40 or > 35 with obesity - related health condition) with health related conditions that include hypertension, diabetes mellitus, and GERD . Weight is improving with lifestyle modifications.  - Defer management to " PCP      PLAN:   Sent labs to PCP and Gonzalo,  Benjy for observation  Continue current medications, treatment plans and follow up with PCP and any other providers  Labs only in 3 months.  Orders given to have then drawn at [facility].  Return to office in 6 months for continued follow up.  Pre-office labs 1 week before  for CBC, CMP, Iron Profile, Ferritin  Care discussed with patient.  Understanding expressed.  Patient agreeable with plan.            Sadie Campbell, APRN  11/16/2023

## 2023-11-17 LAB — EPO SERPL-ACNC: 34.7 MIU/ML (ref 2.6–18.5)

## 2024-02-12 ENCOUNTER — TELEPHONE (OUTPATIENT)
Dept: NEUROLOGY | Age: 69
End: 2024-02-12

## 2024-02-12 NOTE — TELEPHONE ENCOUNTER
Spoke with patient and explained that Karla Krishnamurthy has not ordered any recent labwork. Patient voiced understanding and states she must've gotten something written down wrong. I advised pt to contact her PCP perhaps.

## 2024-02-12 NOTE — TELEPHONE ENCOUNTER
Patient called asking for the office to send her lab orders  to [facility] in [place].    Thank you!

## 2024-02-19 ENCOUNTER — TELEPHONE (OUTPATIENT)
Dept: NEUROLOGY | Age: 69
End: 2024-02-19

## 2024-02-19 NOTE — TELEPHONE ENCOUNTER
Sayra called to see if she can schedule appointment the same time her  is coming in May for appt with Dr. Krishnamurthy. Please advise.

## 2024-02-20 NOTE — TELEPHONE ENCOUNTER
Called the patient back to reschedule her appointment that she cancelled on 02/19/24. The patient appointment was rescheduled to 06/27/24 at 2:45 due to the provider not having an opening when  is coming in. The patient clarified with understanding about their appointment date and time.

## 2024-02-28 NOTE — TELEPHONE ENCOUNTER
Requested Prescriptions     Pending Prescriptions Disp Refills    Galcanezumab-gnlm (EMGALITY) 120 MG/ML SOAJ [Pharmacy Med Name: EMGALITY 120 MG/ML SOAJ 120 Solution Auto-injector] 1 mL 5     Sig: INJECT 1 SYRINGE ONCE A MONTH       Last Office Visit: 10/16/2023  Next Office Visit: 6/27/2024  /Last Medication Refill: 7/3/2023 with 5 RF

## 2024-02-29 RX ORDER — GALCANEZUMAB 120 MG/ML
INJECTION, SOLUTION SUBCUTANEOUS
Qty: 1 ML | Refills: 5 | Status: SHIPPED | OUTPATIENT
Start: 2024-02-29

## 2024-04-29 RX ORDER — GALCANEZUMAB 120 MG/ML
INJECTION, SOLUTION SUBCUTANEOUS
Qty: 1 ML | Refills: 5 | Status: SHIPPED | OUTPATIENT
Start: 2024-04-29

## 2024-04-29 NOTE — TELEPHONE ENCOUNTER
Sayra Dye has requested a refill on her medication.      Last office visit : 10/16/2023   Next office visit : 6/27/2024     Requested Prescriptions     Pending Prescriptions Disp Refills    EMGALITY 120 MG/ML SOAJ [Pharmacy Med Name: EMGALITY 120 MG/ML SOAJ 120 Solution Auto-injector] 1 mL 5     Sig: INJECT 1 SYRINGE ONCE A MONTH

## 2024-05-16 ENCOUNTER — LAB (OUTPATIENT)
Dept: LAB | Facility: HOSPITAL | Age: 69
End: 2024-05-16
Payer: MEDICARE

## 2024-05-16 ENCOUNTER — OFFICE VISIT (OUTPATIENT)
Dept: ONCOLOGY | Facility: CLINIC | Age: 69
End: 2024-05-16
Payer: MEDICARE

## 2024-05-16 VITALS
TEMPERATURE: 97.9 F | RESPIRATION RATE: 16 BRPM | SYSTOLIC BLOOD PRESSURE: 132 MMHG | OXYGEN SATURATION: 98 % | BODY MASS INDEX: 48.66 KG/M2 | HEART RATE: 66 BPM | HEIGHT: 63 IN | DIASTOLIC BLOOD PRESSURE: 84 MMHG | WEIGHT: 274.6 LBS

## 2024-05-16 DIAGNOSIS — N18.4 STAGE 4 CHRONIC KIDNEY DISEASE: Primary | ICD-10-CM

## 2024-05-16 DIAGNOSIS — D50.9 IRON DEFICIENCY ANEMIA, UNSPECIFIED IRON DEFICIENCY ANEMIA TYPE: ICD-10-CM

## 2024-05-16 DIAGNOSIS — N18.31 STAGE 3A CHRONIC KIDNEY DISEASE: ICD-10-CM

## 2024-05-16 LAB
ALBUMIN SERPL-MCNC: 4.2 G/DL (ref 3.5–5.2)
ALBUMIN/GLOB SERPL: 1.4 G/DL
ALP SERPL-CCNC: 97 U/L (ref 39–117)
ALT SERPL W P-5'-P-CCNC: 21 U/L (ref 1–33)
ANION GAP SERPL CALCULATED.3IONS-SCNC: 8 MMOL/L (ref 5–15)
AST SERPL-CCNC: 21 U/L (ref 1–32)
BASOPHILS # BLD AUTO: 0.04 10*3/MM3 (ref 0–0.2)
BASOPHILS NFR BLD AUTO: 0.5 % (ref 0–1.5)
BILIRUB SERPL-MCNC: 0.5 MG/DL (ref 0–1.2)
BUN SERPL-MCNC: 31 MG/DL (ref 8–23)
BUN/CREAT SERPL: 16.2 (ref 7–25)
CALCIUM SPEC-SCNC: 9.3 MG/DL (ref 8.6–10.5)
CHLORIDE SERPL-SCNC: 103 MMOL/L (ref 98–107)
CO2 SERPL-SCNC: 30 MMOL/L (ref 22–29)
CREAT SERPL-MCNC: 1.91 MG/DL (ref 0.57–1)
DEPRECATED RDW RBC AUTO: 43.9 FL (ref 37–54)
EGFRCR SERPLBLD CKD-EPI 2021: 28.3 ML/MIN/1.73
EOSINOPHIL # BLD AUTO: 0.09 10*3/MM3 (ref 0–0.4)
EOSINOPHIL NFR BLD AUTO: 1.2 % (ref 0.3–6.2)
ERYTHROCYTE [DISTWIDTH] IN BLOOD BY AUTOMATED COUNT: 12.2 % (ref 12.3–15.4)
FERRITIN SERPL-MCNC: 67.91 NG/ML (ref 13–150)
GLOBULIN UR ELPH-MCNC: 2.9 GM/DL
GLUCOSE SERPL-MCNC: 88 MG/DL (ref 65–99)
HCT VFR BLD AUTO: 35.4 % (ref 34–46.6)
HGB BLD-MCNC: 11.1 G/DL (ref 12–15.9)
IMM GRANULOCYTES # BLD AUTO: 0.02 10*3/MM3 (ref 0–0.05)
IMM GRANULOCYTES NFR BLD AUTO: 0.3 % (ref 0–0.5)
IRON 24H UR-MRATE: 92 MCG/DL (ref 37–145)
IRON SATN MFR SERPL: 22 % (ref 20–50)
LYMPHOCYTES # BLD AUTO: 1.72 10*3/MM3 (ref 0.7–3.1)
LYMPHOCYTES NFR BLD AUTO: 22.6 % (ref 19.6–45.3)
MCH RBC QN AUTO: 30.6 PG (ref 26.6–33)
MCHC RBC AUTO-ENTMCNC: 31.4 G/DL (ref 31.5–35.7)
MCV RBC AUTO: 97.5 FL (ref 79–97)
MONOCYTES # BLD AUTO: 0.68 10*3/MM3 (ref 0.1–0.9)
MONOCYTES NFR BLD AUTO: 8.9 % (ref 5–12)
NEUTROPHILS NFR BLD AUTO: 5.05 10*3/MM3 (ref 1.7–7)
NEUTROPHILS NFR BLD AUTO: 66.5 % (ref 42.7–76)
NRBC BLD AUTO-RTO: 0 /100 WBC (ref 0–0.2)
PLATELET # BLD AUTO: 205 10*3/MM3 (ref 140–450)
PMV BLD AUTO: 10.3 FL (ref 6–12)
POTASSIUM SERPL-SCNC: 3.9 MMOL/L (ref 3.5–5.2)
PROT SERPL-MCNC: 7.1 G/DL (ref 6–8.5)
RBC # BLD AUTO: 3.63 10*6/MM3 (ref 3.77–5.28)
SODIUM SERPL-SCNC: 141 MMOL/L (ref 136–145)
TIBC SERPL-MCNC: 413 MCG/DL (ref 298–536)
TRANSFERRIN SERPL-MCNC: 277 MG/DL (ref 200–360)
WBC NRBC COR # BLD AUTO: 7.6 10*3/MM3 (ref 3.4–10.8)

## 2024-05-16 PROCEDURE — 84466 ASSAY OF TRANSFERRIN: CPT

## 2024-05-16 PROCEDURE — 36415 COLL VENOUS BLD VENIPUNCTURE: CPT

## 2024-05-16 PROCEDURE — 80053 COMPREHEN METABOLIC PANEL: CPT

## 2024-05-16 PROCEDURE — 83540 ASSAY OF IRON: CPT

## 2024-05-16 PROCEDURE — 82728 ASSAY OF FERRITIN: CPT

## 2024-05-16 PROCEDURE — 85025 COMPLETE CBC W/AUTO DIFF WBC: CPT

## 2024-05-16 NOTE — PROGRESS NOTES
"[facility]  [address]  [place] [address] [phone]    Patient Name: Mariia Parker  Encounter Date: 05/16/2024  YOB: 1955  Patient Number: [id number]    Hematology / Oncology Progress Note       REASON FOR VISIT: Ms. Mariia Parker is a 69 y.o.female who followed by this officefor pancytopenia from hypersplenism that is secondary to nonalcoholic fatty liver disease. She is also seen for multifactoral anemia from iron deficiency, hypersplenism and CKD stage III. She was intolerant to oral iron therapy and was switched to Nulecit in August 2019. She has not required iron replacement since.    PREVIOUS INTERVENTIONS:   2 units PRBCs 10/13/2016 at [facility].  Procrit 40,000 units subcutaneously 10/13/2016 through present at [facility]. Details not available.  Ferrous sulfate 325 mg 07/20/2017 through 01/22/2018. Resume 05/21/2018 through 09/17/2018. Resumed 11/21/2018 through 12/06/2018. Resume 05/13/2019.  Nulecit 125mg weekly x3 8/13/19, 8/20/19, 8/27/19      INTERVAL HISTORY    She presents to clinic today for continued follow up. She has no acute complaints today.   She denies any nausea, vomiting, hematuria or blood in stool.      She complains of feeling \"tired and weak\"      She had labs drawn and results were reviewed with patient in the office         LABS    Lab Results - Last 18 Months   Lab Units 05/16/24  1114 11/16/23  1018 05/18/23  0959   HEMOGLOBIN g/dL 11.1* 10.7* 11.5*   HEMATOCRIT % 35.4 34.9 36.5   MCV fL 97.5* 96.7 95.5   WBC 10*3/mm3 7.60 6.47 3.83   RDW % 12.2* 12.7 11.8*   MPV fL 10.3 9.9 9.5   PLATELETS 10*3/mm3 205 174 163   IMM GRAN % % 0.3 0.5 0.3   NEUTROS ABS 10*3/mm3 5.05 4.33 2.09   LYMPHS ABS 10*3/mm3 1.72 1.50 1.26   MONOS ABS 10*3/mm3 0.68 0.54 0.35   EOS ABS 10*3/mm3 0.09 0.04 0.09   BASOS ABS 10*3/mm3 0.04 0.03 0.03 " "  IMMATURE GRANS (ABS) 10*3/mm3 0.02 0.03 0.01   NRBC /100 WBC 0.0 0.0 0.0       Lab Results - Last 18 Months   Lab Units 05/16/24  1114 11/16/23  1018 05/18/23  0959   GLUCOSE mg/dL 88 135* 176*   SODIUM mmol/L 141 142 140   POTASSIUM mmol/L 3.9 3.7 4.2   CO2 mmol/L 30.0* 26.0 28.0   CHLORIDE mmol/L 103 106 101   ANION GAP mmol/L 8.0 10.0 11.0   CREATININE mg/dL 1.91* 1.50* 1.22*   BUN mg/dL 31* 27* 25*   BUN / CREAT RATIO  16.2 18.0 20.5   CALCIUM mg/dL 9.3 9.3 9.9   ALK PHOS U/L 97 90 126*   TOTAL PROTEIN g/dL 7.1 6.8 7.4   ALT (SGPT) U/L 21 19 29   AST (SGOT) U/L 21 19 27   BILIRUBIN mg/dL 0.5 0.7 0.8   ALBUMIN g/dL 4.2 4.1 4.4   GLOBULIN gm/dL 2.9 2.7 3.0       No results for input(s): \"MSPIKE\", \"KAPPALAMB\", \"IGLFLC\", \"URICACID\", \"FREEKAPPAL\", \"CEA\", \"LDH\", \"REFLABREPO\" in the last 62915 hours.    Lab Results - Last 18 Months   Lab Units 05/16/24  1114 11/16/23  1018 05/18/23  0959   IRON mcg/dL 92 97 81   TIBC mcg/dL 413 393 447   IRON SATURATION (TSAT) % 22 25 18*   FERRITIN ng/mL 67.91 140.60 199.90*         PAST MEDICAL HISTORY:  ALLERGIES:  No Known Allergies  CURRENT MEDICATIONS:  Outpatient Encounter Medications as of 5/16/2024   Medication Sig Dispense Refill    acetaminophen (TYLENOL) 650 MG 8 hr tablet Take 1 tablet by mouth Every 8 (Eight) Hours As Needed for Mild Pain.      amitriptyline (ELAVIL) 25 MG tablet Take 2 tablets by mouth Every Night.      amLODIPine (NORVASC) 10 MG tablet Take 1 tablet by mouth Daily.      atorvastatin (LIPITOR) 40 MG tablet Take 1 tablet by mouth Daily.      carvedilol (COREG) 25 MG tablet Take 1 tablet by mouth 2 (Two) Times a Day With Meals.      colestipol (COLESTID) 1 g tablet Take 1 tablet by mouth 2 (Two) Times a Day.      FLUoxetine (PROzac) 20 MG capsule Take 2 capsules by mouth Daily.      furosemide (LASIX) 20 MG tablet Take 1 tablet by mouth Daily. 1 or 2 tablets daily      Galcanezumab-gnlm (EMGALITY SC) Inject  under the skin into the appropriate area as " directed.      linagliptin (TRADJENTA) 5 MG tablet tablet Take 1 tablet by mouth Daily.      lisinopril (PRINIVIL,ZESTRIL) 20 MG tablet Take 1 tablet by mouth Daily.      Multiple Vitamins-Minerals (MULTIVITAMIN ADULT PO) Take  by mouth.      pantoprazole (PROTONIX) 40 MG EC tablet Take 1 tablet by mouth Daily.      pregabalin (LYRICA) 25 MG capsule       VITAMIN D, ERGOCALCIFEROL, PO Take 2,000 mg by mouth Daily.      Wheat Dextrin (BENEFIBER PO) Take  by mouth As Needed.      XARELTO 20 MG tablet 1 tablet Daily With Dinner.       No facility-administered encounter medications on file as of 5/16/2024.     ADULT ILLNESSES:  Patient Active Problem List   Diagnosis Code    Body mass index (BMI) of 50-59.9 in adult Z68.43    Stage 3 chronic kidney disease N18.30    Pancytopenia D61.818    Hypersplenism D73.1    Hypertension I10    Non-alcoholic fatty liver disease K76.0    Anemia in stage 3 chronic kidney disease N18.30, D63.1     SURGERIES:  Past Surgical History:   Procedure Laterality Date    CARPAL TUNNEL RELEASE Right     ENDOSCOPY AND COLONOSCOPY  03/2016    FOREIGN BODY REMOVAL Right     leg    HYSTERECTOMY      open; total    LAPAROSCOPIC CHOLECYSTECTOMY      SMALL INTESTINE SURGERY  06/2016    UMBILICAL HERNIA REPAIR      not sure if has mesh in there or not (x4 surgeries)      HEALTH MAINTENANCE ITEMS:  Health Maintenance Due   Topic Date Due    LIPID PANEL  Never done    URINE MICROALBUMIN  Never done    DXA SCAN  Never done    COLORECTAL CANCER SCREENING  Never done    Pneumococcal Vaccine 65+ (1 of 2 - PCV) Never done    DIABETIC EYE EXAM  Never done    ZOSTER VACCINE (1 of 2) Never done    TDAP/TD VACCINES (2 - Tdap) 11/07/2007    RSV Vaccine - Adults (1 - 1-dose 60+ series) Never done    HEPATITIS C SCREENING  Never done    ANNUAL WELLNESS VISIT  Never done    DIABETIC FOOT EXAM  Never done    PAP SMEAR  Never done    MAMMOGRAM  10/26/2018    HEMOGLOBIN A1C  12/12/2019    BMI FOLLOWUP  09/23/2022     "COVID-19 Vaccine (1 - 2023-24 season) Never done       <no information>  Last Completed Colonoscopy       This patient has no relevant Health Maintenance data.            There is no immunization history on file for this patient.  Last Completed Mammogram       This patient has no relevant Health Maintenance data.              FAMILY HISTORY:  Family History   Problem Relation Age of Onset    Cancer Mother     Hypertension Mother     Heart disease Mother     Breast cancer Mother     Dementia Mother     Parkinsonism Mother     No Known Problems Father     Hypertension Brother     Other Maternal Grandmother         brain tumor    No Known Problems Maternal Grandfather     No Known Problems Paternal Grandmother     No Known Problems Paternal Grandfather     No Known Problems Brother      SOCIAL HISTORY:  Social History     Socioeconomic History    Marital status:    Tobacco Use    Smoking status: Never    Smokeless tobacco: Never   Substance and Sexual Activity    Alcohol use: No    Drug use: No    Sexual activity: Defer     Birth control/protection: Surgical       REVIEW OF SYSTEMS:  Review of Systems   Constitutional:  Negative for fatigue and fever.   HENT:  Negative for trouble swallowing.    Respiratory:  Negative for cough and shortness of breath.    Cardiovascular:  Negative for chest pain, palpitations and leg swelling.   Gastrointestinal:  Negative for nausea and vomiting.   Genitourinary:  Negative for hematuria.   Musculoskeletal:  Negative for arthralgias and myalgias.   Skin:  Negative for rash, skin lesions and wound.   Neurological:  Negative for dizziness, syncope, memory problem and confusion.   Psychiatric/Behavioral:  Negative for suicidal ideas and depressed mood. The patient is not nervous/anxious.        /84   Pulse 66   Temp 97.9 °F (36.6 °C)   Resp 16   Ht 160 cm (63\")   Wt 125 kg (274 lb 9.6 oz)   SpO2 98%   BMI 48.64 kg/m²  Body surface area is 2.22 meters squared.    Pain " Score    05/16/24 1023   PainSc:   5   PainLoc: Back         Physical Exam  Constitutional:       Appearance: Normal appearance. She is obese.      Comments:     HENT:      Head: Normocephalic and atraumatic.   Cardiovascular:      Rate and Rhythm: Normal rate and regular rhythm.   Pulmonary:      Effort: Pulmonary effort is normal.      Breath sounds: Normal breath sounds.   Abdominal:      General: Bowel sounds are normal.      Palpations: Abdomen is soft.      Hernia: A hernia is present.   Musculoskeletal:      Right lower leg: No edema.      Left lower leg: No edema.   Skin:     General: Skin is warm and dry.   Neurological:      Mental Status: She is alert and oriented to person, place, and time.   Psychiatric:         Attention and Perception: Attention normal.         Mood and Affect: Mood normal.         Judgment: Judgment normal.       Mariia Parker reports a pain score of 5.  Given her pain assessment as noted, treatment options were discussed and the following options were decided upon as a follow-up plan to address the patient's pain: continuation of current treatment plan for pain.     ASSESSMENT / PLAN    1. Stage 4 chronic kidney disease    2. Iron deficiency anemia, unspecified iron deficiency anemia type      ASSESSMENT:      1.  Iron Deficiency   2.  Anemia in CKD Stage IV  -Pt has multifactorial Anemia with intermittent iron deficiency and chronic kidney disease Stage     -Followed by Dr. Sánchez, Nephrology    -Labs today:  BUN 31, Creatinine 1.91, GFR 28.3, Iron 92, Ferritin 67, Sat 22%, TIBC 413, Hgb 11.0, Hct 35.4  -Can start Retacrit if Hgb < 10 or Hct < 30  -Stable for observation     3.  Obesity   Patient's (Body mass index is 48.64 kg/m².) indicates that they are morbidly obese (BMI > 40 or > 35 with obesity - related health condition) with health related conditions that include hypertension, diabetes mellitus, and GERD . Weight is improving with lifestyle modifications.  - Defer  management to PCP      PLAN:   Sent labs to PCP and Gonzalo,  Stable for observation  Continue current medications, treatment plans and follow up with PCP and any other providers  Labs only in 3 months.  Orders given to have then drawn at [facility].  Return to office in 6 months for continued follow up.  Pre-office labs 1 week before  for CBC, CMP, Iron Profile, Ferritin Orders given to patient   Care discussed with patient.  Understanding expressed.  Patient agreeable with plan.            Sadie Campbell, APRN  05/16/2024

## 2024-09-05 RX ORDER — GALCANEZUMAB 120 MG/ML
INJECTION, SOLUTION SUBCUTANEOUS
Qty: 1 ML | Refills: 5 | Status: SHIPPED | OUTPATIENT
Start: 2024-09-05

## 2024-09-05 NOTE — TELEPHONE ENCOUNTER
Requested Prescriptions     Pending Prescriptions Disp Refills    EMGALITY 120 MG/ML SOAJ [Pharmacy Med Name: EMGALITY 120 MG/ML SOAJ 120 Solution Auto-injector] 1 mL 5     Sig: INJECT 1 SYRINGE ONCE A MONTH       Last Office Visit: 10/16/2023  Next Office Visit: 10/22/2024  Last Medication Refill: 4/29/2024 with 5 RF

## 2024-09-23 ENCOUNTER — TELEPHONE (OUTPATIENT)
Dept: ONCOLOGY | Facility: CLINIC | Age: 69
End: 2024-09-23
Payer: MEDICARE

## 2024-10-22 ENCOUNTER — OFFICE VISIT (OUTPATIENT)
Dept: NEUROLOGY | Age: 69
End: 2024-10-22
Payer: MEDICARE

## 2024-10-22 VITALS
WEIGHT: 260 LBS | HEART RATE: 68 BPM | DIASTOLIC BLOOD PRESSURE: 68 MMHG | HEIGHT: 63 IN | BODY MASS INDEX: 46.07 KG/M2 | SYSTOLIC BLOOD PRESSURE: 154 MMHG

## 2024-10-22 DIAGNOSIS — I67.9 CEREBROVASCULAR SMALL VESSEL DISEASE: Primary | ICD-10-CM

## 2024-10-22 DIAGNOSIS — F01.B0 MODERATE VASCULAR DEMENTIA WITHOUT BEHAVIORAL DISTURBANCE, PSYCHOTIC DISTURBANCE, MOOD DISTURBANCE, OR ANXIETY (HCC): ICD-10-CM

## 2024-10-22 DIAGNOSIS — G47.33 SLEEP APNEA, OBSTRUCTIVE: ICD-10-CM

## 2024-10-22 DIAGNOSIS — E66.01 MORBID OBESITY: ICD-10-CM

## 2024-10-22 DIAGNOSIS — E11.42 DIABETIC POLYNEUROPATHY ASSOCIATED WITH TYPE 2 DIABETES MELLITUS (HCC): ICD-10-CM

## 2024-10-22 DIAGNOSIS — G43.711 INTRACTABLE CHRONIC MIGRAINE WITHOUT AURA AND WITH STATUS MIGRAINOSUS: ICD-10-CM

## 2024-10-22 PROCEDURE — 1159F MED LIST DOCD IN RCRD: CPT | Performed by: PSYCHIATRY & NEUROLOGY

## 2024-10-22 PROCEDURE — 99214 OFFICE O/P EST MOD 30 MIN: CPT | Performed by: PSYCHIATRY & NEUROLOGY

## 2024-10-22 PROCEDURE — 1123F ACP DISCUSS/DSCN MKR DOCD: CPT | Performed by: PSYCHIATRY & NEUROLOGY

## 2024-10-22 RX ORDER — DONEPEZIL HYDROCHLORIDE 10 MG/1
10 TABLET, FILM COATED ORAL NIGHTLY
Qty: 30 TABLET | Refills: 5 | Status: SHIPPED | OUTPATIENT
Start: 2024-10-22

## 2024-10-22 NOTE — PROGRESS NOTES
[facility]  [address], [address]  [place] [address]  Phone [phone]  Fax [phone]     [facility] Follow Up Encounter  10/22/24 1:25 PM CDT    Information:   Patient Name: Sayra Dye  :   1955  Age:   69 y.o.  MRN:   [id number]  Account #:  [id number]  Today:  10/22/24    Provider: Shilo Krishnamurthy M.D.     Chief Complaint:   Chief Complaint   Patient presents with    Headache     Pt states the Emgality does not last a month. She states it is only lasting about 3 weeks.        Subjective:   Sayra Dye is a 69 y.o. woman with chronic headaches, small vessel cerebrovascular disease, memory loss, diabetic polyneuropathy and obstructive sleep apnea who is following up.  The Emgality is not working as well as previously.  She is still having nearly daily migraine headaches that last most of the day.  She has dull headaches in between.  She has over 20 migraine days a month that last over 4 hours.  She complains of worsening forgetfulness.  She has had no stroke symptoms.  Her peripheral neuropathy is about the same.  She uses her CPAP nightly.        Objective:     Past Medical History:  Past Medical History:   Diagnosis Date    Arthritis     Diabetes mellitus (HCC)     Dizziness     Hearing loss     Hyperlipidemia     Hypertension     Pulmonary embolism (HCC)        Past Surgical History:   Procedure Laterality Date    CHOLECYSTECTOMY      HERNIA REPAIR      HYSTERECTOMY, TOTAL ABDOMINAL (CERVIX REMOVED)         Recent Hospitalizations  None    Significant Injuries  None    Habits  Sayra Dye reports that she has never smoked. She has never used smokeless tobacco. She reports that she does not drink alcohol and does not use drugs.    No family history on file.    Social History  Sayra Dye is , lives in [place], [place], and is retired.     Medications:  Current Outpatient Medications   Medication Sig Dispense Refill    donepezil (ARICEPT) 10 MG

## 2024-10-22 NOTE — PATIENT INSTRUCTIONS
INSTRUCTIONS:  Increase the donepezil to 10 mg nightly  Will change the Emgality to BOTOX.  Will need to precertify it so do not stop the Emgality.  Use the CPAP during sleep

## 2024-10-23 ENCOUNTER — TELEPHONE (OUTPATIENT)
Dept: NEUROLOGY | Age: 69
End: 2024-10-23

## 2024-10-23 NOTE — TELEPHONE ENCOUNTER
Need to precert BOTOX for chronic migraine    Post-Op Assessment Note    CV Status:  Stable    Pain management: adequate       Mental Status:  Alert and awake   Hydration Status:  Euvolemic   PONV Controlled:  Controlled   Airway Patency:  Patent     Post Op Vitals Reviewed: Yes    No anethesia notable event occurred.    Staff: Anesthesiologist           Last Filed PACU Vitals:  Vitals Value Taken Time   Temp     Pulse 68 10/08/24 1030   BP 99/58 10/08/24 1030   Resp 18 10/08/24 1030   SpO2 99 % 10/08/24 1030       Modified Des:  Activity: 2 (10/8/2024 10:42 AM)  Respiration: 2 (10/8/2024 10:42 AM)  Circulation: 2 (10/8/2024 10:42 AM)  Consciousness: 2 (10/8/2024 10:42 AM)  Oxygen Saturation: 2 (10/8/2024 10:42 AM)  Modified Des Score: 10 (10/8/2024 10:42 AM)

## 2024-11-18 ENCOUNTER — OFFICE VISIT (OUTPATIENT)
Dept: ONCOLOGY | Facility: CLINIC | Age: 69
End: 2024-11-18
Payer: MEDICARE

## 2024-11-18 VITALS
DIASTOLIC BLOOD PRESSURE: 78 MMHG | BODY MASS INDEX: 49.27 KG/M2 | WEIGHT: 278.1 LBS | HEART RATE: 60 BPM | SYSTOLIC BLOOD PRESSURE: 128 MMHG | TEMPERATURE: 97.2 F | OXYGEN SATURATION: 98 % | RESPIRATION RATE: 16 BRPM | HEIGHT: 63 IN

## 2024-11-18 DIAGNOSIS — N18.31 STAGE 3A CHRONIC KIDNEY DISEASE: Primary | ICD-10-CM

## 2024-11-18 DIAGNOSIS — D50.9 IRON DEFICIENCY ANEMIA, UNSPECIFIED IRON DEFICIENCY ANEMIA TYPE: ICD-10-CM

## 2024-11-18 PROCEDURE — 1126F AMNT PAIN NOTED NONE PRSNT: CPT | Performed by: NURSE PRACTITIONER

## 2024-11-18 PROCEDURE — 3078F DIAST BP <80 MM HG: CPT | Performed by: NURSE PRACTITIONER

## 2024-11-18 PROCEDURE — 99214 OFFICE O/P EST MOD 30 MIN: CPT | Performed by: NURSE PRACTITIONER

## 2024-11-18 PROCEDURE — 3074F SYST BP LT 130 MM HG: CPT | Performed by: NURSE PRACTITIONER

## 2024-11-18 RX ORDER — DONEPEZIL HYDROCHLORIDE 10 MG/1
10 TABLET, FILM COATED ORAL NIGHTLY
COMMUNITY

## 2024-11-18 RX ORDER — TERBINAFINE HYDROCHLORIDE 250 MG/1
250 TABLET ORAL DAILY
COMMUNITY

## 2024-11-18 RX ORDER — MECLIZINE HYDROCHLORIDE 25 MG/1
25 TABLET ORAL 3 TIMES DAILY PRN
COMMUNITY

## 2024-11-18 RX ORDER — ASPIRIN 81 MG/1
81 TABLET ORAL DAILY
COMMUNITY

## 2024-11-18 NOTE — PROGRESS NOTES
[facility] GROUP HEMATOLOGY & ONCOLOGY  [address]  [place] [address] [phone]    Patient Name: Mariia Parker  Encounter Date: 11/18/2024  YOB: 1955  Patient Number: [id number]    Hematology / Oncology Progress Note       REASON FOR VISIT: Ms. Mariia Parker is a 69 y.o.female who followed by this officefor pancytopenia from hypersplenism that is secondary to nonalcoholic fatty liver disease. She is also seen for multifactoral anemia from iron deficiency, hypersplenism and CKD stage III. She was intolerant to oral iron therapy and was switched to Nulecit in August 2019. She has not required iron replacement since.    PREVIOUS INTERVENTIONS:   2 units PRBCs 10/13/2016 at [facility].  Procrit 40,000 units subcutaneously 10/13/2016 through present at [facility]. Details not available.  Ferrous sulfate 325 mg 07/20/2017 through 01/22/2018. Resume 05/21/2018 through 09/17/2018. Resumed 11/21/2018 through 12/06/2018. Resume 05/13/2019.  Nulecit 125mg weekly x3 8/13/19, 8/20/19, 8/27/19      INTERVAL HISTORY    She presents to clinic today for continued follow up. She has no acute complaints today.   She denies any nausea, vomiting, hematuria or blood in stool.         She had labs drawn and results were reviewed with patient in the office         LABS    Lab Results - Last 18 Months   Lab Units 05/16/24  1114 11/16/23  1018   HEMOGLOBIN g/dL 11.1* 10.7*   HEMATOCRIT % 35.4 34.9   MCV fL 97.5* 96.7   WBC 10*3/mm3 7.60 6.47   RDW % 12.2* 12.7   MPV fL 10.3 9.9   PLATELETS 10*3/mm3 205 174   IMM GRAN % % 0.3 0.5   NEUTROS ABS 10*3/mm3 5.05 4.33   LYMPHS ABS 10*3/mm3 1.72 1.50   MONOS ABS 10*3/mm3 0.68 0.54   EOS ABS 10*3/mm3 0.09 0.04   BASOS ABS 10*3/mm3 0.04 0.03   IMMATURE GRANS (ABS) 10*3/mm3 0.02 0.03   NRBC /100 WBC 0.0 0.0       Lab Results - Last 18 Months   Lab Units 05/16/24  3035  "11/16/23  1018   GLUCOSE mg/dL 88 135*   SODIUM mmol/L 141 142   POTASSIUM mmol/L 3.9 3.7   CO2 mmol/L 30.0* 26.0   CHLORIDE mmol/L 103 106   ANION GAP mmol/L 8.0 10.0   CREATININE mg/dL 1.91* 1.50*   BUN mg/dL 31* 27*   BUN / CREAT RATIO  16.2 18.0   CALCIUM mg/dL 9.3 9.3   ALK PHOS U/L 97 90   TOTAL PROTEIN g/dL 7.1 6.8   ALT (SGPT) U/L 21 19   AST (SGOT) U/L 21 19   BILIRUBIN mg/dL 0.5 0.7   ALBUMIN g/dL 4.2 4.1   GLOBULIN gm/dL 2.9 2.7       No results for input(s): \"MSPIKE\", \"KAPPALAMB\", \"IGLFLC\", \"URICACID\", \"FREEKAPPAL\", \"CEA\", \"LDH\", \"REFLABREPO\" in the last 33762 hours.    Lab Results - Last 18 Months   Lab Units 05/16/24  1114 11/16/23  1018   IRON mcg/dL 92 97   TIBC mcg/dL 413 393   IRON SATURATION (TSAT) % 22 25   FERRITIN ng/mL 67.91 140.60         PAST MEDICAL HISTORY:  ALLERGIES:  No Known Allergies  CURRENT MEDICATIONS:  Outpatient Encounter Medications as of 11/18/2024   Medication Sig Dispense Refill    acetaminophen (TYLENOL) 650 MG 8 hr tablet Take 1 tablet by mouth Every 8 (Eight) Hours As Needed for Mild Pain.      amitriptyline (ELAVIL) 25 MG tablet Take 2 tablets by mouth Every Night.      amLODIPine (NORVASC) 10 MG tablet Take 1 tablet by mouth Daily.      aspirin 81 MG EC tablet Take 1 tablet by mouth Daily.      atorvastatin (LIPITOR) 40 MG tablet Take 1 tablet by mouth Daily.      carvedilol (COREG) 25 MG tablet Take 1 tablet by mouth 2 (Two) Times a Day With Meals.      colestipol (COLESTID) 1 g tablet Take 1 tablet by mouth 2 (Two) Times a Day.      donepezil (ARICEPT) 10 MG tablet Take 1 tablet by mouth Every Night.      FLUoxetine (PROzac) 20 MG capsule Take 2 capsules by mouth Daily.      furosemide (LASIX) 20 MG tablet Take 1 tablet by mouth Daily. 1 or 2 tablets daily      Galcanezumab-gnlm (EMGALITY SC) Inject  under the skin into the appropriate area as directed.      linagliptin (TRADJENTA) 5 MG tablet tablet Take 1 tablet by mouth Daily.      lisinopril (PRINIVIL,ZESTRIL) 20 " MG tablet Take 1 tablet by mouth Daily.      meclizine (ANTIVERT) 25 MG tablet Take 1 tablet by mouth 3 (Three) Times a Day As Needed for Dizziness.      Multiple Vitamins-Minerals (MULTIVITAMIN ADULT PO) Take  by mouth.      pantoprazole (PROTONIX) 40 MG EC tablet Take 1 tablet by mouth Daily.      pregabalin (LYRICA) 25 MG capsule       terbinafine (lamiSIL) 250 MG tablet Take 1 tablet by mouth Daily.      VITAMIN D, ERGOCALCIFEROL, PO Take 2,000 mg by mouth Daily.      Wheat Dextrin (BENEFIBER PO) Take  by mouth As Needed.      XARELTO 20 MG tablet 1 tablet Daily With Dinner.       No facility-administered encounter medications on file as of 11/18/2024.     ADULT ILLNESSES:  Patient Active Problem List   Diagnosis Code    Body mass index (BMI) of 50-59.9 in adult Z68.43    Stage 3 chronic kidney disease N18.30    Pancytopenia D61.818    Hypersplenism D73.1    Hypertension I10    Non-alcoholic fatty liver disease K76.0    Anemia in stage 3 chronic kidney disease N18.30, D63.1     SURGERIES:  Past Surgical History:   Procedure Laterality Date    CARPAL TUNNEL RELEASE Right     ENDOSCOPY AND COLONOSCOPY  03/2016    FOREIGN BODY REMOVAL Right     leg    HYSTERECTOMY      open; total    LAPAROSCOPIC CHOLECYSTECTOMY      SMALL INTESTINE SURGERY  06/2016    UMBILICAL HERNIA REPAIR      not sure if has mesh in there or not (x4 surgeries)      HEALTH MAINTENANCE ITEMS:  Health Maintenance Due   Topic Date Due    LIPID PANEL  Never done    DXA SCAN  Never done    COLORECTAL CANCER SCREENING  Never done    Pneumococcal Vaccine 65+ (1 of 2 - PCV) Never done    DIABETIC FOOT EXAM  Never done    DIABETIC EYE EXAM  Never done    ZOSTER VACCINE (1 of 2) Never done    TDAP/TD VACCINES (2 - Tdap) 11/07/2007    HEPATITIS C SCREENING  Never done    ANNUAL WELLNESS VISIT  Never done    PAP SMEAR  Never done    MAMMOGRAM  10/26/2018    HEMOGLOBIN A1C  12/12/2019    BMI FOLLOWUP  09/23/2022    INFLUENZA VACCINE  Never done    COVID-19  "Vaccine (1 - 2024-25 season) Never done       <no information>  Last Completed Colonoscopy       This patient has no relevant Health Maintenance data.            There is no immunization history on file for this patient.  Last Completed Mammogram       This patient has no relevant Health Maintenance data.              FAMILY HISTORY:  Family History   Problem Relation Age of Onset    Cancer Mother     Hypertension Mother     Heart disease Mother     Breast cancer Mother     Dementia Mother     Parkinsonism Mother     No Known Problems Father     Hypertension Brother     Other Maternal Grandmother         brain tumor    No Known Problems Maternal Grandfather     No Known Problems Paternal Grandmother     No Known Problems Paternal Grandfather     No Known Problems Brother      SOCIAL HISTORY:  Social History     Socioeconomic History    Marital status:    Tobacco Use    Smoking status: Never    Smokeless tobacco: Never   Substance and Sexual Activity    Alcohol use: No    Drug use: No    Sexual activity: Defer     Birth control/protection: Surgical       REVIEW OF SYSTEMS:  Review of Systems   Constitutional:  Negative for fatigue and fever.   HENT:  Negative for trouble swallowing.    Respiratory:  Negative for cough and shortness of breath.    Cardiovascular:  Negative for chest pain, palpitations and leg swelling.   Gastrointestinal:  Negative for nausea and vomiting.   Genitourinary:  Negative for hematuria.   Musculoskeletal:  Negative for arthralgias and myalgias.   Skin:  Negative for rash, skin lesions and wound.   Neurological:  Negative for dizziness, syncope, memory problem and confusion.   Psychiatric/Behavioral:  Negative for suicidal ideas and depressed mood. The patient is not nervous/anxious.        /78   Pulse 60   Temp 97.2 °F (36.2 °C)   Resp 16   Ht 160 cm (63\")   Wt 126 kg (278 lb 1.6 oz)   SpO2 98%   BMI 49.26 kg/m²  Body surface area is 2.22 meters squared.    Pain Score    " 11/18/24 1014   PainSc: 0-No pain         Physical Exam  Constitutional:       Appearance: Normal appearance. She is obese.      Comments:     HENT:      Head: Normocephalic and atraumatic.   Cardiovascular:      Rate and Rhythm: Normal rate and regular rhythm.      Heart sounds: Murmur heard.   Pulmonary:      Effort: Pulmonary effort is normal.      Breath sounds: Normal breath sounds.   Abdominal:      General: Bowel sounds are normal.      Palpations: Abdomen is soft.      Hernia: A hernia is present.   Musculoskeletal:      Right lower leg: No edema.      Left lower leg: No edema.   Skin:     General: Skin is warm and dry.   Neurological:      Mental Status: She is alert and oriented to person, place, and time.   Psychiatric:         Attention and Perception: Attention normal.         Mood and Affect: Mood normal.         Judgment: Judgment normal.       Mariia Parker reports a pain score of 0.  Given her pain assessment as noted, treatment options were discussed and the following options were decided upon as a follow-up plan to address the patient's pain: continuation of current treatment plan for pain.     ASSESSMENT / PLAN    1. Stage 3a chronic kidney disease    2. Iron deficiency anemia, unspecified iron deficiency anemia type        ASSESSMENT:      1.  Iron Deficiency   2.  Anemia in CKD Stage IIIa  -Pt has multifactorial Anemia with intermittent iron deficiency and chronic kidney disease Stage     -Followed by Dr. Sánchez, Nephrology    -Labs today:  BUN 31, Creatinine 1.91, GFR 28.3, Iron 92, Ferritin 67, TIBC 413, Hgb 11.1, Hct 35.4  -Can start Retacrit if Hgb < 10 or Hct < 30  -Stable for observation       Obesity   Patient's (Body mass index is 49.26 kg/m².) indicates that they are morbidly obese (BMI > 40 or > 35 with obesity - related health condition) with health related conditions that include hypertension, diabetes mellitus, and GERD . Weight is improving with lifestyle modifications.  -  Defer management to PCP      She has been referred to Urology for kidney stones.  Requiring cardiac clearance.      PLAN:      Stable for observation  Continue current medications, treatment plans and follow up with PCP and any other providers  Labs only in 3 months.  Orders given to have then drawn at [facility].  Return to office in 6 months for continued follow up.  Pre-office labs 1 week before  for CBC, CMP, Iron Profile, Ferritin Orders given to patient   Care discussed with patient.  Understanding expressed.  Patient agreeable with plan.            Sadie Campbell, APRN  11/18/2024

## 2024-11-21 ENCOUNTER — HOSPITAL ENCOUNTER (OUTPATIENT)
Dept: PAIN MANAGEMENT | Age: 69
Discharge: HOME | End: 2024-11-21
Payer: MEDICARE

## 2024-11-21 VITALS
HEART RATE: 70 BPM | TEMPERATURE: 96.9 F | OXYGEN SATURATION: 97 % | RESPIRATION RATE: 16 BRPM | DIASTOLIC BLOOD PRESSURE: 62 MMHG | SYSTOLIC BLOOD PRESSURE: 144 MMHG

## 2024-11-21 DIAGNOSIS — G43.719 INTRACTABLE CHRONIC MIGRAINE WITHOUT AURA AND WITHOUT STATUS MIGRAINOSUS: Primary | ICD-10-CM

## 2024-11-21 PROCEDURE — 6360000002 HC RX W HCPCS

## 2024-11-21 PROCEDURE — 2580000003 HC RX 258

## 2024-11-21 PROCEDURE — 64615 CHEMODENERV MUSC MIGRAINE: CPT

## 2024-11-21 RX ORDER — SODIUM CHLORIDE 9 MG/ML
4.5 INJECTION, SOLUTION INTRAMUSCULAR; INTRAVENOUS; SUBCUTANEOUS ONCE
Status: DISCONTINUED | OUTPATIENT
Start: 2024-11-21 | End: 2024-11-23 | Stop reason: HOSPADM

## 2024-11-21 NOTE — PROCEDURES
depression  Neurological: negative for - memory loss, numbness/tingling, and weakness     PHYSICAL EXAMINATION:  Vitals:  BP (!) 156/56   Pulse 70   Temp 96.9 °F (36.1 °C) (Temporal)   Resp 16   SpO2 97%   General appearance:  Alert, well developed, well nourished, in no distress  HEENT:  normocephalic, atraumatic, sclera appear normal, no nasal abnormalities, no rhinorrhea, Ears appear normal, oral mucous membranes are moist without erythema, trachea midline, thyroid is normal, no lymphadenopathy or neck mass.  Cardiovascular:  Regular rate and rhythm without murmer.  No peripheral edema, No cyanosis or clubbing.  No carotid bruits.  Pulmonary:  Lungs are clear to auscultation.  Breathing appears normal, good expansion, normal effort without use of accessory muscles  Musculoskeletal:  Joints are normal.  No splints, slings, or casts.  Spine appears normal with normal posture and range of motion.  Integument:  No rash, erythema, or pallor  Psychiatric:  Mood, affect, and behavior appear normal      NEUROLOGIC EXAMINATION:  Mental Status:  {pe mental status_general use:[id number]::\"alert, oriented to person, place, and time\"}.  Speech:  Clear without dysarthria or dysphonia  Language:  Fluent without aphasia  Cranial Nerves:   II Visual fields are full to confrontation   III,IV, VI Extraocular movements are full   V Facial sensation is intact   VII Facial movements are symmetrical without weakness   VIII Hearing is intact   IX,X Shoulder shrug and head rotation strength are intact   XII No tongue atrophy or fasciculations.  Normal tongue protrusion.  No tongue weakness  Motor:  Normal strength in both upper and lower extremities.  Normal muscle tone and bulk.  Deep tendon reflexes are intact and symmetrical in both upper and lower extremities.  Jett's signs are absent bilaterally.  There is no ankle clonus on either side.  Toes are downgoing to plantar stimulation bilaterally.  Sensation:  Sensation is intact to

## 2024-11-21 NOTE — PROGRESS NOTES
[facility]   [address]  [place] [address]  [phone] p  [phone]    Procedure:  Level of Consciousness: [x]Alert [x]Oriented []Disoriented []Lethargic  Anxiety Level: [x]Calm []Anxious []Depressed []Other  Skin: [x]Warm [x]Dry []Cool []Moist []Intact []Other  Cardiovascular: []Palpitations: [x]Never []Occasionally []Frequently  Chest Pain: [x]No []Yes  Respiratory:  [x]Unlabored []Labored []Cough ([] Productive []Unproductive)  HCG Required: [x]No []Yes   Results: []Negative []Positive  Knowledge Level:        [x]Patient/Other verbalized understanding of pre-procedure instructions.        [x]Assessment of post-op care needs (transportation, responsible caregiver)        [x]Able to discuss health care problems and how to deal with it.  Factors that Affect Teaching:        Language Barrier: [x]No []Yes - why:        Hearing Loss:        []No [x]Yes            Corrective Device:  [x]Yes []No        Vision Loss:           []No [x]Yes            Corrective Device:  [x]Yes []No        Memory Loss:       [x]No []Yes            []Short Term []Long Term  Motivational Level:  [x]Asks Questions                  []Extremely Anxious       [x]Seems Interested               []Seems Uninterested                  []Denies need for Education  Risk for Injury:  [x]Patient oriented to person, place and time  []History of frequent falls/loss of balance  Nutritional:  []Change in appetite   []Weight Gain   []Weight Loss  Functional:  []Requires assistance with ADL's      Migraine  Follow up Assessment:  Patient experiences 35 headaches per month  Patient states that he/she has  20 headaches out of 30 days each month.  Patient states that he/she has 15 migraines out of 30 days each month.  Patient has experienced a  reduction in Migraine headaches less than 15 days per month []Yes [x]No  Patient has experienced a reduction in Migraine hours []Yes [x]No

## 2024-11-21 NOTE — DISCHARGE INSTRUCTIONS
[facility] Physical And Pain Medicine  Post Procedure Discharge Instructions        YOU HAVE HAD THE FOLLOWING PROCEDURE:                                  [] Occipital Nerve Blocks  [] CTS wrist injection(s)  [] Knee Injection(s)         [] Shoulder Injection(s)   [] Elbow Injection(s)     [x] Botox Injection  [] Cervical Trigger Point Injections    [] Thoracic Trigger Point Injections    [] Lumbar Trigger Point Injections  [] Piriformis Trigger Point Injections  [] SI Joint Injection(s)     [] Trochanteric Bursa Injection(s)       [] Ankle Injection(s)   [] Plantar Fasciitis   []  ______________  Injection(s) [x] Botox [x]  Migraines [] Spasticity    YOU HAVE RECEIVED THE FOLLOWING MEDICATIONS IN YOUR INJECTION(s)  [] Lidocaine [] Bupivacaine   [] DepoMedrol (steroid) [] Decadron (steroid)  []  Kenalog (steroid)   [] Toradol  [] Supartz [] Zilretta    [x] Botox        PATIENT INFORMATION:   You may experience the following symptoms after your procedure. These symptoms are normal and should not cause concern:    You may have an increase in your pain. This may last 24 - 48 hours after your procedure.  You may have no change in the pain that you had prior to your injection(s).  You may have weakness or numbness in your affected extremity. If this occurs, this may last until numbing the medication wears off.     REPORT THE FOLLOWING SYMPTOMS TO YOUR DOCTOR:  Redness, swelling or drainage at the injection site(s)  Unusual pain that interferes with your normal activities of daily living.    OTHER INSTRUCTIONS:    [] I will apply ice to the injection site(s) for at least 24 hours after the procedure. I will rotate the ice on for 20 minutes and off for 20 minutes for at least 24 hours.    [] I will not apply heat for at least 48 hours and I will not take a hot bath or shower for at least 24 hours.     [] I understand that if Lidocaine or Bupivacaine was used in my injection(s) that the injection site(s)

## 2024-11-27 PROBLEM — E66.01 MORBID OBESITY: Status: ACTIVE | Noted: 2024-11-27

## 2024-11-27 PROBLEM — F01.B0 MODERATE VASCULAR DEMENTIA WITHOUT BEHAVIORAL DISTURBANCE, PSYCHOTIC DISTURBANCE, MOOD DISTURBANCE, OR ANXIETY (HCC): Status: ACTIVE | Noted: 2024-11-27

## 2024-11-27 PROBLEM — I67.9 SMALL VESSEL DISEASE, CEREBROVASCULAR: Status: ACTIVE | Noted: 2024-11-27

## 2024-11-27 PROBLEM — E11.42 DIABETIC POLYNEUROPATHY ASSOCIATED WITH TYPE 2 DIABETES MELLITUS (HCC): Status: ACTIVE | Noted: 2024-11-27

## 2024-11-27 PROBLEM — G47.33 OBSTRUCTIVE SLEEP APNEA: Status: ACTIVE | Noted: 2024-11-27

## 2024-12-05 ENCOUNTER — TELEPHONE (OUTPATIENT)
Dept: NEUROLOGY | Age: 69
End: 2024-12-05

## 2024-12-05 NOTE — TELEPHONE ENCOUNTER
I have called and left patient a VM to let her know that I have her scheduled for her 6wk f/u after having Botox with Dr. Coronado. Left on VM the appointment time/date.

## 2025-01-16 ENCOUNTER — OFFICE VISIT (OUTPATIENT)
Dept: NEUROLOGY | Age: 70
End: 2025-01-16
Payer: MEDICARE

## 2025-01-16 VITALS
HEIGHT: 63 IN | WEIGHT: 280 LBS | SYSTOLIC BLOOD PRESSURE: 140 MMHG | HEART RATE: 71 BPM | BODY MASS INDEX: 49.61 KG/M2 | RESPIRATION RATE: 18 BRPM | DIASTOLIC BLOOD PRESSURE: 69 MMHG

## 2025-01-16 DIAGNOSIS — F01.B0 MODERATE VASCULAR DEMENTIA WITHOUT BEHAVIORAL DISTURBANCE, PSYCHOTIC DISTURBANCE, MOOD DISTURBANCE, OR ANXIETY (HCC): ICD-10-CM

## 2025-01-16 DIAGNOSIS — G43.711 INTRACTABLE CHRONIC MIGRAINE WITHOUT AURA AND WITH STATUS MIGRAINOSUS: ICD-10-CM

## 2025-01-16 DIAGNOSIS — G47.33 SLEEP APNEA, OBSTRUCTIVE: ICD-10-CM

## 2025-01-16 DIAGNOSIS — E11.42 DIABETIC POLYNEUROPATHY ASSOCIATED WITH TYPE 2 DIABETES MELLITUS (HCC): ICD-10-CM

## 2025-01-16 DIAGNOSIS — I67.9 CEREBROVASCULAR SMALL VESSEL DISEASE: Primary | ICD-10-CM

## 2025-01-16 PROCEDURE — 1159F MED LIST DOCD IN RCRD: CPT | Performed by: PSYCHIATRY & NEUROLOGY

## 2025-01-16 PROCEDURE — 99213 OFFICE O/P EST LOW 20 MIN: CPT | Performed by: PSYCHIATRY & NEUROLOGY

## 2025-01-16 PROCEDURE — 1123F ACP DISCUSS/DSCN MKR DOCD: CPT | Performed by: PSYCHIATRY & NEUROLOGY

## 2025-01-16 RX ORDER — SPIRONOLACTONE 25 MG/1
TABLET ORAL
COMMUNITY
Start: 2024-12-31

## 2025-01-16 RX ORDER — MUPIROCIN 20 MG/G
OINTMENT TOPICAL
COMMUNITY
Start: 2025-01-07

## 2025-01-16 RX ORDER — MECLIZINE HYDROCHLORIDE 25 MG/1
25 TABLET ORAL 3 TIMES DAILY PRN
COMMUNITY

## 2025-01-16 RX ORDER — ASPIRIN 81 MG/1
81 TABLET ORAL DAILY
COMMUNITY

## 2025-01-16 RX ORDER — CALCITRIOL 0.25 UG/1
CAPSULE, LIQUID FILLED ORAL
COMMUNITY
Start: 2024-12-18

## 2025-01-16 RX ORDER — OFLOXACIN 3 MG/ML
SOLUTION AURICULAR (OTIC)
COMMUNITY

## 2025-01-16 NOTE — PROGRESS NOTES
muscles  Musculoskeletal:  Joints are osteoarthritic   Integument:  No rash, erythema, or pallor  Psychiatric:  Mood, affect, and behavior appear normal      NEUROLOGIC EXAMINATION:  Mental Status:  alert, oriented to person, place, and time.  Speech:  Clear without dysarthria or dysphonia  Language:  Fluent without aphasia  Cranial Nerves:   II Visual fields are full to confrontation   III,IV, VI Extraocular movements are full   VII Facial movements are symmetrical without weakness   VIII Hearing is intact   IX,X Shoulder shrug and head rotation strength are intact   XII No tongue atrophy or fasciculations.  Normal tongue protrusion.  No tongue weakness  Motor:  Normal strength in both upper and lower extremities.  Normal muscle tone and bulk.  Deep tendon reflexes are intact and symmetrical in both upper and lower extremities.  Jett's signs are absent bilaterally.  There is no ankle clonus on either side.    Sensation:  Sensation is intact to light touch, temperature, and vibration in all extremities.  Coordination:  Rapid alternating movements are normal in both upper and lower extremities.  Finger to nose testing is unimpaired bilaterally.  Gait:  Normal station and gait.       Assessment:       ICD-10-CM    1. Cerebrovascular small vessel disease  I67.9       2. Intractable chronic migraine without aura and with status migrainosus  G43.711       3. Sleep apnea, obstructive  G47.33       4. Diabetic polyneuropathy associated with type 2 diabetes mellitus (HCC)  E11.42       5. Moderate vascular dementia without behavioral disturbance, psychotic disturbance, mood disturbance, or anxiety ([facility])  F01.B0       I discussed the above with her.      Plan:   1.         Continue CPAP use during sleep  2. Continue present medications - Aricept, amitriptyline, Lyrica  3. BOTOX treatment as planned 2/20/2025    Electronically signed by Shilo Krishnamurthy MD on 1/16/25

## 2025-02-20 ENCOUNTER — HOSPITAL ENCOUNTER (OUTPATIENT)
Dept: PAIN MANAGEMENT | Age: 70
Discharge: HOME | End: 2025-02-20
Payer: MEDICARE

## 2025-02-20 VITALS — DIASTOLIC BLOOD PRESSURE: 52 MMHG | RESPIRATION RATE: 16 BRPM | TEMPERATURE: 96.5 F | SYSTOLIC BLOOD PRESSURE: 157 MMHG

## 2025-02-20 DIAGNOSIS — I67.9 SMALL VESSEL DISEASE, CEREBROVASCULAR: ICD-10-CM

## 2025-02-20 DIAGNOSIS — E11.42 DIABETIC POLYNEUROPATHY ASSOCIATED WITH TYPE 2 DIABETES MELLITUS (HCC): ICD-10-CM

## 2025-02-20 DIAGNOSIS — F01.B0 MODERATE VASCULAR DEMENTIA WITHOUT BEHAVIORAL DISTURBANCE, PSYCHOTIC DISTURBANCE, MOOD DISTURBANCE, OR ANXIETY (HCC): ICD-10-CM

## 2025-02-20 DIAGNOSIS — G43.719 INTRACTABLE CHRONIC MIGRAINE WITHOUT AURA AND WITHOUT STATUS MIGRAINOSUS: Primary | ICD-10-CM

## 2025-02-20 PROCEDURE — 2580000003 HC RX 258

## 2025-02-20 PROCEDURE — 64615 CHEMODENERV MUSC MIGRAINE: CPT

## 2025-02-20 PROCEDURE — 6360000002 HC RX W HCPCS

## 2025-02-20 RX ORDER — SODIUM CHLORIDE 9 MG/ML
4.5 INJECTION, SOLUTION INTRAMUSCULAR; INTRAVENOUS; SUBCUTANEOUS ONCE
Status: DISCONTINUED | OUTPATIENT
Start: 2025-02-20 | End: 2025-02-22 | Stop reason: HOSPADM

## 2025-02-20 NOTE — DISCHARGE INSTRUCTIONS
[facility] Physical And Pain Medicine  Post Procedure Discharge Instructions        YOU HAVE HAD THE FOLLOWING PROCEDURE:                                  [] Occipital Nerve Blocks  [] CTS wrist injection(s)  [] Knee Injection(s)         [] Shoulder Injection(s)   [] Elbow Injection(s)     [x] Botox Injection  [] Cervical Trigger Point Injections    [] Thoracic Trigger Point Injections    [] Lumbar Trigger Point Injections  [] Piriformis Trigger Point Injections  [] SI Joint Injection(s)     [] Trochanteric Bursa Injection(s)       [] Ankle Injection(s)   [] Plantar Fasciitis   []  ______________  Injection(s) [x] Botox [x]  Migraines [] Spasticity    YOU HAVE RECEIVED THE FOLLOWING MEDICATIONS IN YOUR INJECTION(s)  [] Lidocaine [] Bupivacaine   [] DepoMedrol (steroid) [] Decadron (steroid)  []  Kenalog (steroid)   [] Toradol  [] Supartz [] Zilretta    [x] Botox        PATIENT INFORMATION:   You may experience the following symptoms after your procedure. These symptoms are normal and should not cause concern:    You may have an increase in your pain. This may last 24 - 48 hours after your procedure.  You may have no change in the pain that you had prior to your injection(s).  You may have weakness or numbness in your affected extremity. If this occurs, this may last until numbing the medication wears off.     REPORT THE FOLLOWING SYMPTOMS TO YOUR DOCTOR:  Redness, swelling or drainage at the injection site(s)  Unusual pain that interferes with your normal activities of daily living.    OTHER INSTRUCTIONS:    [] I will apply ice to the injection site(s) for at least 24 hours after the procedure. I will rotate the ice on for 20 minutes and off for 20 minutes for at least 24 hours.    [] I will not apply heat for at least 48 hours and I will not take a hot bath or shower for at least 24 hours.     [] I understand that if Lidocaine or Bupivacaine was used in my injection(s) that the injection site(s)

## 2025-02-20 NOTE — PROGRESS NOTES
[facility]   [address]  [place] [address]  [phone] p  [phone]    Procedure:  Level of Consciousness: [x]Alert [x]Oriented []Disoriented []Lethargic  Anxiety Level: [x]Calm []Anxious []Depressed []Other  Skin: []Warm [x]Dry []Cool []Moist []Intact []Other  Cardiovascular: []Palpitations: [x]Never []Occasionally []Frequently  Chest Pain: [x]No []Yes  Respiratory:  [x]Unlabored []Labored []Cough ([] Productive []Unproductive)  HCG Required: [x]No []Yes   Results: []Negative []Positive  Knowledge Level:        [x]Patient/Other verbalized understanding of pre-procedure instructions.        [x]Assessment of post-op care needs (transportation, responsible caregiver)        [x]Able to discuss health care problems and how to deal with it.  Factors that Affect Teaching:        Language Barrier: [x]No []Yes - why:        Hearing Loss:        []No [x]Yes            Corrective Device:  [x]Yes []No        Vision Loss:           []No [x]Yes            Corrective Device:  [x]Yes []No        Memory Loss:       [x]No []Yes            []Short Term []Long Term  Motivational Level:  []Asks Questions                  []Extremely Anxious       [x]Seems Interested               []Seems Uninterested                  []Denies need for Education  Risk for Injury:  [x]Patient oriented to person, place and time  []History of frequent falls/loss of balance  Nutritional:  []Change in appetite   []Weight Gain   []Weight Loss  Functional:  []Requires assistance with ADL's      Migraine  Follow up Assessment:  Patient experiences 4 headaches per month  Patient states that he/she has  2 headaches out of 30 days each month.  Patient states that he/she has  2 migraines out of 30 days each month.  Patient has experienced a  reduction in Migraine headaches less than 15 days per month [x]Yes []No  Patient has experienced a reduction in Migraine hours [x]Yes []No                [facility]   [address]

## 2025-02-20 NOTE — PROCEDURES
onabotulinumtoxinA (BOTOX) injection 155 Units  155 Units IntraMUSCular Once Shilo Krishnamurthy MD        sodium chloride (PF) 0.9 % injection 4.5 mL  4.5 mL Other Once Shilo Krishnamurthy MD           Allergies:  Allergies as of 02/20/2025 - Fully Reviewed 01/16/2025   Allergen Reaction Noted    Latex Itching 05/25/2022       Review of Systems:  Constitutional: negative for - chills and fever  Eyes:  negative for - visual disturbance and photophobia  HENMT: positive for - headaches and sinus pain  Respiratory: negative for - cough, hemoptysis, and shortness of breath  Cardiovascular: negative for - chest pain and palpitations  Gastrointestinal: negative for - blood in stools, constipation, diarrhea, nausea, and vomiting  Genito-Urinary: negative for - hematuria, urinary frequency, urinary urgency, and urinary retention  Musculoskeletal: positive for - joint pain, joint stiffness, and joint swelling  Hematological and Lymphatic: negative for - bleeding problems, abnormal bruising, and swollen lymph nodes  Endocrine:  negative for - polydipsia and polyphagia  Allergy/Immunology:  negative for - rhinorrhea, sinus congestion, hives  Integument:  negative for - negative for - rash, change in moles, new or changing lesions  Psychological: negative for - anxiety and depression  Neurological: positive for - memory loss, numbness/tingling, and weakness     PHYSICAL EXAMINATION:  Vitals:  BP (!) 157/52   Temp (!) 96.5 °F (35.8 °C) (Temporal)   Resp 16   General appearance:  Alert, well developed, well nourished, in no distress  HEENT:  normocephalic, atraumatic, sclera appear normal, no nasal abnormalities, no rhinorrhea, Ears appear normal, oral mucous membranes are moist without erythema, trachea midline, thyroid is normal, no lymphadenopathy or neck mass.  Cardiovascular:  Regular rate and rhythm without murmer.  No peripheral edema, No cyanosis or clubbing.  No carotid bruits.  Pulmonary:  Lungs are clear to

## 2025-04-21 ENCOUNTER — OFFICE VISIT (OUTPATIENT)
Dept: NEUROLOGY | Age: 70
End: 2025-04-21
Payer: MEDICARE

## 2025-04-21 VITALS
RESPIRATION RATE: 16 BRPM | HEART RATE: 72 BPM | BODY MASS INDEX: 49.61 KG/M2 | DIASTOLIC BLOOD PRESSURE: 63 MMHG | HEIGHT: 63 IN | SYSTOLIC BLOOD PRESSURE: 106 MMHG | WEIGHT: 280 LBS

## 2025-04-21 DIAGNOSIS — G43.711 INTRACTABLE CHRONIC MIGRAINE WITHOUT AURA AND WITH STATUS MIGRAINOSUS: ICD-10-CM

## 2025-04-21 DIAGNOSIS — E11.42 DIABETIC POLYNEUROPATHY ASSOCIATED WITH TYPE 2 DIABETES MELLITUS (HCC): ICD-10-CM

## 2025-04-21 DIAGNOSIS — G47.33 SLEEP APNEA, OBSTRUCTIVE: ICD-10-CM

## 2025-04-21 DIAGNOSIS — F01.B0 MODERATE VASCULAR DEMENTIA WITHOUT BEHAVIORAL DISTURBANCE, PSYCHOTIC DISTURBANCE, MOOD DISTURBANCE, OR ANXIETY (HCC): ICD-10-CM

## 2025-04-21 DIAGNOSIS — I67.9 CEREBROVASCULAR SMALL VESSEL DISEASE: Primary | ICD-10-CM

## 2025-04-21 PROCEDURE — 1123F ACP DISCUSS/DSCN MKR DOCD: CPT | Performed by: PSYCHIATRY & NEUROLOGY

## 2025-04-21 PROCEDURE — 99213 OFFICE O/P EST LOW 20 MIN: CPT | Performed by: PSYCHIATRY & NEUROLOGY

## 2025-04-21 PROCEDURE — 1159F MED LIST DOCD IN RCRD: CPT | Performed by: PSYCHIATRY & NEUROLOGY

## 2025-04-21 NOTE — PROGRESS NOTES
[facility] Neurology  [address], [address]  [place]  [address]  Phone [phone]  Fax [phone]     [facility] Neurology Follow Up Encounter  25 9:23 AM CDT    Information:   Patient Name: Sayra Dye  :   1955  Age:   69 y.o.  MRN:   [id number]  Account #:  [id number]  Today:  25    Provider: Shilo Krishnamurthy M.D.     Chief Complaint:   Chief Complaint   Patient presents with    Follow-up     Patient is here for follow up of Cerebrovascular,patient states she is having no issues       Subjective:   Sayra Dye is a 69 y.o. woman with  chronic headaches, small vessel cerebrovascular disease, memory loss, diabetic polyneuropathy and obstructive sleep apnea who is following up.  She started Botox treatment for her headaches 5 months ago.  Her migraines are doing better.  Her insurance is not covering the Emgality.  Her memory is poor.  She is very [place].  She has had no stroke symptoms.  Her neuropathy is stable.   She has RODRICK and does use CPAP during sleep.  Her machine is 2 years old.        Objective:     Past Medical History:  Past Medical History:   Diagnosis Date    Arthritis     Diabetes mellitus (HCC)     Dizziness     Hearing loss     Hyperlipidemia     Hypertension     Pulmonary embolism        Past Surgical History:   Procedure Laterality Date    CHOLECYSTECTOMY      HERNIA REPAIR      HYSTERECTOMY, TOTAL ABDOMINAL (CERVIX REMOVED)      LITHOTRIPSY  2025       Recent Hospitalizations  None    Significant Injuries  None    Habits  Sayra Dye reports that she has never smoked. She has never used smokeless tobacco. She reports that she does not drink alcohol and does not use drugs.    No family history on file.    Social History  Sayra Dye is , lives in [place], [place], and is retired.     Medications:  Current Outpatient Medications   Medication Sig Dispense Refill    aspirin 81 MG EC tablet Take 1 tablet by mouth daily      calcitRIOL

## 2025-05-13 ENCOUNTER — TELEPHONE (OUTPATIENT)
Age: 70
End: 2025-05-13
Payer: MEDICARE

## 2025-05-13 RX ORDER — PANTOPRAZOLE SODIUM 40 MG/1
40 TABLET, DELAYED RELEASE ORAL DAILY
Qty: 30 TABLET | Refills: 5 | Status: SHIPPED | OUTPATIENT
Start: 2025-05-13

## 2025-05-21 RX ORDER — RIVAROXABAN 20 MG/1
20 TABLET, FILM COATED ORAL
Qty: 30 TABLET | Refills: 1 | Status: SHIPPED | OUTPATIENT
Start: 2025-05-21

## 2025-05-21 NOTE — TELEPHONE ENCOUNTER
Rx Refill Note  Requested Prescriptions     Pending Prescriptions Disp Refills    Xarelto 20 MG tablet 30 tablet 6     Sig: Take 1 tablet by mouth Daily With Dinner.      Last office visit with prescribing clinician: Visit date not found   Last telemedicine visit with prescribing clinician: Visit date not found   Next office visit with prescribing clinician: Visit date not found                         Would you like a call back once the refill request has been completed: [] Yes [] No    If the office needs to give you a call back, can they leave a voicemail: [] Yes [] No    Diane Bal CMA  05/21/25, 10:30 CDT

## 2025-05-29 ENCOUNTER — OFFICE VISIT (OUTPATIENT)
Dept: ONCOLOGY | Facility: CLINIC | Age: 70
End: 2025-05-29
Payer: MEDICARE

## 2025-05-29 VITALS
WEIGHT: 276 LBS | OXYGEN SATURATION: 98 % | HEART RATE: 68 BPM | RESPIRATION RATE: 16 BRPM | TEMPERATURE: 97.4 F | SYSTOLIC BLOOD PRESSURE: 142 MMHG | DIASTOLIC BLOOD PRESSURE: 82 MMHG | BODY MASS INDEX: 48.9 KG/M2 | HEIGHT: 63 IN

## 2025-05-29 DIAGNOSIS — D50.9 IRON DEFICIENCY ANEMIA, UNSPECIFIED IRON DEFICIENCY ANEMIA TYPE: ICD-10-CM

## 2025-05-29 DIAGNOSIS — N18.31 STAGE 3A CHRONIC KIDNEY DISEASE: Primary | ICD-10-CM

## 2025-05-29 NOTE — PROGRESS NOTES
[facility]  [address]  [facility] [place] [address] [phone]    Patient Name: Mariia Parker  Encounter Date: 05/29/2025  YOB: 1955  Patient Number: [id number]    Hematology / Oncology Progress Note       REASON FOR VISIT: Ms. Mariia Parker is a 70 y.o.female who followed by this officefor pancytopenia from hypersplenism that is secondary to nonalcoholic fatty liver disease. She is also seen for multifactoral anemia from iron deficiency, hypersplenism and CKD stage III. She was intolerant to oral iron therapy and was switched to Nulecit in August 2019. She has not required iron replacement since.    PREVIOUS INTERVENTIONS:   2 units PRBCs 10/13/2016 at [facility].  Procrit 40,000 units subcutaneously 10/13/2016 through present at [facility]. Details not available.  Ferrous sulfate 325 mg 07/20/2017 through 01/22/2018. Resume 05/21/2018 through 09/17/2018. Resumed 11/21/2018 through 12/06/2018. Resume 05/13/2019.  Nulecit 125mg weekly x3 8/13/19, 8/20/19, 8/27/19      INTERVAL HISTORY    She presents to clinic today for continued follow up. She has no acute complaints today.        She had labs drawn 5/16/25 and results were reviewed with patient in the office   BUN 35, Creat 1.4, GFR 41 Iron 91, Ferritin 36.1, Sat 22, TIBC 413, Hgb 11.4, Hct 35.4.      LABS    Lab Results - Last 18 Months   Lab Units 05/16/24  1114   HEMOGLOBIN g/dL 11.1*   HEMATOCRIT % 35.4   MCV fL 97.5*   WBC 10*3/mm3 7.60   RDW % 12.2*   MPV fL 10.3   PLATELETS 10*3/mm3 205   IMM GRAN % % 0.3   NEUTROS ABS 10*3/mm3 5.05   LYMPHS ABS 10*3/mm3 1.72   MONOS ABS 10*3/mm3 0.68   EOS ABS 10*3/mm3 0.09   BASOS ABS 10*3/mm3 0.04   IMMATURE GRANS (ABS) 10*3/mm3 0.02   NRBC /100 WBC 0.0       Lab Results - Last 18 Months   Lab Units 05/16/24  1114   GLUCOSE mg/dL 88   SODIUM mmol/L 141   POTASSIUM  "mmol/L 3.9   CO2 mmol/L 30.0*   CHLORIDE mmol/L 103   ANION GAP mmol/L 8.0   CREATININE mg/dL 1.91*   BUN mg/dL 31*   BUN / CREAT RATIO  16.2   CALCIUM mg/dL 9.3   ALK PHOS U/L 97   TOTAL PROTEIN g/dL 7.1   ALT (SGPT) U/L 21   AST (SGOT) U/L 21   BILIRUBIN mg/dL 0.5   ALBUMIN g/dL 4.2   GLOBULIN gm/dL 2.9       No results for input(s): \"MSPIKE\", \"KAPPALAMB\", \"IGLFLC\", \"URICACID\", \"FREEKAPPAL\", \"CEA\", \"LDH\", \"REFLABREPO\" in the last 45247 hours.    Lab Results - Last 18 Months   Lab Units 05/16/24  1114   IRON mcg/dL 92   TIBC mcg/dL 413   IRON SATURATION (TSAT) % 22   FERRITIN ng/mL 67.91         PAST MEDICAL HISTORY:  ALLERGIES:  Allergies   Allergen Reactions    Latex Itching     CURRENT MEDICATIONS:  Outpatient Encounter Medications as of 5/29/2025   Medication Sig Dispense Refill    acetaminophen (TYLENOL) 650 MG 8 hr tablet Take 1 tablet by mouth Every 8 (Eight) Hours As Needed for Mild Pain.      amitriptyline (ELAVIL) 25 MG tablet Take 2 tablets by mouth Every Night.      amLODIPine (NORVASC) 10 MG tablet Take 1 tablet by mouth Daily.      aspirin 81 MG EC tablet Take 1 tablet by mouth Daily.      atorvastatin (LIPITOR) 40 MG tablet Take 1 tablet by mouth Daily.      carvedilol (COREG) 25 MG tablet Take 1 tablet by mouth 2 (Two) Times a Day With Meals.      colestipol (COLESTID) 1 g tablet Take 1 tablet by mouth 2 (Two) Times a Day.      donepezil (ARICEPT) 10 MG tablet Take 1 tablet by mouth Every Night.      FLUoxetine (PROzac) 20 MG capsule Take 2 capsules by mouth Daily.      furosemide (LASIX) 20 MG tablet Take 1 tablet by mouth Daily. 1 or 2 tablets daily      Galcanezumab-gnlm (EMGALITY SC) Inject  under the skin into the appropriate area as directed.      linagliptin (TRADJENTA) 5 MG tablet tablet Take 1 tablet by mouth Daily.      lisinopril (PRINIVIL,ZESTRIL) 20 MG tablet Take 1 tablet by mouth Daily.      meclizine (ANTIVERT) 25 MG tablet Take 1 tablet by mouth 3 (Three) Times a Day As Needed for " Dizziness.      Multiple Vitamins-Minerals (MULTIVITAMIN ADULT PO) Take  by mouth.      pantoprazole (PROTONIX) 40 MG EC tablet Take 1 tablet by mouth Daily. 30 tablet 5    pregabalin (LYRICA) 25 MG capsule       terbinafine (lamiSIL) 250 MG tablet Take 1 tablet by mouth Daily.      VITAMIN D, ERGOCALCIFEROL, PO Take 2,000 mg by mouth Daily.      Wheat Dextrin (BENEFIBER PO) Take  by mouth As Needed.      Xarelto 20 MG tablet Take 1 tablet by mouth Daily With Dinner. 30 tablet 1     No facility-administered encounter medications on file as of 5/29/2025.     ADULT ILLNESSES:  Patient Active Problem List   Diagnosis Code    Body mass index (BMI) of 50-59.9 in adult Z68.43    Stage 3 chronic kidney disease N18.30    Pancytopenia D61.818    Hypersplenism D73.1    Hypertension I10    Non-alcoholic fatty liver disease K76.0    Anemia in stage 3 chronic kidney disease N18.30, D63.1     SURGERIES:  Past Surgical History:   Procedure Laterality Date    CARPAL TUNNEL RELEASE Right     ENDOSCOPY AND COLONOSCOPY  03/2016    FOREIGN BODY REMOVAL Right     leg    HYSTERECTOMY      open; total    LAPAROSCOPIC CHOLECYSTECTOMY      SMALL INTESTINE SURGERY  06/2016    UMBILICAL HERNIA REPAIR      not sure if has mesh in there or not (x4 surgeries)      HEALTH MAINTENANCE ITEMS:  Health Maintenance Due   Topic Date Due    DXA SCAN  Never done    DIABETIC FOOT EXAM  Never done    DIABETIC EYE EXAM  Never done    URINE MICROALBUMIN-CREATININE RATIO (uACR)  Never done    Pneumococcal Vaccine 50+ (1 of 2 - PCV) Never done    COLORECTAL CANCER SCREENING  Never done    ZOSTER VACCINE (1 of 2) Never done    TDAP/TD VACCINES (2 - Tdap) 11/07/2007    HEPATITIS C SCREENING  Never done    ANNUAL WELLNESS VISIT  Never done    MAMMOGRAM  10/26/2018    HEMOGLOBIN A1C  12/12/2019    COVID-19 Vaccine (1 - 2024-25 season) Never done       <no information>  Last Completed Colonoscopy    This patient has no relevant Health Maintenance data.    "      There is no immunization history on file for this patient.  Last Completed Mammogram    This patient has no relevant Health Maintenance data.           FAMILY HISTORY:  Family History   Problem Relation Age of Onset    Cancer Mother     Hypertension Mother     Heart disease Mother     Breast cancer Mother     Dementia Mother     Parkinsonism Mother     No Known Problems Father     Hypertension Brother     Other Maternal Grandmother         brain tumor    No Known Problems Maternal Grandfather     No Known Problems Paternal Grandmother     No Known Problems Paternal Grandfather     No Known Problems Brother      SOCIAL HISTORY:  Social History     Socioeconomic History    Marital status:    Tobacco Use    Smoking status: Never    Smokeless tobacco: Never   Substance and Sexual Activity    Alcohol use: No    Drug use: No    Sexual activity: Defer     Birth control/protection: Surgical       REVIEW OF SYSTEMS:  Review of Systems   Constitutional:  Negative for fatigue and fever.   HENT:  Negative for trouble swallowing.    Respiratory:  Negative for cough and shortness of breath.    Cardiovascular:  Negative for chest pain, palpitations and leg swelling.   Gastrointestinal:  Negative for nausea and vomiting.   Genitourinary:  Negative for hematuria.   Musculoskeletal:  Negative for arthralgias and myalgias.   Skin:  Negative for rash, skin lesions and wound.   Neurological:  Negative for dizziness, syncope, memory problem and confusion.   Psychiatric/Behavioral:  Negative for suicidal ideas and depressed mood. The patient is not nervous/anxious.        /82   Pulse 68   Temp 97.4 °F (36.3 °C)   Resp 16   Ht 160 cm (63\")   Wt 125 kg (276 lb)   SpO2 98%   BMI 48.89 kg/m²  Body surface area is 2.22 meters squared.    Pain Score    05/29/25 1042   PainSc: 0-No pain         Physical Exam  Constitutional:       Appearance: Normal appearance. She is obese.      Comments:     HENT:      Head: " Normocephalic and atraumatic.   Cardiovascular:      Rate and Rhythm: Normal rate and regular rhythm.      Heart sounds: Murmur heard.   Pulmonary:      Effort: Pulmonary effort is normal.      Breath sounds: Normal breath sounds.   Abdominal:      General: Bowel sounds are normal.      Palpations: Abdomen is soft.      Hernia: A hernia is present.   Musculoskeletal:      Right lower leg: No edema.      Left lower leg: No edema.   Skin:     General: Skin is warm and dry.   Neurological:      Mental Status: She is alert and oriented to person, place, and time.   Psychiatric:         Attention and Perception: Attention normal.         Mood and Affect: Mood normal.         Judgment: Judgment normal.       Mariia Parker reports a pain score of 0.  Given her pain assessment as noted, treatment options were discussed and the following options were decided upon as a follow-up plan to address the patient's pain: continuation of current treatment plan for pain.     ASSESSMENT / PLAN    1. Stage 3a chronic kidney disease    2. Iron deficiency anemia, unspecified iron deficiency anemia type          ASSESSMENT:      1.  Iron Deficiency   2.  Anemia in CKD Stage IIIa  -Pt has multifactorial Anemia with intermittent iron deficiency and chronic kidney disease Stage     -Followed by Dr. Sánchez, Nephrology   -Taking oral iron once daily.    -Labs today:  BUN 31, Creatinine 1.91, GFR 28.3, Iron 92, Ferritin 67, TIBC 413, Hgb 11.1, Hct 35.4  -Can start Retacrit if Hgb < 10 or Hct < 30  -Stable for observation       Obesity   Patient's (Body mass index is 48.89 kg/m².) indicates that they are morbidly obese (BMI > 40 or > 35 with obesity - related health condition) with health related conditions that include hypertension, diabetes mellitus, and GERD . Weight is improving with lifestyle modifications.  - Defer management to PCP      She has been referred to Urology for kidney stones.  Requiring cardiac clearance.      PLAN:       Stable for observation  Continue current medications, treatment plans and follow up with PCP and any other providers  Labs only in 3 months.  Orders given to have then drawn at [facility].  Return to office in 6 months for continued follow up.  Pre-office labs 1 week before  for CBC, CMP, Iron Profile, Ferritin Orders given to patient   Care discussed with patient.  Understanding expressed.  Patient agreeable with plan.            Sadie Campbell, APRN  05/29/2025

## 2025-06-19 ENCOUNTER — HOSPITAL ENCOUNTER (OUTPATIENT)
Dept: PAIN MANAGEMENT | Age: 70
Discharge: HOME | End: 2025-06-19
Payer: MEDICARE

## 2025-06-19 VITALS
HEART RATE: 78 BPM | RESPIRATION RATE: 16 BRPM | TEMPERATURE: 97 F | OXYGEN SATURATION: 96 % | SYSTOLIC BLOOD PRESSURE: 146 MMHG | DIASTOLIC BLOOD PRESSURE: 59 MMHG

## 2025-06-19 PROCEDURE — 64615 CHEMODENERV MUSC MIGRAINE: CPT

## 2025-06-19 PROCEDURE — 6360000002 HC RX W HCPCS

## 2025-06-19 PROCEDURE — 64615 CHEMODENERV MUSC MIGRAINE: CPT | Performed by: NURSE PRACTITIONER

## 2025-06-19 PROCEDURE — 2500000003 HC RX 250 WO HCPCS

## 2025-06-19 RX ORDER — SODIUM CHLORIDE 9 MG/ML
4.5 INJECTION, SOLUTION INTRAMUSCULAR; INTRAVENOUS; SUBCUTANEOUS ONCE
Status: DISCONTINUED | OUTPATIENT
Start: 2025-06-19 | End: 2025-06-21 | Stop reason: HOSPADM

## 2025-06-19 NOTE — PROGRESS NOTES
[facility] Neurology Botox Procedure Note     Patient:   Sayra Dye  MR#:    [id number]  Account Number:                   [id number]      YOB: 1955  Date of Evaluation:  6/19/2025  Time of Note:                          2:03 PM  Primary Physician:    Heather Martinez APRN - VLAD   Consulting Physician:  ZULEYKA Natarajan DNP    Consent was signed and on the chart. Risk, benefits, and side effects discussed. Pt has a clear history of having more than 15 days/month of migraine, lasting more than 4 hours with multiple treatment failures.     Vial Exp Date: 10/27 x2  Vial Lot Number:  [id number] x2    Botox was diluted with 0.9% NS to yield a final concentration of 50 units / 1 ml.    The following muscles were injected in 0.1 ml (5 unit) increments:    -   5 units left, 5 units right  Procerus-      5 units  Frontalis-      20 units divided into 4 sites left and right  Temporalis-  40 units divided into 4 sites left and 4 sites right  Occipitalis-    30 units divided into 3 sites left and 3 sites right  Cervical Paraspinal-  20 units divided into 2 sites left and 2 sites right  Trapezius-     30 units divided into 3 sites left and 3 sites right    Total units injected: 155  Total units unavoidably discarded: 45    Pt tolerated the procedure well.  There were no complications. Pt will follow up in 6 weeks to assess effectiveness and will repeat injections in 12 weeks if continues to benefit.      ZULEYKA Natarajan DNP

## 2025-06-19 NOTE — DISCHARGE INSTRUCTIONS
will be numb for a few hours after the procedure    [] I understand if a steroid was used it will take effect in 3 - 7 days. I understand that as the numbing medication wears off, the pain may return until the steroids start working.    [] I understand that today I will rest for the next 24 hours and drink plenty of water.    [x] For Botox for Migraines please do not wear anything constricting around the forehead for 7-10 days post injection. Examples headband, hats, or bandana    [] For Botox for Spasticity start therapy for the affected limb in two weeks.    [] Additional instructions: ______________________________________________ ___________________________________________________________________    Sedation:  Was oral sedation given?    [] Yes  [x] No    I understand that if I took an oral nerve calming medication I will not drive for  [] 24 hours after taking the medication.    [x] I have received a copy of my discharge instructions and understand the above instructions to the best of my knowledge    Patient Discharged:  [x] Home  [] Hospital  [] Other  ____________________________________________    Via:  [x] Ambulatory  [] Wheelchair   [] Stretcher [] EMS       Accompanied By:   [] Significant other  [x] Family Member  [] Friend   [] Hospital Staff  []  Ambulance Staff  [] Other_______________________________________________    Plan:  [] Will return to the office in   [] 1 month   [] 3 months for:  [] Procedure Follow-up  [x] Office Visit   [x] Planned Procedure        [x] Printed AVS and given to patient.  [] Patient has mychart and does not wish for AVS to be printed.      If you have questions, problems or concerns you may call [phone] and follow the prompts

## 2025-06-19 NOTE — PROGRESS NOTES
[facility]   [address]  [place] [address]  [phone] p  [phone]    Procedure:  Level of Consciousness: [x]Alert [x]Oriented []Disoriented []Lethargic  Anxiety Level: [x]Calm []Anxious []Depressed []Other  Skin: []Warm [x]Dry []Cool []Moist []Intact []Other  Cardiovascular: []Palpitations: [x]Never []Occasionally []Frequently  Chest Pain: [x]No []Yes  Respiratory:  [x]Unlabored []Labored []Cough ([] Productive []Unproductive)  HCG Required: [x]No []Yes   Results: []Negative []Positive  Knowledge Level:        [x]Patient/Other verbalized understanding of pre-procedure instructions.        [x]Assessment of post-op care needs (transportation, responsible caregiver)        [x]Able to discuss health care problems and how to deal with it.  Factors that Affect Teaching:        Language Barrier: [x]No []Yes - why:        Hearing Loss:        [x]No []Yes            Corrective Device:  []Yes []No        Vision Loss:           [x]No []Yes            Corrective Device:  []Yes []No        Memory Loss:       [x]No []Yes            []Short Term []Long Term  Motivational Level:  []Asks Questions                  []Extremely Anxious       []Seems Interested               []Seems Uninterested                  [x]Denies need for Education  Risk for Injury:  [x]Patient oriented to person, place and time  []History of frequent falls/loss of balance  Nutritional:  []Change in appetite   []Weight Gain   []Weight Loss  Functional:  []Requires assistance with ADL's      Migraine  Follow up Assessment:  Patient experiences 43 headaches per month  Patient states that he/she has  30 headaches out of 30 days each month.  Patient states that he/she has 13 migraines out of 30 days each month.  Patient has experienced a  reduction in Migraine headaches less than 15 days per month [x]Yes []No  Patient has experienced a reduction in Migraine hours [x]Yes []No

## 2025-07-01 RX ORDER — CARVEDILOL 25 MG/1
TABLET ORAL
Qty: 60 TABLET | Refills: 11 | Status: SHIPPED | OUTPATIENT
Start: 2025-07-01

## 2025-07-01 RX ORDER — MECLIZINE HYDROCHLORIDE 25 MG/1
TABLET ORAL
Qty: 90 TABLET | Refills: 11 | Status: SHIPPED | OUTPATIENT
Start: 2025-07-01

## 2025-07-01 RX ORDER — DONEPEZIL HYDROCHLORIDE 5 MG/1
TABLET, FILM COATED ORAL
Qty: 30 TABLET | Refills: 11 | Status: SHIPPED | OUTPATIENT
Start: 2025-07-01

## 2025-07-01 RX ORDER — AMITRIPTYLINE HYDROCHLORIDE 75 MG/1
75 TABLET ORAL DAILY
Qty: 30 TABLET | Refills: 1 | Status: SHIPPED | OUTPATIENT
Start: 2025-07-01

## 2025-07-01 RX ORDER — FUROSEMIDE 20 MG/1
20 TABLET ORAL EVERY MORNING
Qty: 30 TABLET | Refills: 9 | Status: SHIPPED | OUTPATIENT
Start: 2025-07-01

## 2025-08-04 ENCOUNTER — OFFICE VISIT (OUTPATIENT)
Age: 70
End: 2025-08-04
Payer: MEDICARE

## 2025-08-04 VITALS
BODY MASS INDEX: 48.2 KG/M2 | TEMPERATURE: 97.6 F | HEART RATE: 59 BPM | WEIGHT: 272 LBS | SYSTOLIC BLOOD PRESSURE: 102 MMHG | HEIGHT: 63 IN | RESPIRATION RATE: 16 BRPM | DIASTOLIC BLOOD PRESSURE: 64 MMHG | OXYGEN SATURATION: 100 %

## 2025-08-04 DIAGNOSIS — N18.31 ANEMIA IN STAGE 3A CHRONIC KIDNEY DISEASE: ICD-10-CM

## 2025-08-04 DIAGNOSIS — D61.818 PANCYTOPENIA: ICD-10-CM

## 2025-08-04 DIAGNOSIS — D63.1 ANEMIA IN STAGE 3A CHRONIC KIDNEY DISEASE: ICD-10-CM

## 2025-08-04 DIAGNOSIS — G62.9 PERIPHERAL POLYNEUROPATHY: ICD-10-CM

## 2025-08-04 DIAGNOSIS — I10 HYPERTENSION, UNSPECIFIED TYPE: Primary | ICD-10-CM

## 2025-08-04 DIAGNOSIS — D73.1 HYPERSPLENISM: ICD-10-CM

## 2025-08-04 DIAGNOSIS — R73.9 HYPERGLYCEMIA: ICD-10-CM

## 2025-08-04 DIAGNOSIS — K76.0 NON-ALCOHOLIC FATTY LIVER DISEASE: ICD-10-CM

## 2025-08-04 DIAGNOSIS — N18.31 STAGE 3A CHRONIC KIDNEY DISEASE: ICD-10-CM

## 2025-08-04 PROCEDURE — 3078F DIAST BP <80 MM HG: CPT | Performed by: NURSE PRACTITIONER

## 2025-08-04 PROCEDURE — 1160F RVW MEDS BY RX/DR IN RCRD: CPT | Performed by: NURSE PRACTITIONER

## 2025-08-04 PROCEDURE — 99204 OFFICE O/P NEW MOD 45 MIN: CPT | Performed by: NURSE PRACTITIONER

## 2025-08-04 PROCEDURE — 1159F MED LIST DOCD IN RCRD: CPT | Performed by: NURSE PRACTITIONER

## 2025-08-04 PROCEDURE — 3074F SYST BP LT 130 MM HG: CPT | Performed by: NURSE PRACTITIONER

## 2025-08-04 PROCEDURE — 1126F AMNT PAIN NOTED NONE PRSNT: CPT | Performed by: NURSE PRACTITIONER

## 2025-08-04 PROCEDURE — G2211 COMPLEX E/M VISIT ADD ON: HCPCS | Performed by: NURSE PRACTITIONER

## 2025-08-04 RX ORDER — PREGABALIN 25 MG/1
25 CAPSULE ORAL 3 TIMES DAILY
Qty: 90 CAPSULE | Refills: 2 | Status: SHIPPED | OUTPATIENT
Start: 2025-08-04

## 2025-08-06 ENCOUNTER — LAB (OUTPATIENT)
Age: 70
End: 2025-08-06
Payer: MEDICARE

## 2025-08-07 LAB
ALBUMIN SERPL-MCNC: 4.5 G/DL (ref 3.9–4.9)
ALBUMIN/CREAT UR: 11 MG/G CREAT (ref 0–29)
ALP SERPL-CCNC: 97 IU/L (ref 44–121)
ALT SERPL-CCNC: 21 IU/L (ref 0–32)
AST SERPL-CCNC: 25 IU/L (ref 0–40)
BASOPHILS # BLD AUTO: 0 X10E3/UL (ref 0–0.2)
BASOPHILS NFR BLD AUTO: 1 %
BILIRUB SERPL-MCNC: 0.8 MG/DL (ref 0–1.2)
BUN SERPL-MCNC: 24 MG/DL (ref 8–27)
BUN/CREAT SERPL: 18 (ref 12–28)
CALCIUM SERPL-MCNC: 9.6 MG/DL (ref 8.7–10.3)
CHLORIDE SERPL-SCNC: 106 MMOL/L (ref 96–106)
CHOLEST SERPL-MCNC: 141 MG/DL (ref 100–199)
CO2 SERPL-SCNC: 20 MMOL/L (ref 20–29)
CREAT SERPL-MCNC: 1.37 MG/DL (ref 0.57–1)
CREAT UR-MCNC: 100.8 MG/DL
EGFRCR SERPLBLD CKD-EPI 2021: 42 ML/MIN/1.73
EOSINOPHIL # BLD AUTO: 0.1 X10E3/UL (ref 0–0.4)
EOSINOPHIL NFR BLD AUTO: 3 %
ERYTHROCYTE [DISTWIDTH] IN BLOOD BY AUTOMATED COUNT: 12 % (ref 11.7–15.4)
GLOBULIN SER CALC-MCNC: 2.5 G/DL (ref 1.5–4.5)
GLUCOSE SERPL-MCNC: 148 MG/DL (ref 70–99)
HBA1C MFR BLD: 6.3 % (ref 4.8–5.6)
HCT VFR BLD AUTO: 37.8 % (ref 34–46.6)
HCV IGG SERPL QL IA: NON REACTIVE
HDLC SERPL-MCNC: 55 MG/DL
HGB BLD-MCNC: 11.6 G/DL (ref 11.1–15.9)
IMM GRANULOCYTES # BLD AUTO: 0 X10E3/UL (ref 0–0.1)
IMM GRANULOCYTES NFR BLD AUTO: 0 %
LDLC SERPL CALC-MCNC: 52 MG/DL (ref 0–99)
LYMPHOCYTES # BLD AUTO: 1 X10E3/UL (ref 0.7–3.1)
LYMPHOCYTES NFR BLD AUTO: 23 %
MCH RBC QN AUTO: 31.5 PG (ref 26.6–33)
MCHC RBC AUTO-ENTMCNC: 30.7 G/DL (ref 31.5–35.7)
MCV RBC AUTO: 103 FL (ref 79–97)
MICROALBUMIN UR-MCNC: 10.9 UG/ML
MONOCYTES # BLD AUTO: 0.4 X10E3/UL (ref 0.1–0.9)
MONOCYTES NFR BLD AUTO: 9 %
NEUTROPHILS # BLD AUTO: 2.8 X10E3/UL (ref 1.4–7)
NEUTROPHILS NFR BLD AUTO: 64 %
PLATELET # BLD AUTO: 186 X10E3/UL (ref 150–450)
POTASSIUM SERPL-SCNC: 4.4 MMOL/L (ref 3.5–5.2)
PROT SERPL-MCNC: 7 G/DL (ref 6–8.5)
RBC # BLD AUTO: 3.68 X10E6/UL (ref 3.77–5.28)
SODIUM SERPL-SCNC: 141 MMOL/L (ref 134–144)
TRIGL SERPL-MCNC: 213 MG/DL (ref 0–149)
TSH SERPL DL<=0.005 MIU/L-ACNC: 4.23 UIU/ML (ref 0.45–4.5)
VLDLC SERPL CALC-MCNC: 34 MG/DL (ref 5–40)
WBC # BLD AUTO: 4.3 X10E3/UL (ref 3.4–10.8)